# Patient Record
Sex: MALE | Race: WHITE | Employment: OTHER | URBAN - METROPOLITAN AREA
[De-identification: names, ages, dates, MRNs, and addresses within clinical notes are randomized per-mention and may not be internally consistent; named-entity substitution may affect disease eponyms.]

---

## 2022-01-28 ENCOUNTER — HOSPITAL ENCOUNTER (OUTPATIENT)
Age: 57
Setting detail: SPECIMEN
Discharge: HOME | End: 2022-01-28

## 2022-01-28 ENCOUNTER — OFFICE VISIT (OUTPATIENT)
Dept: INTERNAL MEDICINE CLINIC | Age: 57
End: 2022-01-28
Payer: COMMERCIAL

## 2022-01-28 VITALS
HEIGHT: 67 IN | HEART RATE: 94 BPM | WEIGHT: 146.6 LBS | OXYGEN SATURATION: 99 % | SYSTOLIC BLOOD PRESSURE: 120 MMHG | DIASTOLIC BLOOD PRESSURE: 80 MMHG | BODY MASS INDEX: 23.01 KG/M2

## 2022-01-28 DIAGNOSIS — F10.20 ALCOHOLISM (HCC): ICD-10-CM

## 2022-01-28 DIAGNOSIS — Z13.220 SCREENING FOR HYPERLIPIDEMIA: ICD-10-CM

## 2022-01-28 DIAGNOSIS — F33.41 RECURRENT MAJOR DEPRESSIVE DISORDER, IN PARTIAL REMISSION (HCC): ICD-10-CM

## 2022-01-28 DIAGNOSIS — F18.10 ABUSE OF SMOKED SUBSTANCE (HCC): ICD-10-CM

## 2022-01-28 DIAGNOSIS — J96.11 CHRONIC RESPIRATORY FAILURE WITH HYPOXIA (HCC): ICD-10-CM

## 2022-01-28 DIAGNOSIS — I10 BENIGN ESSENTIAL HTN: ICD-10-CM

## 2022-01-28 DIAGNOSIS — J96.11 CHRONIC RESPIRATORY FAILURE WITH HYPOXIA (HCC): Primary | ICD-10-CM

## 2022-01-28 DIAGNOSIS — Z12.11 SCREENING FOR COLON CANCER: ICD-10-CM

## 2022-01-28 DIAGNOSIS — J44.9 CHRONIC OBSTRUCTIVE PULMONARY DISEASE, UNSPECIFIED COPD TYPE (HCC): ICD-10-CM

## 2022-01-28 LAB
-: NORMAL
ABSOLUTE EOS #: 0.07 K/UL (ref 0–0.44)
ABSOLUTE IMMATURE GRANULOCYTE: 0.07 K/UL (ref 0–0.3)
ABSOLUTE LYMPH #: 1.64 K/UL (ref 1.1–3.7)
ABSOLUTE MONO #: 0.58 K/UL (ref 0.1–1.2)
ALBUMIN SERPL-MCNC: 4.3 G/DL (ref 3.5–5.2)
ALBUMIN/GLOBULIN RATIO: 1.6 (ref 1–2.5)
ALP BLD-CCNC: 87 U/L (ref 40–129)
ALT SERPL-CCNC: 11 U/L (ref 5–41)
AMORPHOUS: NORMAL
ANION GAP SERPL CALCULATED.3IONS-SCNC: 12 MMOL/L (ref 9–17)
AST SERPL-CCNC: 21 U/L
BACTERIA: NORMAL
BASOPHILS # BLD: 1 % (ref 0–2)
BASOPHILS ABSOLUTE: 0.05 K/UL (ref 0–0.2)
BILIRUB SERPL-MCNC: 0.25 MG/DL (ref 0.3–1.2)
BILIRUBIN URINE: ABNORMAL
BUN BLDV-MCNC: 11 MG/DL (ref 6–20)
BUN/CREAT BLD: ABNORMAL (ref 9–20)
CALCIUM SERPL-MCNC: 9.5 MG/DL (ref 8.6–10.4)
CASTS UA: NORMAL /LPF (ref 0–8)
CHLORIDE BLD-SCNC: 92 MMOL/L (ref 98–107)
CHOLESTEROL/HDL RATIO: 3.1
CHOLESTEROL: 183 MG/DL
CO2: 29 MMOL/L (ref 20–31)
COLOR: ABNORMAL
COMMENT UA: ABNORMAL
CREAT SERPL-MCNC: 0.75 MG/DL (ref 0.7–1.2)
CRYSTALS, UA: NORMAL /HPF
DIFFERENTIAL TYPE: ABNORMAL
EOSINOPHILS RELATIVE PERCENT: 1 % (ref 1–4)
EPITHELIAL CELLS UA: NORMAL /HPF (ref 0–5)
GFR AFRICAN AMERICAN: >60 ML/MIN
GFR NON-AFRICAN AMERICAN: >60 ML/MIN
GFR SERPL CREATININE-BSD FRML MDRD: ABNORMAL ML/MIN/{1.73_M2}
GFR SERPL CREATININE-BSD FRML MDRD: ABNORMAL ML/MIN/{1.73_M2}
GLUCOSE BLD-MCNC: 93 MG/DL (ref 70–99)
GLUCOSE URINE: NEGATIVE
HCT VFR BLD CALC: 44.2 % (ref 40.7–50.3)
HDLC SERPL-MCNC: 59 MG/DL
HEMOGLOBIN: 13.3 G/DL (ref 13–17)
IMMATURE GRANULOCYTES: 1 %
KETONES, URINE: ABNORMAL
LDL CHOLESTEROL: 107 MG/DL (ref 0–130)
LEUKOCYTE ESTERASE, URINE: ABNORMAL
LYMPHOCYTES # BLD: 23 % (ref 24–43)
MCH RBC QN AUTO: 29.8 PG (ref 25.2–33.5)
MCHC RBC AUTO-ENTMCNC: 30.1 G/DL (ref 28.4–34.8)
MCV RBC AUTO: 99.1 FL (ref 82.6–102.9)
MONOCYTES # BLD: 8 % (ref 3–12)
MUCUS: NORMAL
NITRITE, URINE: NEGATIVE
NRBC AUTOMATED: 0 PER 100 WBC
OTHER OBSERVATIONS UA: NORMAL
PDW BLD-RTO: 11.7 % (ref 11.8–14.4)
PH UA: 6 (ref 5–8)
PLATELET # BLD: 330 K/UL (ref 138–453)
PLATELET ESTIMATE: ABNORMAL
PMV BLD AUTO: 9.8 FL (ref 8.1–13.5)
POTASSIUM SERPL-SCNC: 5.7 MMOL/L (ref 3.7–5.3)
PROTEIN UA: ABNORMAL
RBC # BLD: 4.46 M/UL (ref 4.21–5.77)
RBC # BLD: ABNORMAL 10*6/UL
RBC UA: NORMAL /HPF (ref 0–4)
RENAL EPITHELIAL, UA: NORMAL /HPF
SEG NEUTROPHILS: 66 % (ref 36–65)
SEGMENTED NEUTROPHILS ABSOLUTE COUNT: 4.66 K/UL (ref 1.5–8.1)
SODIUM BLD-SCNC: 133 MMOL/L (ref 135–144)
SPECIFIC GRAVITY UA: 1.03 (ref 1–1.03)
TOTAL PROTEIN: 7 G/DL (ref 6.4–8.3)
TRICHOMONAS: NORMAL
TRIGL SERPL-MCNC: 86 MG/DL
TSH SERPL DL<=0.05 MIU/L-ACNC: 0.55 MIU/L (ref 0.3–5)
TURBIDITY: ABNORMAL
URINE HGB: NEGATIVE
UROBILINOGEN, URINE: NORMAL
VITAMIN D 25-HYDROXY: 13.4 NG/ML (ref 30–100)
VLDLC SERPL CALC-MCNC: NORMAL MG/DL (ref 1–30)
WBC # BLD: 7.1 K/UL (ref 3.5–11.3)
WBC # BLD: ABNORMAL 10*3/UL
WBC UA: NORMAL /HPF (ref 0–5)
YEAST: NORMAL

## 2022-01-28 PROCEDURE — 99204 OFFICE O/P NEW MOD 45 MIN: CPT | Performed by: INTERNAL MEDICINE

## 2022-01-28 PROCEDURE — 3023F SPIROM DOC REV: CPT | Performed by: INTERNAL MEDICINE

## 2022-01-28 PROCEDURE — G8427 DOCREV CUR MEDS BY ELIG CLIN: HCPCS | Performed by: INTERNAL MEDICINE

## 2022-01-28 PROCEDURE — 3017F COLORECTAL CA SCREEN DOC REV: CPT | Performed by: INTERNAL MEDICINE

## 2022-01-28 PROCEDURE — 4004F PT TOBACCO SCREEN RCVD TLK: CPT | Performed by: INTERNAL MEDICINE

## 2022-01-28 PROCEDURE — G8484 FLU IMMUNIZE NO ADMIN: HCPCS | Performed by: INTERNAL MEDICINE

## 2022-01-28 PROCEDURE — G8420 CALC BMI NORM PARAMETERS: HCPCS | Performed by: INTERNAL MEDICINE

## 2022-01-28 RX ORDER — LANOLIN ALCOHOL/MO/W.PET/CERES
CREAM (GRAM) TOPICAL
COMMUNITY
Start: 2021-12-29 | End: 2022-03-24 | Stop reason: SDUPTHER

## 2022-01-28 RX ORDER — ALBUTEROL SULFATE 2.5 MG/3ML
SOLUTION RESPIRATORY (INHALATION)
COMMUNITY
Start: 2021-11-19 | End: 2022-06-07 | Stop reason: SDUPTHER

## 2022-01-28 RX ORDER — ESCITALOPRAM OXALATE 10 MG/1
TABLET ORAL
COMMUNITY
Start: 2021-12-24 | End: 2022-03-24 | Stop reason: SDUPTHER

## 2022-01-28 RX ORDER — BUDESONIDE AND FORMOTEROL FUMARATE DIHYDRATE 160; 4.5 UG/1; UG/1
AEROSOL RESPIRATORY (INHALATION)
COMMUNITY
Start: 2022-01-25 | End: 2022-03-24 | Stop reason: SDUPTHER

## 2022-01-28 RX ORDER — FOLIC ACID 1 MG/1
TABLET ORAL
COMMUNITY
Start: 2021-12-02 | End: 2022-03-24 | Stop reason: SDUPTHER

## 2022-01-28 RX ORDER — TIOTROPIUM BROMIDE 18 UG/1
18 CAPSULE ORAL; RESPIRATORY (INHALATION) DAILY
COMMUNITY
End: 2022-03-24 | Stop reason: SDUPTHER

## 2022-01-28 RX ORDER — TRAZODONE HYDROCHLORIDE 50 MG/1
TABLET ORAL
COMMUNITY
Start: 2021-12-02 | End: 2022-03-24 | Stop reason: SDUPTHER

## 2022-01-28 RX ORDER — LISINOPRIL 10 MG/1
TABLET ORAL
COMMUNITY
Start: 2021-11-27 | End: 2022-02-11

## 2022-01-28 SDOH — ECONOMIC STABILITY: FOOD INSECURITY: WITHIN THE PAST 12 MONTHS, THE FOOD YOU BOUGHT JUST DIDN'T LAST AND YOU DIDN'T HAVE MONEY TO GET MORE.: NEVER TRUE

## 2022-01-28 SDOH — ECONOMIC STABILITY: FOOD INSECURITY: WITHIN THE PAST 12 MONTHS, YOU WORRIED THAT YOUR FOOD WOULD RUN OUT BEFORE YOU GOT MONEY TO BUY MORE.: NEVER TRUE

## 2022-01-28 ASSESSMENT — PATIENT HEALTH QUESTIONNAIRE - PHQ9
SUM OF ALL RESPONSES TO PHQ QUESTIONS 1-9: 2
SUM OF ALL RESPONSES TO PHQ QUESTIONS 1-9: 2
1. LITTLE INTEREST OR PLEASURE IN DOING THINGS: 1
SUM OF ALL RESPONSES TO PHQ9 QUESTIONS 1 & 2: 2
SUM OF ALL RESPONSES TO PHQ QUESTIONS 1-9: 2
SUM OF ALL RESPONSES TO PHQ QUESTIONS 1-9: 2
2. FEELING DOWN, DEPRESSED OR HOPELESS: 1

## 2022-01-28 ASSESSMENT — SOCIAL DETERMINANTS OF HEALTH (SDOH): HOW HARD IS IT FOR YOU TO PAY FOR THE VERY BASICS LIKE FOOD, HOUSING, MEDICAL CARE, AND HEATING?: NOT HARD AT ALL

## 2022-01-28 NOTE — PROGRESS NOTES
Visit Information    Have you changed or started any medications since your last visit including any over-the-counter medicines, vitamins, or herbal medicines? no   Are you having any side effects from any of your medications? -  no  Have you stopped taking any of your medications? Is so, why? -  no    Have you seen any other physician or provider since your last visit? No  Have you had any other diagnostic tests since your last visit? No  Have you been seen in the emergency room and/or had an admission to a hospital since we last saw you? No  Have you had your routine dental cleaning in the past 6 months? no    Have you activated your [employer] account? If not, what are your barriers?  No:      Patient Care Team:  Aliyah Ragsdale MD as PCP - General (Internal Medicine)    Medical History Review  Past Medical, Family, and Social History reviewed and does contribute to the patient presenting condition    Health Maintenance   Topic Date Due    Hepatitis C screen  Never done    COVID-19 Vaccine (1) Never done    Depression Screen  Never done    HIV screen  Never done    DTaP/Tdap/Td vaccine (1 - Tdap) Never done    Lipid screen  Never done    Colon cancer screen colonoscopy  Never done    Shingles Vaccine (1 of 2) Never done    Flu vaccine (1) Never done    Hepatitis A vaccine  Aged Out    Hepatitis B vaccine  Aged Out    Hib vaccine  Aged Out    Meningococcal (ACWY) vaccine  Aged Out    Pneumococcal 0-64 years Vaccine  Aged Out

## 2022-01-29 DIAGNOSIS — E87.5 HYPERKALEMIA: Primary | ICD-10-CM

## 2022-01-29 RX ORDER — ERGOCALCIFEROL 1.25 MG/1
50000 CAPSULE ORAL WEEKLY
Qty: 12 CAPSULE | Refills: 1 | Status: SHIPPED | OUTPATIENT
Start: 2022-01-29 | End: 2022-06-22 | Stop reason: SDUPTHER

## 2022-01-29 RX ORDER — SODIUM POLYSTYRENE SULFONATE 15 G/60ML
15 SUSPENSION ORAL; RECTAL 2 TIMES DAILY
Qty: 240 ML | Refills: 0 | Status: SHIPPED | OUTPATIENT
Start: 2022-01-29 | End: 2022-06-22

## 2022-01-29 NOTE — ASSESSMENT & PLAN NOTE
HTN:    Discussed in detail about the BP, lab results, importance of diet, salt intake, exercise, and med compliance. Medication side effects discussed in detail and has none today. Patient verbalized understanding.      BP Readings from Last 3 Encounters:   01/28/22 120/80        CREATININE   Date Value Ref Range Status   01/28/2022 0.75 0.70 - 1.20 mg/dL Final

## 2022-01-29 NOTE — ASSESSMENT & PLAN NOTE
Longstanding history of smoking, more than 50-pack-year,  Advanced COPD, on home oxygen, 3 L,  Still smokes pack a day, understand that he can burn his face with oxygen on, he takes oxygen off when he smokes, strongly advised to quit smoking

## 2022-01-29 NOTE — PROGRESS NOTES
[address] [facility] [address]  [place] [address]  Dept: [phone]  Dept Fax: [phone]     Name: Dulce Yousif  : 1965           Chief Complaint   Patient presents with    New Patient    COPD     needs a new pulmonologist just moved to the area, referral for patient preference is pending     Hypertension     mamanged with lisinopril       History of Present Illness:    HPI  Pt Here to est care   Smoking pack a day with hx of >50 pack yr   Quit drinking alcohol few weeks back   Have been alcoholic all his life   On ch oxygen   Copd   Past Medical History:    No past medical history on file. Reviewed all health maintenance requirements and ordered appropriate tests  Health Maintenance Due   Topic Date Due    Hepatitis C screen  Never done    COVID-19 Vaccine (1) Never done    Depression Screen  Never done    HIV screen  Never done    DTaP/Tdap/Td vaccine (1 - Tdap) Never done    Colon cancer screen colonoscopy  Never done    Shingles Vaccine (1 of 2) Never done    Low dose CT lung screening  Never done    Flu vaccine (1) Never done       Past Surgical History:    No past surgical history on file. Medications:      Current Outpatient Medications:     tiotropium (SPIRIVA HANDIHALER) 18 MCG inhalation capsule, Inhale 18 mcg into the lungs daily, Disp: , Rfl:     traZODone (DESYREL) 50 MG tablet, , Disp: , Rfl:     thiamine 100 MG tablet, , Disp: , Rfl:     lisinopril (PRINIVIL;ZESTRIL) 10 MG tablet, , Disp: , Rfl:     folic acid (FOLVITE) 1 MG tablet, , Disp: , Rfl:     escitalopram (LEXAPRO) 10 MG tablet, , Disp: , Rfl:     SYMBICORT 160-4.5 MCG/ACT AERO, , Disp: , Rfl:     albuterol (PROVENTIL) (2.5 MG/3ML) 0.083% nebulizer solution, , Disp: , Rfl:     Allergies:      Patient has no known allergies. Social History:    Tobacco:    reports that he has been smoking. He has a 40.00 pack-year smoking history.  He has never used smokeless tobacco.  Alcohol:      reports previous alcohol use. Drug Use:  reports previous drug use. Family History:    No family history on file.     Review of Systems:    Positive and Negative as described in HPI    Constitutional:  negative for  fevers, chills, sweats, fatigue, and weight loss  HEENT:  negative for vision or hearing changes,   Respiratory: positive for shortness of breath, cough, or congestion  Cardiovascular:  negative for  chest pain, palpitations  Gastrointestinal:  negative for nausea, vomiting, diarrhea, constipation, abdominal pain  Genitourinary:  negative for frequency, dysuria  Integument/Breast:  negative for rash, skin lesions  Musculoskeletal:  negative for muscle aches or joint pain  Neurological:  negative for headaches, dizziness, lightheadedness, numbness, pain and tingling extrimities  Behavior/Psych:  negative for depression and anxiety      Physical Exam:    Vitals:  /80   Pulse 94   Ht 5' 7\" (1.702 m)   Wt 146 lb 9.6 oz (66.5 kg)   SpO2 99%   BMI 22.96 kg/m²     General appearance - alert, well appearing, and in no acute distress  Mental status - oriented to person, place, and time with normal affect  Head - normocephalic and atraumatic  Eyes - pupils equal and reactive, extraocular eye movements intact, conjunctiva clear  Ears - hearing appears to be intact  Nose - no drainage noted  Mouth - mucous membranes moist  Neck - supple, no carotid bruits, thyroid not palpable  Chest - b/l wheezing, prolonged ex phase   Heart - normal rate, regular rhythm, no murmurs  Abdomen - soft, nontender, nondistended, bowel sounds present all four quadrants, no masses, hepatomegaly or splenomegaly  Neurological - normal speech, no focal findings or movement disorder noted, cranial nerves II through XII grossly intact  Extremities - peripheral pulses palpable, no pedal edema or calf pain with palpation  Skin - no gross lesions, rashes, or induration noted      Data:    Lab Results Component Value Date     01/28/2022    K 5.7 01/28/2022    CL 92 01/28/2022    CO2 29 01/28/2022    BUN 11 01/28/2022    CREATININE 0.75 01/28/2022    GLUCOSE 93 01/28/2022    PROT 7.0 01/28/2022    LABALBU 4.3 01/28/2022    BILITOT 0.25 01/28/2022    ALKPHOS 87 01/28/2022    AST 21 01/28/2022    ALT 11 01/28/2022     Lab Results   Component Value Date    WBC 7.1 01/28/2022    RBC 4.46 01/28/2022    HGB 13.3 01/28/2022    HCT 44.2 01/28/2022    MCV 99.1 01/28/2022    MCH 29.8 01/28/2022    MCHC 30.1 01/28/2022    RDW 11.7 01/28/2022     01/28/2022    MPV 9.8 01/28/2022     Lab Results   Component Value Date    TSH 0.55 01/28/2022     Lab Results   Component Value Date    CHOL 183 01/28/2022    HDL 59 01/28/2022          Assessment & Plan:     Diagnosis Orders   1. [facility]  FIT-DNA (Cologuard)    BIANCA Brewster MD, Pulmonology, [place]    CBC Auto Differential    Comprehensive Metabolic Panel    Lipid Panel    Hemoglobin A1C    TSH without Reflex    Vitamin D 25 Hydroxy    Urinalysis   2. Alcoholism ([facility]  FIT-DNA (Cologuard)    Sang Villagran MD, Pulmonology, [place]    CBC Auto Differential    Comprehensive Metabolic Panel    Lipid Panel    Hemoglobin A1C    TSH without Reflex    Vitamin D 25 Hydroxy    Urinalysis   3. Screening for colon cancer  FIT-DNA (Cologuard)   4. Screening for hyperlipidemia     5. Abuse of smoked substance ([facility]     6. Chronic obstructive pulmonary disease, unspecified COPD type ([facility]     7. Benign essential HTN     8. Recurrent major depressive disorder, in partial remission ([facility]         1.  Chronic respiratory failure with hypoxia (HCC)  Assessment & Plan:   Longstanding history of smoking, more than 50-pack-year,  Advanced COPD, on home oxygen, 3 L,  Still smokes pack a day, understand that he can burn his face with oxygen on, he takes oxygen off when he smokes, strongly advised to quit smoking  Orders:  -     FIT-DNA (Cologuard); Future  -     BIANCA Iqbal MD, Pulmonology, [place]  -     CBC Auto Differential; Future  -     Comprehensive Metabolic Panel; Future  -     Lipid Panel; Future  -     Hemoglobin A1C; Future  -     TSH without Reflex; Future  -     Vitamin D 25 Hydroxy; Future  -     Urinalysis; Future  2. Alcoholism [facility])  Assessment & Plan:  Patient has been drinking alcohol all his life, quit drinking few weeks back, not in any withdrawal at this time,  We will get the liver functions checked  Orders:  -     [facility]  -     BIANCA - Guillermina Iqbal MD, Pulmonology, [place]  -     CBC Auto Differential; Future  -     Comprehensive Metabolic Panel; Future  -     Lipid Panel; Future  -     Hemoglobin A1C; Future  -     TSH without Reflex; Future  -     Vitamin D 25 Hydroxy; Future  -     Urinalysis; Future  3. Screening for colon cancer  -     FIT-DNA (Cologuard); Future  4. Screening for hyperlipidemia  5. Abuse of smoked substance ([facility]  Assessment & Plan:   Smoking   Pt still smokes >10 pack yr   Strongly advised to quit,  pt will try /help offered  Counseling done for >10 mins     6. Chronic obstructive pulmonary disease, unspecified COPD type ([facility]  Assessment & Plan:  More than 50-pack-year history of smoking, advanced COPD and chronic respiratory failure, continue inhalers, refer to pulmonary  7. Benign essential HTN  Assessment & Plan:  HTN:    Discussed in detail about the BP, lab results, importance of diet, salt intake, exercise, and med compliance. Medication side effects discussed in detail and has none today. Patient verbalized understanding. BP Readings from Last 3 Encounters:   01/28/22 120/80        CREATININE   Date Value Ref Range Status   01/28/2022 0.75 0.70 - 1.20 mg/dL Final            8.  Recurrent major depressive disorder, in partial remission ([facility]  Assessment & Plan:  Seems like stable with meds   Counseling offered                Completed Refills   Requested Prescriptions      No prescriptions requested or ordered in this encounter     Return in about 4 weeks (around 2/25/2022). No orders of the defined types were placed in this encounter. Orders Placed This Encounter   Procedures    FIT-DNA (Cologuard)     Standing Status:   Future     Standing Expiration Date:   1/26/2023    CBC Auto Differential     Standing Status:   Future     Number of Occurrences:   1     Standing Expiration Date:   1/28/2023    Comprehensive Metabolic Panel     Standing Status:   Future     Number of Occurrences:   1     Standing Expiration Date:   1/28/2023    Lipid Panel     Standing Status:   Future     Number of Occurrences:   1     Standing Expiration Date:   1/28/2023     Order Specific Question:   Is Patient Fasting?/# of Hours     Answer:   8-10 Hours    Hemoglobin A1C     Standing Status:   Future     Number of Occurrences:   1     Standing Expiration Date:   1/28/2023    TSH without Reflex     Standing Status:   Future     Number of Occurrences:   1     Standing Expiration Date:   1/28/2023    Vitamin D 25 Hydroxy     Standing Status:   Future     Number of Occurrences:   1     Standing Expiration Date:   1/28/2023    Urinalysis     Standing Status:   Future     Number of Occurrences:   1     Standing Expiration Date:   1/28/2023     Order Specific Question:   SPECIFY(EX-CATH,MIDSTREAM,CYSTO,ETC)?      Answer:   Portia De La Garza MD, Pulmonology, [place]     Referral Priority:   Routine     Referral Type:   Eval and Treat     Referral Reason:   Specialty Services Required     Referred to Provider:   Jt Schwarz MD     Requested Specialty:   Pulmonology     Number of Visits Requested:   1       Electronically signed by Emile Lugo MD on 1/28/2022 at 10:52 PM

## 2022-01-29 NOTE — ASSESSMENT & PLAN NOTE
Patient has been drinking alcohol all his life, quit drinking few weeks back, not in any withdrawal at this time,  We will get the liver functions checked

## 2022-01-29 NOTE — ASSESSMENT & PLAN NOTE
More than 50-pack-year history of smoking, advanced COPD and chronic respiratory failure, continue inhalers, refer to pulmonary

## 2022-01-29 NOTE — ASSESSMENT & PLAN NOTE
Smoking   Pt still smokes >10 pack yr   Strongly advised to quit,  pt will try /help offered  Counseling done for >10 mins

## 2022-01-30 LAB
ESTIMATED AVERAGE GLUCOSE: 97 MG/DL
HBA1C MFR BLD: 5 % (ref 4–6)

## 2022-02-01 ENCOUNTER — TELEPHONE (OUTPATIENT)
Dept: INTERNAL MEDICINE CLINIC | Age: 57
End: 2022-02-01

## 2022-02-01 NOTE — TELEPHONE ENCOUNTER
Ashly Salcedo called to clarify lab result notes , repeated Dr Mela Parrish message back to her. Question how long do you want patient to wait to repeat recheck on potassium and how long does he hold the lisinopril?

## 2022-02-02 NOTE — TELEPHONE ENCOUNTER
I ordered it for a specific date , dont remember , Will start lisinopril right after K results are back

## 2022-02-10 ENCOUNTER — HOSPITAL ENCOUNTER (OUTPATIENT)
Age: 57
Setting detail: SPECIMEN
Discharge: HOME | End: 2022-02-10

## 2022-02-10 DIAGNOSIS — E87.5 HYPERKALEMIA: ICD-10-CM

## 2022-02-10 LAB
ANION GAP SERPL CALCULATED.3IONS-SCNC: 15 MMOL/L (ref 9–17)
BUN BLDV-MCNC: 9 MG/DL (ref 6–20)
BUN/CREAT BLD: ABNORMAL (ref 9–20)
CALCIUM SERPL-MCNC: 9.5 MG/DL (ref 8.6–10.4)
CHLORIDE BLD-SCNC: 86 MMOL/L (ref 98–107)
CO2: 28 MMOL/L (ref 20–31)
CREAT SERPL-MCNC: 0.6 MG/DL (ref 0.7–1.2)
GFR AFRICAN AMERICAN: >60 ML/MIN
GFR NON-AFRICAN AMERICAN: >60 ML/MIN
GFR SERPL CREATININE-BSD FRML MDRD: ABNORMAL ML/MIN/{1.73_M2}
GFR SERPL CREATININE-BSD FRML MDRD: ABNORMAL ML/MIN/{1.73_M2}
GLUCOSE BLD-MCNC: 85 MG/DL (ref 70–99)
POTASSIUM SERPL-SCNC: 5.3 MMOL/L (ref 3.7–5.3)
SODIUM BLD-SCNC: 129 MMOL/L (ref 135–144)

## 2022-02-11 ENCOUNTER — OFFICE VISIT (OUTPATIENT)
Dept: INTERNAL MEDICINE CLINIC | Age: 57
End: 2022-02-11
Payer: COMMERCIAL

## 2022-02-11 VITALS
WEIGHT: 147.2 LBS | BODY MASS INDEX: 23.1 KG/M2 | DIASTOLIC BLOOD PRESSURE: 100 MMHG | HEIGHT: 67 IN | SYSTOLIC BLOOD PRESSURE: 162 MMHG | HEART RATE: 87 BPM | OXYGEN SATURATION: 99 %

## 2022-02-11 DIAGNOSIS — F18.10 ABUSE OF SMOKED SUBSTANCE (HCC): ICD-10-CM

## 2022-02-11 DIAGNOSIS — I10 BENIGN ESSENTIAL HTN: ICD-10-CM

## 2022-02-11 DIAGNOSIS — E87.1 HYPONATREMIA: ICD-10-CM

## 2022-02-11 DIAGNOSIS — F10.20 ALCOHOLISM (HCC): ICD-10-CM

## 2022-02-11 DIAGNOSIS — F33.41 RECURRENT MAJOR DEPRESSIVE DISORDER, IN PARTIAL REMISSION (HCC): ICD-10-CM

## 2022-02-11 DIAGNOSIS — E87.5 HYPERKALEMIA: ICD-10-CM

## 2022-02-11 DIAGNOSIS — J96.11 CHRONIC RESPIRATORY FAILURE WITH HYPOXIA (HCC): Primary | ICD-10-CM

## 2022-02-11 DIAGNOSIS — E55.9 VITAMIN D DEFICIENCY: ICD-10-CM

## 2022-02-11 PROCEDURE — G8420 CALC BMI NORM PARAMETERS: HCPCS | Performed by: INTERNAL MEDICINE

## 2022-02-11 PROCEDURE — 3017F COLORECTAL CA SCREEN DOC REV: CPT | Performed by: INTERNAL MEDICINE

## 2022-02-11 PROCEDURE — G8484 FLU IMMUNIZE NO ADMIN: HCPCS | Performed by: INTERNAL MEDICINE

## 2022-02-11 PROCEDURE — 99214 OFFICE O/P EST MOD 30 MIN: CPT | Performed by: INTERNAL MEDICINE

## 2022-02-11 PROCEDURE — G8427 DOCREV CUR MEDS BY ELIG CLIN: HCPCS | Performed by: INTERNAL MEDICINE

## 2022-02-11 PROCEDURE — 4004F PT TOBACCO SCREEN RCVD TLK: CPT | Performed by: INTERNAL MEDICINE

## 2022-02-11 RX ORDER — AMLODIPINE BESYLATE 10 MG/1
10 TABLET ORAL DAILY
Qty: 30 TABLET | Refills: 5 | Status: SHIPPED | OUTPATIENT
Start: 2022-02-11 | End: 2022-03-25 | Stop reason: SDUPTHER

## 2022-02-11 RX ORDER — LISINOPRIL AND HYDROCHLOROTHIAZIDE 12.5; 1 MG/1; MG/1
1 TABLET ORAL DAILY
Qty: 30 TABLET | Refills: 11 | Status: SHIPPED | OUTPATIENT
Start: 2022-02-11 | End: 2022-02-11

## 2022-02-11 RX ORDER — LIDOCAINE 4 G/G
1 PATCH TOPICAL DAILY
Qty: 30 PATCH | Refills: 0 | Status: SHIPPED | OUTPATIENT
Start: 2022-02-11 | End: 2022-03-13

## 2022-02-11 NOTE — PROGRESS NOTES
[address] [facility] [address]  [place] [address]  Dept: [phone]  Dept Fax: [phone]     Name: Adrianna Reaves  : 1965           Chief Complaint   Patient presents with    COPD     2 week follow up review labs    Hypertension     2 weeks follow up review labs       History of Present Illness:    HPI  Here to follow up   Still smoke   On oxygen   BP is running high     Past Medical History:    No past medical history on file. Reviewed all health maintenance requirements and ordered appropriate tests  Health Maintenance Due   Topic Date Due    Hepatitis C screen  Never done    COVID-19 Vaccine (1) Never done    Pneumococcal 0-64 years Vaccine (1 of 2 - PPSV23) Never done    HIV screen  Never done    DTaP/Tdap/Td vaccine (1 - Tdap) Never done    Colon cancer screen colonoscopy  Never done    Shingles Vaccine (1 of 2) Never done    Low dose CT lung screening  Never done    Flu vaccine (1) Never done       Past Surgical History:    No past surgical history on file.      Medications:      Current Outpatient Medications:     lidocaine 4 % external patch, Place 1 patch onto the skin daily, Disp: 30 patch, Rfl: 0    amLODIPine (NORVASC) 10 MG tablet, Take 1 tablet by mouth daily, Disp: 30 tablet, Rfl: 5    vitamin D (ERGOCALCIFEROL) 1.25 MG (44719 UT) CAPS capsule, Take 1 capsule by mouth once a week, Disp: 12 capsule, Rfl: 1    tiotropium (SPIRIVA HANDIHALER) 18 MCG inhalation capsule, Inhale 18 mcg into the lungs daily, Disp: , Rfl:     traZODone (DESYREL) 50 MG tablet, , Disp: , Rfl:     thiamine 100 MG tablet, , Disp: , Rfl:     folic acid (FOLVITE) 1 MG tablet, , Disp: , Rfl:     escitalopram (LEXAPRO) 10 MG tablet, , Disp: , Rfl:     SYMBICORT 160-4.5 MCG/ACT AERO, , Disp: , Rfl:     albuterol (PROVENTIL) (2.5 MG/3ML) 0.083% nebulizer solution, , Disp: , Rfl:     sodium polystyrene (SPS) 15 GM/60ML suspension, Take 60 mLs by mouth 2 times daily for 4 doses, Disp: 240 mL, Rfl: 0    Allergies:      Patient has no known allergies. Social History:    Tobacco:    reports that he has been smoking. He has a 40.00 pack-year smoking history. He has never used smokeless tobacco.  Alcohol:      reports previous alcohol use. Drug Use:  reports previous drug use. Family History:    No family history on file.     Review of Systems:    Positive and Negative as described in HPI    Constitutional:  negative for  fevers, chills, sweats, fatigue, and weight loss  HEENT:  negative for vision or hearing changes,   Respiratory:  negative for shortness of breath, cough, or congestion  Cardiovascular:  negative for  chest pain, palpitations  Gastrointestinal:  negative for nausea, vomiting, diarrhea, constipation, abdominal pain  Genitourinary:  negative for frequency, dysuria  Integument/Breast:  negative for rash, skin lesions  Musculoskeletal:  negative for muscle aches or joint pain  Neurological:  negative for headaches, dizziness, lightheadedness, numbness, pain and tingling extrimities  Behavior/Psych:  negative for depression and anxiety      Physical Exam:    Vitals:  BP (!) 162/100 (Site: Right Upper Arm)   Pulse 87   Ht 5' 7\" (1.702 m)   Wt 147 lb 3.2 oz (66.8 kg)   SpO2 99%   BMI 23.05 kg/m²     General appearance - alert, well appearing, and in no acute distress  Mental status - oriented to person, place, and time with normal affect  Head - normocephalic and atraumatic  Eyes - pupils equal and reactive, extraocular eye movements intact, conjunctiva clear  Ears - hearing appears to be intact  Nose - no drainage noted  Mouth - mucous membranes moist  Neck - supple, no carotid bruits, thyroid not palpable  Chest - clear to auscultation, normal effort  Heart - normal rate, regular rhythm, no murmurs  Abdomen - soft, nontender, nondistended, bowel sounds present all four quadrants, no masses, hepatomegaly or splenomegaly  Neurological - normal speech, no focal findings or movement disorder noted, cranial nerves II through XII grossly intact  Extremities - peripheral pulses palpable, no pedal edema or calf pain with palpation  Skin - no gross lesions, rashes, or induration noted      Data:    Lab Results   Component Value Date     02/10/2022    K 5.3 02/10/2022    CL 86 02/10/2022    CO2 28 02/10/2022    BUN 9 02/10/2022    CREATININE 0.60 02/10/2022    GLUCOSE 85 02/10/2022    PROT 7.0 01/28/2022    LABALBU 4.3 01/28/2022    BILITOT 0.25 01/28/2022    ALKPHOS 87 01/28/2022    AST 21 01/28/2022    ALT 11 01/28/2022     Lab Results   Component Value Date    WBC 7.1 01/28/2022    RBC 4.46 01/28/2022    HGB 13.3 01/28/2022    HCT 44.2 01/28/2022    MCV 99.1 01/28/2022    MCH 29.8 01/28/2022    MCHC 30.1 01/28/2022    RDW 11.7 01/28/2022     01/28/2022    MPV 9.8 01/28/2022     Lab Results   Component Value Date    TSH 0.55 01/28/2022     Lab Results   Component Value Date    CHOL 183 01/28/2022    HDL 59 01/28/2022    LABA1C 5.0 01/28/2022          Assessment & Plan:     Diagnosis Orders   1. Chronic respiratory failure with hypoxia (HCC)     2. Abuse of smoked substance ([facility]     3. Recurrent major depressive disorder, in partial remission ([facility]     4. Benign essential HTN     5. Alcoholism ([facility]     6. Hyponatremia     7. Hyperkalemia     8. Vitamin D deficiency         1. Chronic respiratory failure with hypoxia (HCC)  2. Abuse of smoked substance ([facility]  3. Recurrent major depressive disorder, in partial remission ([facility]  4. Benign essential HTN  5. Alcoholism ([facility]  6. Hyponatremia  7. Hyperkalemia  8.  Vitamin D deficiency     Chronic respiratory failure on oxygen at this time, still smokes,  Continue inhalers,    Hypokalemia,  Patient was given a dose of Kayexalate,  Potassium 5.3 now,  We'll avoid lisinopril,  Sodium is 129,  Strictly advised to avoid any replacements, avoid bananas,    Hyponatremia, 129,  We'll not give him any diuretics, has underlying history of strong alcoholism not drinking anymore,    Alcoholism, quit drinking a few months back,    Hypertension, blood pressure running high at this time, will add amlodipine,  Discontinue the lisinopril and lisinopril hydrochlorothiazide,    Labs reviewed,    Vitamin D deficiency,  Replaced,  We'll see the patient in few months          Completed Refills   Requested Prescriptions     Signed Prescriptions Disp Refills    lidocaine 4 % external patch 30 patch 0     Sig: Place 1 patch onto the skin daily    amLODIPine (NORVASC) 10 MG tablet 30 tablet 5     Sig: Take 1 tablet by mouth daily     Return in about 4 weeks (around 3/11/2022). Orders Placed This Encounter   Medications    DISCONTD: lisinopril-hydroCHLOROthiazide (PRINZIDE;ZESTORETIC) 10-12.5 MG per tablet     Sig: Take 1 tablet by mouth daily     Dispense:  30 tablet     Refill:  11    lidocaine 4 % external patch     Sig: Place 1 patch onto the skin daily     Dispense:  30 patch     Refill:  0    DISCONTD: lisinopril-hydroCHLOROthiazide (PRINZIDE;ZESTORETIC) 10-12.5 MG per tablet     Sig: Take 1 tablet by mouth daily     Dispense:  30 tablet     Refill:  11    amLODIPine (NORVASC) 10 MG tablet     Sig: Take 1 tablet by mouth daily     Dispense:  30 tablet     Refill:  5     plz cancel lisinopril /hctz for this patient     No orders of the defined types were placed in this encounter.       Electronically signed by Nehemiah Stephenson MD on 2/11/2022 at 3:32 PM

## 2022-02-11 NOTE — PROGRESS NOTES
Visit Information    Have you changed or started any medications since your last visit including any over-the-counter medicines, vitamins, or herbal medicines? no   Are you having any side effects from any of your medications? -  no  Have you stopped taking any of your medications? Is so, why? -  no    Have you seen any other physician or provider since your last visit? No  Have you had any other diagnostic tests since your last visit? No  Have you been seen in the emergency room and/or had an admission to a hospital since we last saw you? No  Have you had your routine dental cleaning in the past 6 months? no    Have you activated your [employer] account? If not, what are your barriers?  No:      Patient Care Team:  Judy Olguin MD as PCP - General (Internal Medicine)  Judy Olguin MD as PCP - [facility]    Medical History Review  Past Medical, Family, and Social History reviewed and does contribute to the patient presenting condition    Health Maintenance   Topic Date Due    Hepatitis C screen  Never done    COVID-19 Vaccine (1) Never done    Pneumococcal 0-64 years Vaccine (1 of 2 - PPSV23) Never done    HIV screen  Never done    DTaP/Tdap/Td vaccine (1 - Tdap) Never done    Colon cancer screen colonoscopy  Never done    Shingles Vaccine (1 of 2) Never done    Low dose CT lung screening  Never done    Flu vaccine (1) Never done    Depression Monitoring  01/28/2023    Potassium monitoring  02/10/2023    Creatinine monitoring  02/10/2023    Lipid screen  01/28/2027    Hepatitis A vaccine  Aged Out    Hepatitis B vaccine  Aged Out    Hib vaccine  Aged Out    Meningococcal (ACWY) vaccine  Aged Out

## 2022-02-27 PROBLEM — Z13.220 SCREENING FOR HYPERLIPIDEMIA: Status: RESOLVED | Noted: 2022-01-28 | Resolved: 2022-02-27

## 2022-02-27 PROBLEM — Z12.11 SCREENING FOR COLON CANCER: Status: RESOLVED | Noted: 2022-01-28 | Resolved: 2022-02-27

## 2022-03-09 ENCOUNTER — OFFICE VISIT (OUTPATIENT)
Dept: INTERNAL MEDICINE CLINIC | Age: 57
End: 2022-03-09
Payer: COMMERCIAL

## 2022-03-09 VITALS
TEMPERATURE: 97.9 F | HEIGHT: 67 IN | SYSTOLIC BLOOD PRESSURE: 122 MMHG | OXYGEN SATURATION: 94 % | WEIGHT: 146 LBS | BODY MASS INDEX: 22.91 KG/M2 | HEART RATE: 86 BPM | DIASTOLIC BLOOD PRESSURE: 78 MMHG

## 2022-03-09 DIAGNOSIS — J96.11 CHRONIC RESPIRATORY FAILURE WITH HYPOXIA (HCC): Primary | ICD-10-CM

## 2022-03-09 DIAGNOSIS — Z12.11 SCREENING FOR COLON CANCER: ICD-10-CM

## 2022-03-09 DIAGNOSIS — F33.41 RECURRENT MAJOR DEPRESSIVE DISORDER, IN PARTIAL REMISSION (HCC): ICD-10-CM

## 2022-03-09 DIAGNOSIS — F10.20 ALCOHOLISM (HCC): ICD-10-CM

## 2022-03-09 PROCEDURE — 99213 OFFICE O/P EST LOW 20 MIN: CPT | Performed by: INTERNAL MEDICINE

## 2022-03-09 PROCEDURE — 3017F COLORECTAL CA SCREEN DOC REV: CPT | Performed by: INTERNAL MEDICINE

## 2022-03-09 PROCEDURE — G8484 FLU IMMUNIZE NO ADMIN: HCPCS | Performed by: INTERNAL MEDICINE

## 2022-03-09 PROCEDURE — G8420 CALC BMI NORM PARAMETERS: HCPCS | Performed by: INTERNAL MEDICINE

## 2022-03-09 PROCEDURE — G8427 DOCREV CUR MEDS BY ELIG CLIN: HCPCS | Performed by: INTERNAL MEDICINE

## 2022-03-09 PROCEDURE — 4004F PT TOBACCO SCREEN RCVD TLK: CPT | Performed by: INTERNAL MEDICINE

## 2022-03-09 ASSESSMENT — PATIENT HEALTH QUESTIONNAIRE - PHQ9
SUM OF ALL RESPONSES TO PHQ QUESTIONS 1-9: 0
SUM OF ALL RESPONSES TO PHQ QUESTIONS 1-9: 0
1. LITTLE INTEREST OR PLEASURE IN DOING THINGS: 0
SUM OF ALL RESPONSES TO PHQ QUESTIONS 1-9: 0
SUM OF ALL RESPONSES TO PHQ9 QUESTIONS 1 & 2: 0
SUM OF ALL RESPONSES TO PHQ QUESTIONS 1-9: 0
2. FEELING DOWN, DEPRESSED OR HOPELESS: 0

## 2022-03-09 NOTE — PROGRESS NOTES
5/4/18 V11 No Visit Information    Have you changed or started any medications since your last visit including any over-the-counter medicines, vitamins, or herbal medicines? no   Have you stopped taking any of your medications? Is so, why? -  no  Are you having any side effects from any of your medications? - no    Have you seen any other physician or provider since your last visit?  no   Have you had any other diagnostic tests since your last visit?  no   Have you been seen in the emergency room and/or had an admission in a hospital since we last saw you?  no   Have you had your routine dental cleaning in the past 6 months?  no     Do you have an active MyChart account? If no, what is the barrier?   No:     Patient Care Team:  Shmuel Rodriguez MD as PCP - General (Internal Medicine)  Shmuel Rodriguez MD as PCP - [facility]    Medical History Review  Past Medical, Family, and Social History reviewed and does contribute to the patient presenting condition    Health Maintenance   Topic Date Due    Hepatitis C screen  Never done    COVID-19 Vaccine (1) Never done    Pneumococcal 0-64 years Vaccine (1 of 2 - PPSV23) Never done    HIV screen  Never done    DTaP/Tdap/Td vaccine (1 - Tdap) Never done    Colorectal Cancer Screen  Never done    Shingles Vaccine (1 of 2) Never done    Low dose CT lung screening  Never done    Flu vaccine (1) Never done    Depression Monitoring  01/28/2023    Potassium monitoring  02/10/2023    Creatinine monitoring  02/10/2023    Lipid screen  01/28/2027    Hepatitis A vaccine  Aged Out    Hepatitis B vaccine  Aged Out    Hib vaccine  Aged Out    Meningococcal (ACWY) vaccine  Aged Out 18

## 2022-03-12 NOTE — PROGRESS NOTES
[address] [facility] [address]  [place] [address]  Dept: [phone]  Dept Fax: [phone]     Name: Gideon Moore  : 1965           Chief Complaint   Patient presents with    Hypertension     BP Check, BP controlled today    COPD     3Liters O2    Other     patient is currently using brother's oxygen machine and inquires about getting his own, sees Pulmanology in April, questions about having Colonoscopy done       History of Present Illness:    HPI  Patient here for acute visit,  Uses 3 L of oxygen via nasal cannula,  Oxygen concentrator at home stopped working,  Immediately our office worked with the [facility] [address] to get him oxygen,  History of alcoholism,  Major depression,    Past Medical History:    No past medical history on file. Reviewed all health maintenance requirements and ordered appropriate tests  Health Maintenance Due   Topic Date Due    Hepatitis C screen  Never done    COVID-19 Vaccine (1) Never done    Pneumococcal 0-64 years Vaccine (1 of 2 - PPSV23) Never done    HIV screen  Never done    DTaP/Tdap/Td vaccine (1 - Tdap) Never done    Colorectal Cancer Screen  Never done    Shingles Vaccine (1 of 2) Never done    Low dose CT lung screening  Never done    Flu vaccine (1) Never done       Past Surgical History:    No past surgical history on file.      Medications:      Current Outpatient Medications:     amLODIPine (NORVASC) 10 MG tablet, Take 1 tablet by mouth daily, Disp: 30 tablet, Rfl: 5    vitamin D (ERGOCALCIFEROL) 1.25 MG (59590 UT) CAPS capsule, Take 1 capsule by mouth once a week, Disp: 12 capsule, Rfl: 1    tiotropium (SPIRIVA HANDIHALER) 18 MCG inhalation capsule, Inhale 18 mcg into the lungs daily, Disp: , Rfl:     traZODone (DESYREL) 50 MG tablet, , Disp: , Rfl:     thiamine 100 MG tablet, , Disp: , Rfl:     folic acid (FOLVITE) 1 MG tablet, , Disp: , Rfl:     escitalopram (LEXAPRO) 10 MG tablet, , Disp: , Rfl:    SYMBICORT 160-4.5 MCG/ACT AERO, , Disp: , Rfl:     albuterol (PROVENTIL) (2.5 MG/3ML) 0.083% nebulizer solution, , Disp: , Rfl:     lidocaine 4 % external patch, Place 1 patch onto the skin daily (Patient not taking: Reported on 3/9/2022), Disp: 30 patch, Rfl: 0    sodium polystyrene (SPS) 15 GM/60ML suspension, Take 60 mLs by mouth 2 times daily for 4 doses, Disp: 240 mL, Rfl: 0    Allergies:      Patient has no known allergies. Social History:    Tobacco:    reports that he has been smoking. He has a 40.00 pack-year smoking history. He has never used smokeless tobacco.  Alcohol:      reports previous alcohol use. Drug Use:  reports previous drug use. Family History:    No family history on file.     Review of Systems:    Positive and Negative as described in HPI    Constitutional:  negative for  fevers, chills, sweats, fatigue, and weight loss  HEENT:  negative for vision or hearing changes,   Respiratory:  negative for shortness of breath, cough, or congestion  Cardiovascular:  negative for  chest pain, palpitations  Gastrointestinal:  negative for nausea, vomiting, diarrhea, constipation, abdominal pain  Genitourinary:  negative for frequency, dysuria  Integument/Breast:  negative for rash, skin lesions  Musculoskeletal:  negative for muscle aches or joint pain  Neurological:  negative for headaches, dizziness, lightheadedness, numbness, pain and tingling extrimities  Behavior/Psych:  negative for depression and anxiety      Physical Exam:    Vitals:  /78 (Site: Left Upper Arm, Position: Sitting, Cuff Size: Small Adult)   Pulse 86   Temp 97.9 °F (36.6 °C) (Temporal)   Ht 5' 7\" (1.702 m)   Wt 146 lb (66.2 kg)   SpO2 94% Comment: at room air  BMI 22.87 kg/m²     General appearance - alert, well appearing, and in no acute distress  Mental status - oriented to person, place, and time with normal affect  Head - normocephalic and atraumatic  Eyes - pupils equal and reactive, extraocular eye movements intact, conjunctiva clear  Ears - hearing appears to be intact  Nose - no drainage noted  Mouth - mucous membranes moist  Neck - supple, no carotid bruits, thyroid not palpable  Chest - clear to auscultation, normal effort  Heart - normal rate, regular rhythm, no murmurs  Abdomen - soft, nontender, nondistended, bowel sounds present all four quadrants, no masses, hepatomegaly or splenomegaly  Neurological - normal speech, no focal findings or movement disorder noted, cranial nerves II through XII grossly intact  Extremities - peripheral pulses palpable, no pedal edema or calf pain with palpation  Skin - no gross lesions, rashes, or induration noted      Data:    Lab Results   Component Value Date     02/10/2022    K 5.3 02/10/2022    CL 86 02/10/2022    CO2 28 02/10/2022    BUN 9 02/10/2022    CREATININE 0.60 02/10/2022    GLUCOSE 85 02/10/2022    PROT 7.0 01/28/2022    LABALBU 4.3 01/28/2022    BILITOT 0.25 01/28/2022    ALKPHOS 87 01/28/2022    AST 21 01/28/2022    ALT 11 01/28/2022     Lab Results   Component Value Date    WBC 7.1 01/28/2022    RBC 4.46 01/28/2022    HGB 13.3 01/28/2022    HCT 44.2 01/28/2022    MCV 99.1 01/28/2022    MCH 29.8 01/28/2022    MCHC 30.1 01/28/2022    RDW 11.7 01/28/2022     01/28/2022    MPV 9.8 01/28/2022     Lab Results   Component Value Date    TSH 0.55 01/28/2022     Lab Results   Component Value Date    CHOL 183 01/28/2022    HDL 59 01/28/2022    LABA1C 5.0 01/28/2022          Assessment & Plan:     Diagnosis Orders   1. Chronic respiratory failure with hypoxia ([facility]  DME Order for Home Oxygen as OP   2. Alcoholism ([facility]     3. Screening for colon cancer     4. Recurrent major depressive disorder, in partial remission ([facility]         1. Chronic respiratory failure with hypoxia (HCC)  -     DME Order for Home Oxygen as OP  2. Alcoholism ([facility]  3. Screening for colon cancer  4.  Recurrent major depressive disorder, in partial remission (HCC)     Chronic respiratory failure,  On 3 L of oxygen via nasal cannula,  Machine stopped working,  Immediately contacted oxygen company,  To get his oxygen,  Continue home medication,  Avoid alcohol,  Vitals reviewed          Completed Refills   Requested Prescriptions      No prescriptions requested or ordered in this encounter     Return if symptoms worsen or fail to improve. No orders of the defined types were placed in this encounter. Orders Placed This Encounter   Procedures    DME Order for Home Oxygen as OP     REPAIR OR REPLACE EXISTING UNIT- CONCENTRATOR WITH REFILLING ATTACHMENT.     Diagnosis: Chronic respiratory failure with hypoxia J96.11  Length of need: Lifetime     Scheduling Instructions:      Tay [address] @ [phone] TO SCHEDULE       Electronically signed by Emile Lugo MD on 3/12/2022 at 12:27 AM

## 2022-03-24 ENCOUNTER — TELEPHONE (OUTPATIENT)
Dept: INTERNAL MEDICINE CLINIC | Age: 57
End: 2022-03-24

## 2022-03-24 DIAGNOSIS — J96.11 CHRONIC RESPIRATORY FAILURE WITH HYPOXIA (HCC): Primary | ICD-10-CM

## 2022-03-24 NOTE — TELEPHONE ENCOUNTER
Pt needs new order for oxygen both standing and portable. Also stating Gracie Rivas told him he has no refills on any of his meds asking that you send over.

## 2022-03-25 RX ORDER — FOLIC ACID 1 MG/1
1 TABLET ORAL DAILY
Qty: 30 TABLET | Refills: 2 | Status: SHIPPED | OUTPATIENT
Start: 2022-03-25 | End: 2022-06-22 | Stop reason: SDUPTHER

## 2022-03-25 RX ORDER — LANOLIN ALCOHOL/MO/W.PET/CERES
100 CREAM (GRAM) TOPICAL DAILY
Qty: 30 TABLET | Refills: 2 | Status: SHIPPED | OUTPATIENT
Start: 2022-03-25 | End: 2022-06-22 | Stop reason: SDUPTHER

## 2022-03-25 RX ORDER — TRAZODONE HYDROCHLORIDE 50 MG/1
50 TABLET ORAL NIGHTLY
Qty: 30 TABLET | Refills: 2 | Status: SHIPPED | OUTPATIENT
Start: 2022-03-25 | End: 2022-06-22 | Stop reason: SDUPTHER

## 2022-03-25 RX ORDER — AMLODIPINE BESYLATE 10 MG/1
10 TABLET ORAL DAILY
Qty: 30 TABLET | Refills: 5 | Status: SHIPPED | OUTPATIENT
Start: 2022-03-25

## 2022-03-25 RX ORDER — ESCITALOPRAM OXALATE 10 MG/1
10 TABLET ORAL DAILY
Qty: 30 TABLET | Refills: 2 | Status: SHIPPED | OUTPATIENT
Start: 2022-03-25 | End: 2022-06-22 | Stop reason: SDUPTHER

## 2022-03-25 RX ORDER — BUDESONIDE AND FORMOTEROL FUMARATE DIHYDRATE 160; 4.5 UG/1; UG/1
2 AEROSOL RESPIRATORY (INHALATION) DAILY
Qty: 10.2 G | Refills: 2 | Status: SHIPPED | OUTPATIENT
Start: 2022-03-25 | End: 2022-06-22 | Stop reason: SDUPTHER

## 2022-03-25 RX ORDER — TIOTROPIUM BROMIDE 18 UG/1
18 CAPSULE ORAL; RESPIRATORY (INHALATION) DAILY
Qty: 30 CAPSULE | Refills: 2 | Status: SHIPPED | OUTPATIENT
Start: 2022-03-25 | End: 2022-06-22 | Stop reason: SDUPTHER

## 2022-03-28 ENCOUNTER — TELEPHONE (OUTPATIENT)
Dept: INTERNAL MEDICINE CLINIC | Age: 57
End: 2022-03-28

## 2022-03-28 NOTE — TELEPHONE ENCOUNTER
Insurance will not cover Symbicort please select one of the following:  Budesonide  Pulmicort flexhaler  Asmanex  Spiriva Respimat    Please advise or send one

## 2022-04-15 ENCOUNTER — TELEPHONE (OUTPATIENT)
Dept: ONCOLOGY | Age: 57
End: 2022-04-15

## 2022-04-15 NOTE — LETTER
4/15/2022        [facility] [address] [place] [id number]    Dear Pinky El: Your healthcare provider has ordered a low dose CT scan of the chest for lung cancer screening. You will find enclosed, information about CT lung screening. Please review the statement of understanding, you will be asked to sign a copy of this at the time of your CT scan    If you have not already been contacted to make the appointment for your scan, please call our scheduling department at [phone]    Keep in mind that CT lung screening does not take the place of smoking cessation. If you are a current smoker, you will find enclosed smoking cessation resources. Please do not hesitate to contact me if you have any questions or concerns.     [address] [facility],      [facility] Lung Screening Program  [phone]

## 2022-04-21 ENCOUNTER — HOSPITAL ENCOUNTER (OUTPATIENT)
Dept: CT IMAGING | Age: 57
Discharge: HOME | End: 2022-04-23
Payer: COMMERCIAL

## 2022-04-21 DIAGNOSIS — Z87.891 PERSONAL HISTORY OF SMOKING: ICD-10-CM

## 2022-04-21 PROCEDURE — 71271 CT THORAX LUNG CANCER SCR C-: CPT

## 2022-06-07 ENCOUNTER — HOSPITAL ENCOUNTER (EMERGENCY)
Age: 57
Discharge: HOME | End: 2022-06-07
Attending: STUDENT IN AN ORGANIZED HEALTH CARE EDUCATION/TRAINING PROGRAM
Payer: COMMERCIAL

## 2022-06-07 ENCOUNTER — APPOINTMENT (OUTPATIENT)
Dept: GENERAL RADIOLOGY | Age: 57
End: 2022-06-07
Payer: COMMERCIAL

## 2022-06-07 VITALS
TEMPERATURE: 97.7 F | HEART RATE: 93 BPM | DIASTOLIC BLOOD PRESSURE: 75 MMHG | SYSTOLIC BLOOD PRESSURE: 117 MMHG | OXYGEN SATURATION: 98 % | RESPIRATION RATE: 18 BRPM

## 2022-06-07 DIAGNOSIS — J44.1 COPD EXACERBATION (HCC): Primary | ICD-10-CM

## 2022-06-07 LAB
ABSOLUTE EOS #: 0.1 K/UL (ref 0–0.4)
ABSOLUTE LYMPH #: 1.4 K/UL (ref 1–4.8)
ABSOLUTE MONO #: 0.4 K/UL (ref 0.1–1.3)
ALBUMIN SERPL-MCNC: 4.3 G/DL (ref 3.5–5.2)
ALP BLD-CCNC: 92 U/L (ref 40–129)
ALT SERPL-CCNC: 10 U/L (ref 5–41)
ANION GAP SERPL CALCULATED.3IONS-SCNC: 9 MMOL/L (ref 9–17)
AST SERPL-CCNC: 15 U/L
BASOPHILS # BLD: 1 % (ref 0–2)
BASOPHILS ABSOLUTE: 0 K/UL (ref 0–0.2)
BILIRUB SERPL-MCNC: <0.15 MG/DL (ref 0.3–1.2)
BUN BLDV-MCNC: 7 MG/DL (ref 6–20)
CALCIUM SERPL-MCNC: 8.9 MG/DL (ref 8.6–10.4)
CHLORIDE BLD-SCNC: 96 MMOL/L (ref 98–107)
CO2: 32 MMOL/L (ref 20–31)
CREAT SERPL-MCNC: 0.68 MG/DL (ref 0.7–1.2)
D-DIMER QUANTITATIVE: 0.37 MG/L FEU (ref 0–0.59)
EOSINOPHILS RELATIVE PERCENT: 2 % (ref 0–4)
GFR AFRICAN AMERICAN: >60 ML/MIN
GFR NON-AFRICAN AMERICAN: >60 ML/MIN
GFR SERPL CREATININE-BSD FRML MDRD: ABNORMAL ML/MIN/{1.73_M2}
GLUCOSE BLD-MCNC: 78 MG/DL (ref 70–99)
HCT VFR BLD CALC: 39.5 % (ref 41–53)
HEMOGLOBIN: 13 G/DL (ref 13.5–17.5)
INFLUENZA A: NOT DETECTED
INFLUENZA B: NOT DETECTED
LIPASE: 22 U/L (ref 13–60)
LYMPHOCYTES # BLD: 27 % (ref 24–44)
MAGNESIUM: 2 MG/DL (ref 1.6–2.6)
MCH RBC QN AUTO: 30.4 PG (ref 26–34)
MCHC RBC AUTO-ENTMCNC: 32.9 G/DL (ref 31–37)
MCV RBC AUTO: 92.2 FL (ref 80–100)
MONOCYTES # BLD: 7 % (ref 1–7)
PDW BLD-RTO: 13.8 % (ref 11.5–14.9)
PLATELET # BLD: 251 K/UL (ref 150–450)
PMV BLD AUTO: 6.3 FL (ref 6–12)
POTASSIUM SERPL-SCNC: 4.1 MMOL/L (ref 3.7–5.3)
PRO-BNP: 49 PG/ML
RBC # BLD: 4.28 M/UL (ref 4.5–5.9)
SARS-COV-2 RNA, RT PCR: NOT DETECTED
SEG NEUTROPHILS: 63 % (ref 36–66)
SEGMENTED NEUTROPHILS ABSOLUTE COUNT: 3.2 K/UL (ref 1.3–9.1)
SODIUM BLD-SCNC: 137 MMOL/L (ref 135–144)
SOURCE: NORMAL
SPECIMEN DESCRIPTION: NORMAL
TOTAL PROTEIN: 7.2 G/DL (ref 6.4–8.3)
TROPONIN, HIGH SENSITIVITY: 6 NG/L (ref 0–22)
TROPONIN, HIGH SENSITIVITY: 7 NG/L (ref 0–22)
WBC # BLD: 5.2 K/UL (ref 3.5–11)

## 2022-06-07 PROCEDURE — 84484 ASSAY OF TROPONIN QUANT: CPT

## 2022-06-07 PROCEDURE — 71045 X-RAY EXAM CHEST 1 VIEW: CPT

## 2022-06-07 PROCEDURE — 94640 AIRWAY INHALATION TREATMENT: CPT

## 2022-06-07 PROCEDURE — 2500000003 HC RX 250 WO HCPCS: Performed by: STUDENT IN AN ORGANIZED HEALTH CARE EDUCATION/TRAINING PROGRAM

## 2022-06-07 PROCEDURE — 87636 SARSCOV2 & INF A&B AMP PRB: CPT

## 2022-06-07 PROCEDURE — 36415 COLL VENOUS BLD VENIPUNCTURE: CPT

## 2022-06-07 PROCEDURE — 83735 ASSAY OF MAGNESIUM: CPT

## 2022-06-07 PROCEDURE — 99285 EMERGENCY DEPT VISIT HI MDM: CPT

## 2022-06-07 PROCEDURE — 6370000000 HC RX 637 (ALT 250 FOR IP): Performed by: STUDENT IN AN ORGANIZED HEALTH CARE EDUCATION/TRAINING PROGRAM

## 2022-06-07 PROCEDURE — 85379 FIBRIN DEGRADATION QUANT: CPT

## 2022-06-07 PROCEDURE — 96374 THER/PROPH/DIAG INJ IV PUSH: CPT

## 2022-06-07 PROCEDURE — 83690 ASSAY OF LIPASE: CPT

## 2022-06-07 PROCEDURE — 94761 N-INVAS EAR/PLS OXIMETRY MLT: CPT

## 2022-06-07 PROCEDURE — 93005 ELECTROCARDIOGRAM TRACING: CPT | Performed by: STUDENT IN AN ORGANIZED HEALTH CARE EDUCATION/TRAINING PROGRAM

## 2022-06-07 PROCEDURE — 83880 ASSAY OF NATRIURETIC PEPTIDE: CPT

## 2022-06-07 PROCEDURE — 2580000003 HC RX 258: Performed by: STUDENT IN AN ORGANIZED HEALTH CARE EDUCATION/TRAINING PROGRAM

## 2022-06-07 PROCEDURE — 85025 COMPLETE CBC W/AUTO DIFF WBC: CPT

## 2022-06-07 PROCEDURE — 80053 COMPREHEN METABOLIC PANEL: CPT

## 2022-06-07 PROCEDURE — 96375 TX/PRO/DX INJ NEW DRUG ADDON: CPT

## 2022-06-07 PROCEDURE — 6360000002 HC RX W HCPCS: Performed by: STUDENT IN AN ORGANIZED HEALTH CARE EDUCATION/TRAINING PROGRAM

## 2022-06-07 PROCEDURE — A4216 STERILE WATER/SALINE, 10 ML: HCPCS | Performed by: STUDENT IN AN ORGANIZED HEALTH CARE EDUCATION/TRAINING PROGRAM

## 2022-06-07 RX ORDER — LIDOCAINE HYDROCHLORIDE 20 MG/ML
15 SOLUTION OROPHARYNGEAL
Status: DISCONTINUED | OUTPATIENT
Start: 2022-06-07 | End: 2022-06-08 | Stop reason: HOSPADM

## 2022-06-07 RX ORDER — MAGNESIUM HYDROXIDE/ALUMINUM HYDROXICE/SIMETHICONE 120; 1200; 1200 MG/30ML; MG/30ML; MG/30ML
30 SUSPENSION ORAL ONCE
Status: COMPLETED | OUTPATIENT
Start: 2022-06-07 | End: 2022-06-07

## 2022-06-07 RX ORDER — METHYLPREDNISOLONE SODIUM SUCCINATE 125 MG/2ML
125 INJECTION, POWDER, LYOPHILIZED, FOR SOLUTION INTRAMUSCULAR; INTRAVENOUS ONCE
Status: COMPLETED | OUTPATIENT
Start: 2022-06-07 | End: 2022-06-07

## 2022-06-07 RX ORDER — ALBUTEROL SULFATE 2.5 MG/3ML
2.5 SOLUTION RESPIRATORY (INHALATION) 4 TIMES DAILY
Qty: 120 EACH | Refills: 0 | Status: SHIPPED | OUTPATIENT
Start: 2022-06-07

## 2022-06-07 RX ORDER — DOXYCYCLINE HYCLATE 100 MG
100 TABLET ORAL 2 TIMES DAILY
Qty: 10 TABLET | Refills: 0 | Status: SHIPPED | OUTPATIENT
Start: 2022-06-07 | End: 2022-06-12

## 2022-06-07 RX ORDER — IPRATROPIUM BROMIDE AND ALBUTEROL SULFATE 2.5; .5 MG/3ML; MG/3ML
1 SOLUTION RESPIRATORY (INHALATION)
Status: DISCONTINUED | OUTPATIENT
Start: 2022-06-07 | End: 2022-06-08 | Stop reason: HOSPADM

## 2022-06-07 RX ORDER — ASPIRIN 81 MG/1
324 TABLET, CHEWABLE ORAL ONCE
Status: COMPLETED | OUTPATIENT
Start: 2022-06-07 | End: 2022-06-07

## 2022-06-07 RX ORDER — PREDNISONE 50 MG/1
50 TABLET ORAL DAILY
Qty: 5 TABLET | Refills: 0 | Status: SHIPPED | OUTPATIENT
Start: 2022-06-07 | End: 2022-06-12

## 2022-06-07 RX ADMIN — ALUMINUM HYDROXIDE, MAGNESIUM HYDROXIDE, AND SIMETHICONE 30 ML: 200; 200; 20 SUSPENSION ORAL at 19:35

## 2022-06-07 RX ADMIN — METHYLPREDNISOLONE SODIUM SUCCINATE 125 MG: 125 INJECTION, POWDER, FOR SOLUTION INTRAMUSCULAR; INTRAVENOUS at 21:25

## 2022-06-07 RX ADMIN — IPRATROPIUM BROMIDE AND ALBUTEROL SULFATE 1 AMPULE: 2.5; .5 SOLUTION RESPIRATORY (INHALATION) at 20:57

## 2022-06-07 RX ADMIN — LIDOCAINE HYDROCHLORIDE 15 ML: 20 SOLUTION ORAL; TOPICAL at 19:35

## 2022-06-07 RX ADMIN — FAMOTIDINE 20 MG: 10 INJECTION, SOLUTION INTRAVENOUS at 19:37

## 2022-06-07 RX ADMIN — ASPIRIN 324 MG: 81 TABLET, CHEWABLE ORAL at 19:34

## 2022-06-07 ASSESSMENT — ENCOUNTER SYMPTOMS
NAUSEA: 0
COUGH: 1
CONSTIPATION: 0
SHORTNESS OF BREATH: 1
DIARRHEA: 0
BLOOD IN STOOL: 0
VOMITING: 0
ABDOMINAL PAIN: 1

## 2022-06-07 NOTE — ED PROVIDER NOTES
[address] ED  Emergency Department Encounter  Emergency Medicine Resident     Pt Name: Colleen Tafoya  RQ  Birthdate 1965  Date of evaluation: 22  PCP:  Lanette Soares MD    [address] [place] [address]       Chief Complaint   Patient presents with    Shortness of Breath    Chest Pain     HISTORY OF PRESENT ILLNESS  (Location/Symptom, Timing/Onset, Context/Setting, Quality, Duration, ModifyingFactors, Severity.)      Colleen Tafoya is a 61 y.o. male with PMH of COPD on 4-5 L O2, alcohol use disorder presents for evaluation of chest pain and shortness of breath x4 days. Complaining of burning retrosternal pain. Mild epigastric pain/nausea as well. Patient has chronic cough that is worse than baseline with increased sputum production. No fever, palpitations, vomiting, diarrhea, constipation, urinary symptoms, testicular pain, flank pain. Patient has CT results from lung cancer screening on 2022 which showed exophytic nodule on left anterior kidney and 4 mm pulmonary nodule posterior left lower lobe. Has not had this worked up yet. No history of blood clots. No recent travel or surgery. No hormone use. PAST MEDICAL / SURGICAL / SOCIAL /FAMILY HISTORY      has no past medical history on file. No other pertinent PMH on review with patient/guardian. has no past surgical history on file. No other pertinent PSH on review with patient/guardian.   Social History     Socioeconomic History    Marital status: Single     Spouse name: Not on file    Number of children: Not on file    Years of education: Not on file    Highest education level: Not on file   Occupational History    Not on file   Tobacco Use    Smoking status: Current Every Day Smoker     Packs/day: 1.00     Years: 43.00     Pack years: 43.00    Smokeless tobacco: Never Used   Substance and Sexual Activity    Alcohol use: Not Currently    Drug use: Not Currently    Sexual activity: Not on file   Other Topics Concern    Not on file   Social History Narrative    Not on file     Social Determinants of Health     Financial Resource Strain: Low Risk     Difficulty of Paying Living Expenses: Not hard at all   Food Insecurity: No Food Insecurity    Worried About Running Out of Food in the Last Year: Never true    [address] in the Last Year: Never true   Transportation Needs:     Lack of Transportation (Medical): Not on file    Lack of Transportation (Non-Medical): Not on file   Physical Activity:     Days of Exercise per Week: Not on file    Minutes of Exercise per Session: Not on file   Stress:     Feeling of Stress : Not on file   Social Connections:     Frequency of Communication with Friends and Family: Not on file    Frequency of Social Gatherings with Friends and Family: Not on file    Attends Congregation Services: Not on file    Active Member of [address] [place] [address] [employer] or Organizations: Not on file    Attends Club or Organization Meetings: Not on file    Marital Status: Not on file   Intimate Partner Violence:     Fear of Current or Ex-Partner: Not on file    Emotionally Abused: Not on file    Physically Abused: Not on file    Sexually Abused: Not on file   Housing Stability:     Unable to Pay for Housing in the Last Year: Not on file    Number of [place] in the Last Year: Not on file    Unstable Housing in the Last Year: Not on file       I counseled the patient against using tobacco products. History reviewed. No pertinent family history. No other pertinent FamHx on review with patient/guardian. Allergies:  Patient has no known allergies. Home Medications:  Prior to Admission medications    Medication Sig Start Date End Date Taking?  Authorizing Provider   albuterol (PROVENTIL) (2.5 MG/3ML) 0.083% nebulizer solution Take 3 mLs by nebulization 4 times daily 6/7/22  Yes Santo Purcell DO   predniSONE (DELTASONE) 50 MG tablet Take 1 tablet by mouth daily for 5 days 6/7/22 6/12/22 Yes Leonela Carrier Marlo Abernathy DO   doxycycline hyclate (VIBRA-TABS) 100 MG tablet Take 1 tablet by mouth 2 times daily for 5 days 6/7/22 6/12/22 Yes Ramon Mon DO   mometasone (ASMANEX, 30 METERED DOSES,) 220 MCG/INH inhaler Inhale 1 puff into the lungs daily 3/29/22   Miladys Mercado MD   escitalopram (LEXAPRO) 10 MG tablet Take 1 tablet by mouth daily 3/25/22   Miladys Mercado MD   folic acid (FOLVITE) 1 MG tablet Take 1 tablet by mouth daily 3/25/22   Miladys Mercado MD   SYMBICORT 160-4.5 MCG/ACT AERO Inhale 2 puffs into the lungs daily 3/25/22   Miladys Mercado MD   traZODone (DESYREL) 50 MG tablet Take 1 tablet by mouth nightly 3/25/22   Miladys Mercado MD   tiotropium (SPIRIVA HANDIHALER) 18 MCG inhalation capsule Inhale 1 capsule into the lungs daily 3/25/22   Miladys Mercado MD   thiamine 100 MG tablet Take 1 tablet by mouth daily 3/25/22   Miladys Mercado MD   amLODIPine (NORVASC) 10 MG tablet Take 1 tablet by mouth daily 3/25/22   Miladys Mercado MD   sodium polystyrene (SPS) 15 GM/60ML suspension Take 60 mLs by mouth 2 times daily for 4 doses 1/29/22 1/31/22  Miladys Mercado MD   vitamin D (ERGOCALCIFEROL) 1.25 MG (62163 UT) CAPS capsule Take 1 capsule by mouth once a week 1/29/22   Miladys Mercado MD       REVIEW OF SYSTEMS    (2-9 systems for level 4, 10 ormore for level 5)      Review of Systems   Constitutional: Negative for chills and fever. Eyes: Negative for visual disturbance. Respiratory: Positive for cough and shortness of breath. Cardiovascular: Positive for chest pain. Negative for palpitations. Gastrointestinal: Positive for abdominal pain. Negative for blood in stool, constipation, diarrhea, nausea and vomiting. Genitourinary: Negative for dysuria, flank pain, hematuria, testicular pain and urgency. Skin: Negative for rash. Allergic/Immunologic: Negative for immunocompromised state. Neurological: Negative for weakness and headaches. Hematological: Does not bruise/bleed easily. PHYSICAL EXAM   (up to 7 for level 4, 8 or more for level 5)      INITIAL VITALS:   /75   Pulse 93   Temp 97.7 °F (36.5 °C)   Resp 18   SpO2 98%     Physical Exam  Constitutional:       General: He is not in acute distress. Appearance: Normal appearance. He is not ill-appearing, toxic-appearing or diaphoretic. HENT:      Head: Normocephalic and atraumatic. Right Ear: External ear normal.      Left Ear: External ear normal.   Eyes:      General:         Right eye: No discharge. Left eye: No discharge. Cardiovascular:      Rate and Rhythm: Normal rate and regular rhythm. Pulses: Normal pulses. Heart sounds: No murmur heard. Pulmonary:      Effort: Pulmonary effort is normal. No respiratory distress. Breath sounds: Wheezing present. No rhonchi or rales. Abdominal:      Palpations: Abdomen is soft. Tenderness: There is abdominal tenderness (Mild epigastric). There is no right CVA tenderness, left CVA tenderness, guarding or rebound. Musculoskeletal:      Right lower leg: No edema. Left lower leg: No edema. Skin:     Capillary Refill: Capillary refill takes less than 2 seconds. Neurological:      General: No focal deficit present. Mental Status: He is alert. DIFFERENTIAL  DIAGNOSIS     PLAN (LABS / IMAGING / EKG):  Orders Placed This Encounter   Procedures    COVID-19 & Influenza Combo    XR CHEST PORTABLE    CBC with Auto Differential    Magnesium    Comprehensive Metabolic Panel    D-Dimer, Quantitative    Troponin    Lipase    Brain Natriuretic Peptide    EKG 12 Lead       MEDICATIONS ORDERED:  Orders Placed This Encounter   Medications    ipratropium-albuterol (DUONEB) nebulizer solution 1 ampule     Order Specific Question:   Initiate RT Bronchodilator Protocol     Answer:    Yes    aspirin chewable tablet 324 mg    famotidine (PEPCID) 20 mg in sodium chloride (PF) 10 mL injection    aluminum & magnesium hydroxide-simethicone (MAALOX) 454-447-73 MG/5ML suspension 30 mL    lidocaine viscous hcl (XYLOCAINE) 2 % solution 15 mL    methylPREDNISolone sodium (SOLU-MEDROL) injection 125 mg    albuterol (PROVENTIL) (2.5 MG/3ML) 0.083% nebulizer solution     Sig: Take 3 mLs by nebulization 4 times daily     Dispense:  120 each     Refill:  0    predniSONE (DELTASONE) 50 MG tablet     Sig: Take 1 tablet by mouth daily for 5 days     Dispense:  5 tablet     Refill:  0    doxycycline hyclate (VIBRA-TABS) 100 MG tablet     Sig: Take 1 tablet by mouth 2 times daily for 5 days     Dispense:  10 tablet     Refill:  0       DIAGNOSTIC RESULTS / EMERGENCY DEPARTMENT COURSE / MDM     LABS:  Results for orders placed or performed during the hospital encounter of 06/07/22   COVID-19 & Influenza Combo    Specimen: Nasopharyngeal Swab   Result Value Ref Range    Specimen Description . NASOPHARYNGEAL SWAB     Source . NASOPHARYNGEAL SWAB     SARS-CoV-2 RNA, RT PCR Not Detected Not Detected    INFLUENZA A Not Detected Not Detected    INFLUENZA B Not Detected Not Detected   CBC with Auto Differential   Result Value Ref Range    WBC 5.2 3.5 - 11.0 k/uL    RBC 4.28 (L) 4.5 - 5.9 m/uL    Hemoglobin 13.0 (L) 13.5 - 17.5 g/dL    Hematocrit 39.5 (L) 41 - 53 %    MCV 92.2 80 - 100 fL    MCH 30.4 26 - 34 pg    MCHC 32.9 31 - 37 g/dL    RDW 13.8 11.5 - 14.9 %    Platelets 733 592 - 963 k/uL    MPV 6.3 6.0 - 12.0 fL    Seg Neutrophils 63 36 - 66 %    Lymphocytes 27 24 - 44 %    Monocytes 7 1 - 7 %    Eosinophils % 2 0 - 4 %    Basophils 1 0 - 2 %    Segs Absolute 3.20 1.3 - 9.1 k/uL    Absolute Lymph # 1.40 1.0 - 4.8 k/uL    Absolute Mono # 0.40 0.1 - 1.3 k/uL    Absolute Eos # 0.10 0.0 - 0.4 k/uL    Basophils Absolute 0.00 0.0 - 0.2 k/uL   Magnesium   Result Value Ref Range    Magnesium 2.0 1.6 - 2.6 mg/dL   Comprehensive Metabolic Panel   Result Value Ref Range    Glucose 78 70 - 99 mg/dL    BUN 7 6 - 20 mg/dL    CREATININE 0.68 (L) 0.70 - 1.20 deferred. We will treat symptomatically with DuoNeb and Solu-Medrol. ED Course as of 06/07/22 2258 Tue Jun 07, 2022 2114 Comprehensive Metabolic Panel(!):    GLUCOSE, FASTING,GF 78   BUN,BUNPL 7   Creatinine 0.68(!)   CALCIUM, SERUM, 324697 8.9   Sodium 137   Potassium 4.1   Chloride 96(!)   CO2 32(!)   Anion Gap 9   Alk Phos 92   ALT 10   AST 15   Bilirubin <0.15(!)   Total Protein 7.2   Albumin 4.3   GFR Non- >60   GFR  >60   GFR Comment      [AF]   2114 CBC with Auto Differential(!):    WBC 5.2   RBC 4.28(!)   Hemoglobin Quant 13. 0(!)   Hematocrit 39.5(!)   MCV 92.2   MCH 30.4   MCHC 32.9   RDW 13.8   Platelet Count 479   MPV 6.3   Seg Neutrophils 63   Lymphocytes 27   Monocytes 7   Eosinophils % 2   Basophils 1   Segs Absolute 3.20   Absolute Lymph # 1.40   Absolute Mono # 0.40   Absolute Eos # 0.10   Basophils Absolute 0.00 [AF]   2114 Magnesium:    Magnesium 2.0 [AF]   2114 COVID-19 & Influenza Combo:    Specimen Description . NASOPHARYNGEAL SWAB   SOURCE, [id number] . NASOPHARYNGEAL SWAB   SARS-CoV-2 RNA, RT PCR Not Detected   INFLUENZA A Not Detected   INFLUENZA B Not Detected [AF]   2114 Brain Natriuretic Peptide:    Pro-BNP 49 [AF]   2114 Lipase:    Lipase 22 [AF]   2114 Troponin:    Troponin, High Sensitivity 6 [AF]   2114 D-Dimer, Quantitative:    D-Dimer, Quant 0.37 [AF]   8758 Patient reevaluated with improvement in symptoms. Will discharge with prednisone and doxycycline. Patient has renal mass from previous study, recommend calling PCP tomorrow for follow-up appointment as soon as possible. Discussed possibility that this may be malignant. I discussed signs and symptoms that would require reevaluation in the ED. The patient expressed understanding and agreement with plan. All questions answered.  [AF]      ED Course User Index  [AF] Marvin Irving DO     RADIOLOGY:  XR CHEST PORTABLE   Final Result   Emphysematous changes in the lungs without acute abnormality.              EKG  EKG Interpretation    Interpreted by me    Rhythm: normal sinus   Rate: normal  Axis: normal  Ectopy: none  Conduction: normal  ST Segments: no acute change  T Waves: no acute change  Q Waves: none    Clinical Impression: no acute changes and normal EKG    All EKG's are interpreted by the Emergency Department Physician who either signs or Co-signs this chart in the absence of a cardiologist.    PROCEDURES:  None    CONSULTS:  None    FINAL IMPRESSION      1. COPD exacerbation ([address]          DISPOSITION / PLAN     DISPOSITION Decision To Discharge 06/07/2022 10:49:44 PM      PATIENT REFERREDTO:  MD Gisela Toscano YOB[facility] [address]  [phone]    Schedule an appointment as soon as possible for a visit         DISCHARGE MEDICATIONS:  New Prescriptions    DOXYCYCLINE HYCLATE (VIBRA-TABS) 100 MG TABLET    Take 1 tablet by mouth 2 times daily for 5 days    PREDNISONE (DELTASONE) 50 MG TABLET    Take 1 tablet by mouth daily for 5 days       Yevgeiny Sams DO  PGY 2  Resident Physician Emergency Medicine  06/07/22 10:58 PM    (Please note that portions of this note were completed with a voice recognition program.Efforts were made to edit the dictations but occasionally words are mis-transcribed.)       Maureen Blackwell DO  Resident  06/07/22 7744

## 2022-06-08 ENCOUNTER — TELEPHONE (OUTPATIENT)
Dept: INTERNAL MEDICINE CLINIC | Age: 57
End: 2022-06-08

## 2022-06-08 LAB
EKG ATRIAL RATE: 87 BPM
EKG P AXIS: 75 DEGREES
EKG P-R INTERVAL: 150 MS
EKG Q-T INTERVAL: 356 MS
EKG QRS DURATION: 84 MS
EKG QTC CALCULATION (BAZETT): 428 MS
EKG R AXIS: 77 DEGREES
EKG T AXIS: 76 DEGREES
EKG VENTRICULAR RATE: 87 BPM

## 2022-06-08 PROCEDURE — 93010 ELECTROCARDIOGRAM REPORT: CPT | Performed by: INTERNAL MEDICINE

## 2022-06-08 NOTE — ED NOTES
Pt discharged in stable condition with prescriptions and dc instructions. Pt ambulates to door with steady gait and without assistance.         Radha Shultz RN  06/07/22 8492

## 2022-06-08 NOTE — ED PROVIDER NOTES
EMERGENCY DEPARTMENT ENCOUNTER   ATTENDING ATTESTATION     Pt Name: Kyle Herring  MRN: [id number]  [place] 1965  Date of evaluation: 6/7/22       Kyle Herring is a 61 y.o. male who presents with Shortness of Breath and Chest Pain      MDM:     69-year-old presenting with difficulty breathing    Work-up unremarkable. COPD exacerbation. Discharge home. Vitals:   Vitals:    06/07/22 1857 06/07/22 2057 06/07/22 2100   BP: (!) 140/83  122/84   Pulse: 93 93    Resp: 20 18    Temp: 97.7 °F (36.5 °C)     SpO2: 95% 98% 98%         I personally saw and examined the patient. I have reviewed and agree with the resident's findings, including all diagnostic interpretations and treatment plan as written. I was present for the key portions of any procedures performed and the inclusive time noted for any critical care statement. The care is provided during an unprecedented national emergency due to the novel coronavirus, COVID 19.   Leny Marie MD  Attending Emergency Physician            Leny Marie MD  06/07/22 9726

## 2022-06-08 NOTE — TELEPHONE ENCOUNTER
Stephanie states that she drop off a paper from pulmonologist of a test that needs to be ordered she would like for it to be ordered as soon as possible as she had patient in the ER on 6/7/22.     Please advise

## 2022-06-22 ENCOUNTER — APPOINTMENT (OUTPATIENT)
Dept: CT IMAGING | Age: 57
DRG: 140 | End: 2022-06-22
Payer: COMMERCIAL

## 2022-06-22 ENCOUNTER — OFFICE VISIT (OUTPATIENT)
Dept: INTERNAL MEDICINE CLINIC | Age: 57
End: 2022-06-22
Payer: COMMERCIAL

## 2022-06-22 ENCOUNTER — APPOINTMENT (OUTPATIENT)
Dept: GENERAL RADIOLOGY | Age: 57
DRG: 140 | End: 2022-06-22
Payer: COMMERCIAL

## 2022-06-22 ENCOUNTER — HOSPITAL ENCOUNTER (INPATIENT)
Age: 57
LOS: 2 days | Discharge: HOME | DRG: 140 | End: 2022-06-24
Attending: EMERGENCY MEDICINE | Admitting: INTERNAL MEDICINE
Payer: COMMERCIAL

## 2022-06-22 VITALS
DIASTOLIC BLOOD PRESSURE: 82 MMHG | WEIGHT: 140.4 LBS | BODY MASS INDEX: 22.03 KG/M2 | SYSTOLIC BLOOD PRESSURE: 124 MMHG | OXYGEN SATURATION: 86 % | HEART RATE: 99 BPM | HEIGHT: 67 IN

## 2022-06-22 DIAGNOSIS — F18.10 ABUSE OF SMOKED SUBSTANCE (HCC): ICD-10-CM

## 2022-06-22 DIAGNOSIS — F10.20 ALCOHOLISM (HCC): ICD-10-CM

## 2022-06-22 DIAGNOSIS — J96.11 CHRONIC RESPIRATORY FAILURE WITH HYPOXIA (HCC): ICD-10-CM

## 2022-06-22 DIAGNOSIS — J44.1 COPD EXACERBATION (HCC): ICD-10-CM

## 2022-06-22 DIAGNOSIS — N28.89 RENAL MASS, LEFT: ICD-10-CM

## 2022-06-22 DIAGNOSIS — J96.21 ACUTE ON CHRONIC RESPIRATORY FAILURE WITH HYPOXIA (HCC): Primary | ICD-10-CM

## 2022-06-22 DIAGNOSIS — E55.9 VITAMIN D DEFICIENCY: ICD-10-CM

## 2022-06-22 DIAGNOSIS — F33.41 RECURRENT MAJOR DEPRESSIVE DISORDER, IN PARTIAL REMISSION (HCC): ICD-10-CM

## 2022-06-22 DIAGNOSIS — J44.9 CHRONIC OBSTRUCTIVE PULMONARY DISEASE, UNSPECIFIED COPD TYPE (HCC): ICD-10-CM

## 2022-06-22 DIAGNOSIS — I10 BENIGN ESSENTIAL HTN: ICD-10-CM

## 2022-06-22 DIAGNOSIS — J44.1 CHRONIC OBSTRUCTIVE PULMONARY DISEASE WITH ACUTE EXACERBATION (HCC): ICD-10-CM

## 2022-06-22 PROBLEM — J96.00 ACUTE RESPIRATORY FAILURE (HCC): Status: ACTIVE | Noted: 2022-01-01

## 2022-06-22 LAB
ABSOLUTE EOS #: 0.1 K/UL (ref 0–0.4)
ABSOLUTE LYMPH #: 1.1 K/UL (ref 1–4.8)
ABSOLUTE MONO #: 0.5 K/UL (ref 0.1–1.3)
ALBUMIN SERPL-MCNC: 4 G/DL (ref 3.5–5.2)
ALP BLD-CCNC: 79 U/L (ref 40–129)
ALT SERPL-CCNC: 8 U/L (ref 5–41)
ANION GAP SERPL CALCULATED.3IONS-SCNC: 8 MMOL/L (ref 9–17)
AST SERPL-CCNC: 10 U/L
BASOPHILS # BLD: 1 % (ref 0–2)
BASOPHILS ABSOLUTE: 0 K/UL (ref 0–0.2)
BILIRUB SERPL-MCNC: 0.19 MG/DL (ref 0.3–1.2)
BUN BLDV-MCNC: 13 MG/DL (ref 6–20)
CALCIUM SERPL-MCNC: 9.3 MG/DL (ref 8.6–10.4)
CARBOXYHEMOGLOBIN: 4.3 % (ref 0–5)
CHLORIDE BLD-SCNC: 97 MMOL/L (ref 98–107)
CO2: 33 MMOL/L (ref 20–31)
CREAT SERPL-MCNC: 0.58 MG/DL (ref 0.7–1.2)
EOSINOPHILS RELATIVE PERCENT: 1 % (ref 0–4)
GFR AFRICAN AMERICAN: >60 ML/MIN
GFR NON-AFRICAN AMERICAN: >60 ML/MIN
GFR SERPL CREATININE-BSD FRML MDRD: ABNORMAL ML/MIN/{1.73_M2}
GLUCOSE BLD-MCNC: 132 MG/DL (ref 70–99)
HCO3 VENOUS: 36.5 MMOL/L (ref 24–30)
HCT VFR BLD CALC: 39.2 % (ref 41–53)
HEMOGLOBIN: 12.5 G/DL (ref 13.5–17.5)
INFLUENZA A: NOT DETECTED
INFLUENZA B: NOT DETECTED
LYMPHOCYTES # BLD: 16 % (ref 24–44)
MCH RBC QN AUTO: 29.6 PG (ref 26–34)
MCHC RBC AUTO-ENTMCNC: 32 G/DL (ref 31–37)
MCV RBC AUTO: 92.5 FL (ref 80–100)
METHEMOGLOBIN: 0.9 % (ref 0–1.9)
MONOCYTES # BLD: 7 % (ref 1–7)
O2 SAT, VEN: 77.8 % (ref 60–85)
PCO2, VEN: 63 (ref 39–55)
PDW BLD-RTO: 13.7 % (ref 11.5–14.9)
PH VENOUS: 7.37 (ref 7.32–7.42)
PLATELET # BLD: 266 K/UL (ref 150–450)
PMV BLD AUTO: 6.8 FL (ref 6–12)
PO2, VEN: 45.8 (ref 30–50)
POSITIVE BASE EXCESS, VEN: 11.3 MMOL/L (ref 0–2)
POTASSIUM SERPL-SCNC: 4.1 MMOL/L (ref 3.7–5.3)
PROCALCITONIN: 0.05 NG/ML
RBC # BLD: 4.24 M/UL (ref 4.5–5.9)
SARS-COV-2 RNA, RT PCR: NOT DETECTED
SEG NEUTROPHILS: 75 % (ref 36–66)
SEGMENTED NEUTROPHILS ABSOLUTE COUNT: 5.2 K/UL (ref 1.3–9.1)
SODIUM BLD-SCNC: 138 MMOL/L (ref 135–144)
SOURCE: NORMAL
SPECIMEN DESCRIPTION: NORMAL
TEXT FOR RESPIRATORY: ABNORMAL
TOTAL PROTEIN: 6.6 G/DL (ref 6.4–8.3)
TROPONIN, HIGH SENSITIVITY: 8 NG/L (ref 0–22)
TROPONIN, HIGH SENSITIVITY: 9 NG/L (ref 0–22)
WBC # BLD: 7 K/UL (ref 3.5–11)

## 2022-06-22 PROCEDURE — 3023F SPIROM DOC REV: CPT | Performed by: INTERNAL MEDICINE

## 2022-06-22 PROCEDURE — 85025 COMPLETE CBC W/AUTO DIFF WBC: CPT

## 2022-06-22 PROCEDURE — 99215 OFFICE O/P EST HI 40 MIN: CPT | Performed by: INTERNAL MEDICINE

## 2022-06-22 PROCEDURE — 80053 COMPREHEN METABOLIC PANEL: CPT

## 2022-06-22 PROCEDURE — 3017F COLORECTAL CA SCREEN DOC REV: CPT | Performed by: INTERNAL MEDICINE

## 2022-06-22 PROCEDURE — 99407 BEHAV CHNG SMOKING > 10 MIN: CPT | Performed by: INTERNAL MEDICINE

## 2022-06-22 PROCEDURE — 2580000003 HC RX 258: Performed by: EMERGENCY MEDICINE

## 2022-06-22 PROCEDURE — 2580000003 HC RX 258: Performed by: INTERNAL MEDICINE

## 2022-06-22 PROCEDURE — 71260 CT THORAX DX C+: CPT

## 2022-06-22 PROCEDURE — 82805 BLOOD GASES W/O2 SATURATION: CPT

## 2022-06-22 PROCEDURE — G8420 CALC BMI NORM PARAMETERS: HCPCS | Performed by: INTERNAL MEDICINE

## 2022-06-22 PROCEDURE — 6360000002 HC RX W HCPCS

## 2022-06-22 PROCEDURE — 2060000000 HC ICU INTERMEDIATE R&B

## 2022-06-22 PROCEDURE — 84145 PROCALCITONIN (PCT): CPT

## 2022-06-22 PROCEDURE — 6370000000 HC RX 637 (ALT 250 FOR IP): Performed by: EMERGENCY MEDICINE

## 2022-06-22 PROCEDURE — 6360000002 HC RX W HCPCS: Performed by: EMERGENCY MEDICINE

## 2022-06-22 PROCEDURE — 94761 N-INVAS EAR/PLS OXIMETRY MLT: CPT

## 2022-06-22 PROCEDURE — 6360000002 HC RX W HCPCS: Performed by: INTERNAL MEDICINE

## 2022-06-22 PROCEDURE — 87636 SARSCOV2 & INF A&B AMP PRB: CPT

## 2022-06-22 PROCEDURE — 6370000000 HC RX 637 (ALT 250 FOR IP)

## 2022-06-22 PROCEDURE — 4004F PT TOBACCO SCREEN RCVD TLK: CPT | Performed by: INTERNAL MEDICINE

## 2022-06-22 PROCEDURE — 82800 BLOOD PH: CPT

## 2022-06-22 PROCEDURE — 99285 EMERGENCY DEPT VISIT HI MDM: CPT

## 2022-06-22 PROCEDURE — 2700000000 HC OXYGEN THERAPY PER DAY

## 2022-06-22 PROCEDURE — 74177 CT ABD & PELVIS W/CONTRAST: CPT

## 2022-06-22 PROCEDURE — 93005 ELECTROCARDIOGRAM TRACING: CPT | Performed by: EMERGENCY MEDICINE

## 2022-06-22 PROCEDURE — G8427 DOCREV CUR MEDS BY ELIG CLIN: HCPCS | Performed by: INTERNAL MEDICINE

## 2022-06-22 PROCEDURE — 94640 AIRWAY INHALATION TREATMENT: CPT

## 2022-06-22 PROCEDURE — 6360000004 HC RX CONTRAST MEDICATION: Performed by: EMERGENCY MEDICINE

## 2022-06-22 PROCEDURE — 94664 DEMO&/EVAL PT USE INHALER: CPT

## 2022-06-22 PROCEDURE — 36415 COLL VENOUS BLD VENIPUNCTURE: CPT

## 2022-06-22 PROCEDURE — 84484 ASSAY OF TROPONIN QUANT: CPT

## 2022-06-22 RX ORDER — AZITHROMYCIN 250 MG/1
250 TABLET, FILM COATED ORAL DAILY
Status: DISCONTINUED | OUTPATIENT
Start: 2022-06-23 | End: 2022-06-24 | Stop reason: HOSPADM

## 2022-06-22 RX ORDER — METHYLPREDNISOLONE SODIUM SUCCINATE 40 MG/ML
40 INJECTION, POWDER, LYOPHILIZED, FOR SOLUTION INTRAMUSCULAR; INTRAVENOUS EVERY 6 HOURS
Status: DISCONTINUED | OUTPATIENT
Start: 2022-06-22 | End: 2022-06-23

## 2022-06-22 RX ORDER — ALBUTEROL SULFATE 2.5 MG/3ML
2.5 SOLUTION RESPIRATORY (INHALATION) 4 TIMES DAILY
Status: DISCONTINUED | OUTPATIENT
Start: 2022-06-22 | End: 2022-06-22

## 2022-06-22 RX ORDER — TRAZODONE HYDROCHLORIDE 50 MG/1
50 TABLET ORAL NIGHTLY
Qty: 30 TABLET | Refills: 2 | Status: SHIPPED | OUTPATIENT
Start: 2022-06-22

## 2022-06-22 RX ORDER — BUDESONIDE AND FORMOTEROL FUMARATE DIHYDRATE 160; 4.5 UG/1; UG/1
2 AEROSOL RESPIRATORY (INHALATION) DAILY
Status: DISCONTINUED | OUTPATIENT
Start: 2022-06-22 | End: 2022-06-22

## 2022-06-22 RX ORDER — IPRATROPIUM BROMIDE AND ALBUTEROL SULFATE 2.5; .5 MG/3ML; MG/3ML
1 SOLUTION RESPIRATORY (INHALATION)
Status: DISCONTINUED | OUTPATIENT
Start: 2022-06-22 | End: 2022-06-24 | Stop reason: HOSPADM

## 2022-06-22 RX ORDER — SODIUM CHLORIDE 0.9 % (FLUSH) 0.9 %
10 SYRINGE (ML) INJECTION AS NEEDED
Status: DISCONTINUED | OUTPATIENT
Start: 2022-06-22 | End: 2022-06-24 | Stop reason: HOSPADM

## 2022-06-22 RX ORDER — ACETAMINOPHEN 325 MG/1
650 TABLET ORAL EVERY 6 HOURS PRN
Status: DISCONTINUED | OUTPATIENT
Start: 2022-06-22 | End: 2022-06-24 | Stop reason: HOSPADM

## 2022-06-22 RX ORDER — DOXYCYCLINE 100 MG/1
100 CAPSULE ORAL ONCE
Status: COMPLETED | OUTPATIENT
Start: 2022-06-22 | End: 2022-06-22

## 2022-06-22 RX ORDER — ONDANSETRON 4 MG/1
4 TABLET, ORALLY DISINTEGRATING ORAL EVERY 8 HOURS PRN
Status: DISCONTINUED | OUTPATIENT
Start: 2022-06-22 | End: 2022-06-24 | Stop reason: HOSPADM

## 2022-06-22 RX ORDER — GAUZE BANDAGE 2" X 2"
100 BANDAGE TOPICAL DAILY
Status: DISCONTINUED | OUTPATIENT
Start: 2022-06-23 | End: 2022-06-24 | Stop reason: HOSPADM

## 2022-06-22 RX ORDER — SODIUM CHLORIDE 0.9 % (FLUSH) 0.9 %
5-40 SYRINGE (ML) INJECTION EVERY 12 HOURS SCHEDULED
Status: DISCONTINUED | OUTPATIENT
Start: 2022-06-22 | End: 2022-06-24 | Stop reason: HOSPADM

## 2022-06-22 RX ORDER — SODIUM CHLORIDE 9 MG/ML
25 INJECTION, SOLUTION INTRAVENOUS PRN
Status: DISCONTINUED | OUTPATIENT
Start: 2022-06-22 | End: 2022-06-24 | Stop reason: HOSPADM

## 2022-06-22 RX ORDER — SODIUM CHLORIDE 0.9 % (FLUSH) 0.9 %
5-40 SYRINGE (ML) INJECTION PRN
Status: DISCONTINUED | OUTPATIENT
Start: 2022-06-22 | End: 2022-06-24 | Stop reason: HOSPADM

## 2022-06-22 RX ORDER — ESCITALOPRAM OXALATE 10 MG/1
10 TABLET ORAL DAILY
Status: DISCONTINUED | OUTPATIENT
Start: 2022-06-23 | End: 2022-06-24 | Stop reason: HOSPADM

## 2022-06-22 RX ORDER — AZITHROMYCIN 250 MG/1
250 TABLET, FILM COATED ORAL DAILY
Status: DISCONTINUED | OUTPATIENT
Start: 2022-06-22 | End: 2022-06-22

## 2022-06-22 RX ORDER — METHYLPREDNISOLONE SODIUM SUCCINATE 125 MG/2ML
125 INJECTION, POWDER, LYOPHILIZED, FOR SOLUTION INTRAMUSCULAR; INTRAVENOUS ONCE
Status: COMPLETED | OUTPATIENT
Start: 2022-06-22 | End: 2022-06-22

## 2022-06-22 RX ORDER — BUDESONIDE AND FORMOTEROL FUMARATE DIHYDRATE 160; 4.5 UG/1; UG/1
2 AEROSOL RESPIRATORY (INHALATION) DAILY
Qty: 10.2 G | Refills: 2 | Status: SHIPPED | OUTPATIENT
Start: 2022-06-22

## 2022-06-22 RX ORDER — ONDANSETRON 2 MG/ML
4 INJECTION INTRAMUSCULAR; INTRAVENOUS EVERY 6 HOURS PRN
Status: DISCONTINUED | OUTPATIENT
Start: 2022-06-22 | End: 2022-06-24 | Stop reason: HOSPADM

## 2022-06-22 RX ORDER — AMLODIPINE BESYLATE 10 MG/1
10 TABLET ORAL DAILY
Status: DISCONTINUED | OUTPATIENT
Start: 2022-06-23 | End: 2022-06-24 | Stop reason: HOSPADM

## 2022-06-22 RX ORDER — BUDESONIDE AND FORMOTEROL FUMARATE DIHYDRATE 160; 4.5 UG/1; UG/1
2 AEROSOL RESPIRATORY (INHALATION) DAILY
Status: DISCONTINUED | OUTPATIENT
Start: 2022-06-22 | End: 2022-06-24 | Stop reason: HOSPADM

## 2022-06-22 RX ORDER — TIOTROPIUM BROMIDE 18 UG/1
18 CAPSULE ORAL; RESPIRATORY (INHALATION) DAILY
Qty: 30 CAPSULE | Refills: 2 | Status: SHIPPED | OUTPATIENT
Start: 2022-06-22

## 2022-06-22 RX ORDER — FOLIC ACID 1 MG/1
1 TABLET ORAL DAILY
Qty: 30 TABLET | Refills: 2 | Status: SHIPPED | OUTPATIENT
Start: 2022-06-22

## 2022-06-22 RX ORDER — LANOLIN ALCOHOL/MO/W.PET/CERES
100 CREAM (GRAM) TOPICAL DAILY
Qty: 30 TABLET | Refills: 2 | Status: SHIPPED | OUTPATIENT
Start: 2022-06-22

## 2022-06-22 RX ORDER — ENOXAPARIN SODIUM 100 MG/ML
40 INJECTION SUBCUTANEOUS DAILY
Status: DISCONTINUED | OUTPATIENT
Start: 2022-06-22 | End: 2022-06-24 | Stop reason: HOSPADM

## 2022-06-22 RX ORDER — TRAZODONE HYDROCHLORIDE 50 MG/1
50 TABLET ORAL NIGHTLY
Status: DISCONTINUED | OUTPATIENT
Start: 2022-06-22 | End: 2022-06-24 | Stop reason: HOSPADM

## 2022-06-22 RX ORDER — AZITHROMYCIN 250 MG/1
500 TABLET, FILM COATED ORAL ONCE
Status: COMPLETED | OUTPATIENT
Start: 2022-06-22 | End: 2022-06-22

## 2022-06-22 RX ORDER — ERGOCALCIFEROL 1.25 MG/1
50000 CAPSULE ORAL WEEKLY
Qty: 12 CAPSULE | Refills: 1 | Status: SHIPPED | OUTPATIENT
Start: 2022-06-22

## 2022-06-22 RX ORDER — POLYETHYLENE GLYCOL 3350 17 G/17G
17 POWDER, FOR SOLUTION ORAL DAILY PRN
Status: DISCONTINUED | OUTPATIENT
Start: 2022-06-22 | End: 2022-06-24 | Stop reason: HOSPADM

## 2022-06-22 RX ORDER — ACETAMINOPHEN 650 MG/1
650 SUPPOSITORY RECTAL EVERY 6 HOURS PRN
Status: DISCONTINUED | OUTPATIENT
Start: 2022-06-22 | End: 2022-06-24 | Stop reason: HOSPADM

## 2022-06-22 RX ORDER — 0.9 % SODIUM CHLORIDE 0.9 %
80 INTRAVENOUS SOLUTION INTRAVENOUS ONCE
Status: COMPLETED | OUTPATIENT
Start: 2022-06-22 | End: 2022-06-22

## 2022-06-22 RX ORDER — FOLIC ACID 1 MG/1
1 TABLET ORAL DAILY
Status: DISCONTINUED | OUTPATIENT
Start: 2022-06-22 | End: 2022-06-24 | Stop reason: HOSPADM

## 2022-06-22 RX ORDER — IPRATROPIUM BROMIDE AND ALBUTEROL SULFATE 2.5; .5 MG/3ML; MG/3ML
1 SOLUTION RESPIRATORY (INHALATION) ONCE
Status: COMPLETED | OUTPATIENT
Start: 2022-06-22 | End: 2022-06-22

## 2022-06-22 RX ORDER — ESCITALOPRAM OXALATE 10 MG/1
10 TABLET ORAL DAILY
Qty: 30 TABLET | Refills: 2 | Status: SHIPPED | OUTPATIENT
Start: 2022-06-22

## 2022-06-22 RX ADMIN — DOXYCYCLINE 100 MG: 100 CAPSULE ORAL at 15:51

## 2022-06-22 RX ADMIN — IPRATROPIUM BROMIDE AND ALBUTEROL SULFATE 1 AMPULE: .5; 2.5 SOLUTION RESPIRATORY (INHALATION) at 18:59

## 2022-06-22 RX ADMIN — AZITHROMYCIN 500 MG: 250 TABLET, FILM COATED ORAL at 18:16

## 2022-06-22 RX ADMIN — ENOXAPARIN SODIUM 40 MG: 100 INJECTION SUBCUTANEOUS at 18:16

## 2022-06-22 RX ADMIN — METHYLPREDNISOLONE SODIUM SUCCINATE 40 MG: 40 INJECTION, POWDER, FOR SOLUTION INTRAMUSCULAR; INTRAVENOUS at 18:16

## 2022-06-22 RX ADMIN — IPRATROPIUM BROMIDE AND ALBUTEROL SULFATE 1 AMPULE: .5; 2.5 SOLUTION RESPIRATORY (INHALATION) at 15:59

## 2022-06-22 RX ADMIN — SODIUM CHLORIDE 80 ML: 9 INJECTION, SOLUTION INTRAVENOUS at 16:59

## 2022-06-22 RX ADMIN — IOPAMIDOL 100 ML: 755 INJECTION, SOLUTION INTRAVENOUS at 16:59

## 2022-06-22 RX ADMIN — SODIUM CHLORIDE, PRESERVATIVE FREE 10 ML: 5 INJECTION INTRAVENOUS at 20:05

## 2022-06-22 RX ADMIN — SODIUM CHLORIDE, PRESERVATIVE FREE 10 ML: 5 INJECTION INTRAVENOUS at 16:59

## 2022-06-22 RX ADMIN — METHYLPREDNISOLONE SODIUM SUCCINATE 125 MG: 125 INJECTION, POWDER, FOR SOLUTION INTRAMUSCULAR; INTRAVENOUS at 15:51

## 2022-06-22 RX ADMIN — TRAZODONE HYDROCHLORIDE 50 MG: 50 TABLET ORAL at 22:03

## 2022-06-22 RX ADMIN — METHYLPREDNISOLONE SODIUM SUCCINATE 40 MG: 40 INJECTION, POWDER, FOR SOLUTION INTRAMUSCULAR; INTRAVENOUS at 22:03

## 2022-06-22 ASSESSMENT — ENCOUNTER SYMPTOMS
ABDOMINAL PAIN: 0
NAUSEA: 0
COUGH: 1
BACK PAIN: 0
VOMITING: 0
DIARRHEA: 0
CONSTIPATION: 0
SHORTNESS OF BREATH: 1
WHEEZING: 1

## 2022-06-22 ASSESSMENT — PAIN SCALES - GENERAL: PAINLEVEL_OUTOF10: 0

## 2022-06-22 NOTE — PROGRESS NOTES
[address] [facility] [address]  [place] [address]  Dept: [phone]  Dept Fax: [phone]     Name: Kenton Shone  : 1965           Chief Complaint   Patient presents with    COPD    Depression    Mass     renal mass recommended MRI       History of Present Illness:    HPI  Patient here for follow-up,  Feeling very short of breath,  Usually on 3 L of oxygen at home,  Current smoker,  Needing 5 L and still saturating 86%,  Immediately called the ER and patient transferred to the ER for admission,  Has a left renal exophytic mass, needs dedicated CT for the left kidney or MRI, call the ED doc and requested him to get a CT of the abdomen pelvis to check out kidney mass  Past Medical History:    No past medical history on file. Reviewed all health maintenance requirements and ordered appropriate tests  Health Maintenance Due   Topic Date Due    COVID-19 Vaccine (1) Never done    Pneumococcal 0-64 years Vaccine (1 - PCV) Never done    HIV screen  Never done    Hepatitis C screen  Never done    DTaP/Tdap/Td vaccine (1 - Tdap) Never done    Prostate Specific Antigen (PSA) Screening or Monitoring  Never done    Colorectal Cancer Screen  Never done    Shingles vaccine (1 of 2) Never done       Past Surgical History:    No past surgical history on file.      Medications:      Current Outpatient Medications:     escitalopram (LEXAPRO) 10 MG tablet, Take 1 tablet by mouth daily, Disp: 30 tablet, Rfl: 2    folic acid (FOLVITE) 1 MG tablet, Take 1 tablet by mouth daily, Disp: 30 tablet, Rfl: 2    mometasone (ASMANEX, 30 METERED DOSES,) 220 MCG/INH inhaler, Inhale 1 puff into the lungs daily, Disp: 1 each, Rfl: 3    SYMBICORT 160-4.5 MCG/ACT AERO, Inhale 2 puffs into the lungs daily, Disp: 10.2 g, Rfl: 2    thiamine 100 MG tablet, Take 1 tablet by mouth daily, Disp: 30 tablet, Rfl: 2    tiotropium (SPIRIVA HANDIHALER) 18 MCG inhalation capsule, Inhale 1 capsule into the lungs daily, Disp: 30 capsule, Rfl: 2    traZODone (DESYREL) 50 MG tablet, Take 1 tablet by mouth nightly, Disp: 30 tablet, Rfl: 2    vitamin D (ERGOCALCIFEROL) 1.25 MG (50725 UT) CAPS capsule, Take 1 capsule by mouth once a week, Disp: 12 capsule, Rfl: 1    albuterol (PROVENTIL) (2.5 MG/3ML) 0.083% nebulizer solution, Take 3 mLs by nebulization 4 times daily, Disp: 120 each, Rfl: 0    amLODIPine (NORVASC) 10 MG tablet, Take 1 tablet by mouth daily, Disp: 30 tablet, Rfl: 5    sodium polystyrene (SPS) 15 GM/60ML suspension, Take 60 mLs by mouth 2 times daily for 4 doses, Disp: 240 mL, Rfl: 0    Allergies:      Patient has no known allergies. Social History:    Tobacco:    reports that he has been smoking. He has a 43.00 pack-year smoking history. He has never used smokeless tobacco.  Alcohol:      reports previous alcohol use. Drug Use:  reports previous drug use. Family History:    No family history on file.     Review of Systems:    Positive and Negative as described in HPI    Constitutional:  negative for  fevers, chills, sweats, fatigue, and weight loss  HEENT:  negative for vision or hearing changes,   Respiratory:  negative for shortness of breath, cough, or congestion  Cardiovascular:  negative for  chest pain, palpitations  Gastrointestinal:  negative for nausea, vomiting, diarrhea, constipation, abdominal pain  Genitourinary:  negative for frequency, dysuria  Integument/Breast:  negative for rash, skin lesions  Musculoskeletal:  negative for muscle aches or joint pain  Neurological:  negative for headaches, dizziness, lightheadedness, numbness, pain and tingling extrimities  Behavior/Psych:  negative for depression and anxiety      Physical Exam:    Vitals:  /82   Pulse 99   Ht 5' 7\" (1.702 m)   Wt 140 lb 6.4 oz (63.7 kg)   SpO2 (!) 86%   BMI 21.99 kg/m²     General appearance -looking uncomfortable and in mild to moderate distress  Mental status - oriented to person, place, and time with normal affect  Head - normocephalic and atraumatic  Eyes - pupils equal and reactive, extraocular eye movements intact, conjunctiva clear  Ears - hearing appears to be intact  Nose - no drainage noted  Mouth - mucous membranes moist  Neck - supple, no carotid bruits, thyroid not palpable  Chest -bilateral wheezing, prolonged expiratory phase  Heart - normal rate, regular rhythm, no murmurs  Abdomen - soft, nontender, nondistended, bowel sounds present all four quadrants, no masses, hepatomegaly or splenomegaly  Neurological - normal speech, no focal findings or movement disorder noted, cranial nerves II through XII grossly intact  Extremities - peripheral pulses palpable, no pedal edema or calf pain with palpation  Skin - no gross lesions, rashes, or induration noted      Data:    Lab Results   Component Value Date     06/07/2022    K 4.1 06/07/2022    CL 96 06/07/2022    CO2 32 06/07/2022    BUN 7 06/07/2022    CREATININE 0.68 06/07/2022    GLUCOSE 78 06/07/2022    PROT 7.2 06/07/2022    LABALBU 4.3 06/07/2022    BILITOT <0.15 06/07/2022    ALKPHOS 92 06/07/2022    AST 15 06/07/2022    ALT 10 06/07/2022     Lab Results   Component Value Date    WBC 5.2 06/07/2022    RBC 4.28 06/07/2022    HGB 13.0 06/07/2022    HCT 39.5 06/07/2022    MCV 92.2 06/07/2022    MCH 30.4 06/07/2022    MCHC 32.9 06/07/2022    RDW 13.8 06/07/2022     06/07/2022    MPV 6.3 06/07/2022     Lab Results   Component Value Date    TSH 0.55 01/28/2022     Lab Results   Component Value Date    CHOL 183 01/28/2022    HDL 59 01/28/2022    LABA1C 5.0 01/28/2022          Assessment & Plan:     Diagnosis Orders   1. Acute on chronic respiratory failure with hypoxia (HCC)     2. Abuse of smoked substance ([facility]     3. Chronic respiratory failure with hypoxia (HCC)     4. Chronic obstructive pulmonary disease, unspecified COPD type ([facility]     5. Recurrent major depressive disorder, in partial remission ([facility]     6.  Alcoholism ([facility] 7. Benign essential HTN     8. Vitamin D deficiency     9. Renal mass, left  CT KIDNEY W WO CONTRAST       1. Acute on chronic respiratory failure with hypoxia (HCC)  2. Abuse of smoked substance ([facility]  3. Chronic respiratory failure with hypoxia (HCC)  4. Chronic obstructive pulmonary disease, unspecified COPD type ([facility]  5. Recurrent major depressive disorder, in partial remission ([facility]  6. Alcoholism ([facility]  7. Benign essential HTN  8. Vitamin D deficiency  9. Renal mass, left  -     CT KIDNEY W WO CONTRAST; Future     Acute on chronic liver failure,  Patient needing 5 L of oxygen since saturating 86%,  Immediately called ER for possible admission,  Breathing treatments,    Left kidney mass  Will get a CT       Smoking   Pt still smokes >10 pack yr   Strongly advised to quit,  pt will try /help offered  Counseling done for >10 mins             Completed Refills   Requested Prescriptions     Signed Prescriptions Disp Refills    escitalopram (LEXAPRO) 10 MG tablet 30 tablet 2     Sig: Take 1 tablet by mouth daily    folic acid (FOLVITE) 1 MG tablet 30 tablet 2     Sig: Take 1 tablet by mouth daily    mometasone (ASMANEX, 30 METERED DOSES,) 220 MCG/INH inhaler 1 each 3     Sig: Inhale 1 puff into the lungs daily    SYMBICORT 160-4.5 MCG/ACT AERO 10.2 g 2     Sig: Inhale 2 puffs into the lungs daily    thiamine 100 MG tablet 30 tablet 2     Sig: Take 1 tablet by mouth daily    tiotropium (SPIRIVA HANDIHALER) 18 MCG inhalation capsule 30 capsule 2     Sig: Inhale 1 capsule into the lungs daily    traZODone (DESYREL) 50 MG tablet 30 tablet 2     Sig: Take 1 tablet by mouth nightly    vitamin D (ERGOCALCIFEROL) 1.25 MG (11195 UT) CAPS capsule 12 capsule 1     Sig: Take 1 capsule by mouth once a week     No follow-ups on file.   Orders Placed This Encounter   Medications    escitalopram (LEXAPRO) 10 MG tablet     Sig: Take 1 tablet by mouth daily     Dispense:  30 tablet     Refill:  2    folic acid (FOLVITE) 1 MG tablet     Sig: Take 1 tablet by mouth daily     Dispense:  30 tablet     Refill:  2    mometasone (ASMANEX, 30 METERED DOSES,) 220 MCG/INH inhaler     Sig: Inhale 1 puff into the lungs daily     Dispense:  1 each     Refill:  3    SYMBICORT 160-4.5 MCG/ACT AERO     Sig: Inhale 2 puffs into the lungs daily     Dispense:  10.2 g     Refill:  2    thiamine 100 MG tablet     Sig: Take 1 tablet by mouth daily     Dispense:  30 tablet     Refill:  2    tiotropium (SPIRIVA HANDIHALER) 18 MCG inhalation capsule     Sig: Inhale 1 capsule into the lungs daily     Dispense:  30 capsule     Refill:  2    traZODone (DESYREL) 50 MG tablet     Sig: Take 1 tablet by mouth nightly     Dispense:  30 tablet     Refill:  2    vitamin D (ERGOCALCIFEROL) 1.25 MG (52379 UT) CAPS capsule     Sig: Take 1 capsule by mouth once a week     Dispense:  12 capsule     Refill:  1     Orders Placed This Encounter   Procedures    CT KIDNEY W WO CONTRAST     Standing Status:   Future     Standing Expiration Date:   6/22/2023     Order Specific Question:   STAT Creatinine as needed:     Answer:   No       Electronically signed by Cruz Cuellar MD on 6/22/2022 at 2:52 PM

## 2022-06-22 NOTE — Clinical Note
Discharge Plan[de-identified] Home with Office Follow-up   Telemetry/Cardiac Monitoring Required?: Yes   Bed request comments: progressive

## 2022-06-22 NOTE — ED PROVIDER NOTES
[facility]      Pt Name: Anjelica Lopez  MRN: [id number]  [place] 1965  Date of evaluation: 6/22/22      CHIEF COMPLAINT       Chief Complaint   Patient presents with    Shortness of Breath     HISTORY OF PRESENT ILLNESS   HPI 61 y.o. male presents with c/o redness of breath wheezing and cough. Patient reports that he has had progressively difficulty breathing since he left the hospital later this month. The patient was seen in the emergency department on 7 June and treated for COPD exacerbation. Since that time he has had progressively worsening shortness of breath wheezing and cough. Cough is productive of yellow purulent sputum. Seen in his primary care provider's office today. Patient on a baseline 3 L of oxygen. Patient was found to be hypoxic in the 80% range on this oxygen level, put on 5 L of oxygen and remained in the 80s. Patient was treated with and nebulizer treatment and sent to the emergency department. Jj Nelson REVIEW OF SYSTEMS       Review of Systems   Constitutional: Positive for fatigue. Negative for fever. HENT: Positive for congestion. Eyes: Negative for visual disturbance. Respiratory: Positive for cough, shortness of breath and wheezing. Cardiovascular: Negative for chest pain. Gastrointestinal: Negative for abdominal pain. Genitourinary: Negative for difficulty urinating. Musculoskeletal: Negative for back pain. Neurological: Negative for headaches. Hematological: Bruises/bleeds easily. PAST MEDICAL HISTORY   No past medical history on file. SURGICAL HISTORY     No past surgical history on file.     CURRENT MEDICATIONS       Current Discharge Medication List      CONTINUE these medications which have NOT CHANGED    Details   escitalopram (LEXAPRO) 10 MG tablet Take 1 tablet by mouth daily  Qty: 30 tablet, Refills: 2      folic acid (FOLVITE) 1 MG tablet Take 1 tablet by mouth daily  Qty: 30 tablet, Refills: 2 SYMBICORT 160-4.5 MCG/ACT AERO Inhale 2 puffs into the lungs daily  Qty: 10.2 g, Refills: 2      thiamine 100 MG tablet Take 1 tablet by mouth daily  Qty: 30 tablet, Refills: 2      tiotropium (SPIRIVA HANDIHALER) 18 MCG inhalation capsule Inhale 1 capsule into the lungs daily  Qty: 30 capsule, Refills: 2      traZODone (DESYREL) 50 MG tablet Take 1 tablet by mouth nightly  Qty: 30 tablet, Refills: 2      vitamin D (ERGOCALCIFEROL) 1.25 MG (96238 UT) CAPS capsule Take 1 capsule by mouth once a week  Qty: 12 capsule, Refills: 1      albuterol (PROVENTIL) (2.5 MG/3ML) 0.083% nebulizer solution Take 3 mLs by nebulization 4 times daily  Qty: 120 each, Refills: 0      amLODIPine (NORVASC) 10 MG tablet Take 1 tablet by mouth daily  Qty: 30 tablet, Refills: 5    Comments: plz cancel lisinopril /hctz for this patient             ALLERGIES     has No Known Allergies. FAMILY HISTORY     has no family status information on file. SOCIAL HISTORY      reports that he has been smoking. He has a 43.00 pack-year smoking history. He has never used smokeless tobacco. He reports previous alcohol use. He reports previous drug use.     PHYSICAL EXAM     INITIAL VITALS: /83   Pulse 79   Temp 98 °F (36.7 °C) (Oral)   Resp 20   Ht 5' 7\" (1.702 m)   Wt 140 lb (63.5 kg)   SpO2 95%   BMI 21.93 kg/m²   Gen: NAD  Head: Normocephalic, atraumatic  Eye: Pupils equal round reactive to light, no conjunctivitis  ENT: MMM  Neck: No JVD  Heart: Regular rate and rhythm no murmurs  Lungs: Poor air movement bilaterally, expiratory wheezing, no respiratory distress  Chest wall: No crepitus, no tenderness palpation  Abdomen: Soft, nontender, nondistended, with no peritoneal signs  Neurologic: Patient is alert and oriented x3, motor and sensation is intact in all 4 extremities, fluent speech  Extremities: No edema, no calf tenderness to palpation    MEDICAL DECISION MAKING:     [facility] and Emergency Department course  61 y.o. male presenting with clinical signs of a COPD exacerbation. Patient was ruled out for a COVID infection and influenza infection. Patient was recently found to have mass in his kidney. CT scans of his chest abdomen and pelvis were ordered and showed to further evaluate hypoxia and also better characterize renal mass recently seen. Patient was treated with steroids, nebs, antibiotics. Case was discussed with the internal medicine service and the patient is admitted to the hospital.  Patient and family in agreement with the treatment plan. DIAGNOSTIC RESULTS     EKG: All EKG's are interpreted by the Emergency Department Physician who either signs or Co-signs this chart in the absence of a cardiologist.     EKG shows a sinus rhythm. HR is 87, , QRS 88, , no STEFANO, No STD, No TWI, the axis is normal.        RADIOLOGY:All plain film, CT, MRI, and formal ultrasound images (except ED bedside ultrasound) are read by the radiologist and the images and interpretations are directly viewed by the emergency physician. CT CHEST PULMONARY EMBOLISM W CONTRAST    (Results Pending)   CT ABDOMEN PELVIS W IV CONTRAST Additional Contrast? None    (Results Pending)   XR CHEST (SINGLE VIEW FRONTAL)    (Results Pending)       LABS: All lab results were reviewed by myself, and all abnormals are listed below.   Labs Reviewed   CBC WITH AUTO DIFFERENTIAL - Abnormal; Notable for the following components:       Result Value    RBC 4.24 (*)     Hemoglobin 12.5 (*)     Hematocrit 39.2 (*)     Seg Neutrophils 75 (*)     Lymphocytes 16 (*)     All other components within normal limits   COMPREHENSIVE METABOLIC PANEL - Abnormal; Notable for the following components:    Glucose 132 (*)     CREATININE 0.58 (*)     Chloride 97 (*)     CO2 33 (*)     Anion Gap 8 (*)     Total Bilirubin 0.19 (*)     All other components within normal limits   BLOOD GAS, VENOUS - Abnormal; Notable for the following components:    pCO2, Nacho 63.0 (*) HCO3, Venous 36.5 (*)     Positive Base Excess, Nacho 11.3 (*)     All other components within normal limits   COVID-19 & INFLUENZA COMBO   TROPONIN   PROCALCITONIN   TROPONIN   BASIC METABOLIC PANEL W/ REFLEX TO MG FOR LOW K   CBC       EMERGENCY DEPARTMENT COURSE:   Vitals:    Vitals:    06/22/22 1559 06/22/22 1630 06/22/22 1715 06/22/22 1730   BP:  129/84 119/83    Pulse:  93 79    Resp:   20    Temp:   98 °F (36.7 °C)    TempSrc:   Oral    SpO2: 97% 96% 95%    Weight:    140 lb (63.5 kg)   Height:    5' 7\" (1.702 m)       The patient was given the following medications while in the emergency department:  Orders Placed This Encounter   Medications    ipratropium-albuterol (DUONEB) nebulizer solution 1 ampule     Order Specific Question:   Initiate RT Bronchodilator Protocol     Answer: Yes    methylPREDNISolone sodium (SOLU-MEDROL) injection 125 mg    doxycycline monohydrate (MONODOX) capsule 100 mg     Order Specific Question:   Antimicrobial Indications     Answer:   Pneumonia (CAP)    sodium chloride flush 0.9 % injection 5-40 mL    sodium chloride flush 0.9 % injection 5-40 mL    0.9 % sodium chloride infusion    OR Linked Order Group     ondansetron (ZOFRAN-ODT) disintegrating tablet 4 mg     ondansetron (ZOFRAN) injection 4 mg    polyethylene glycol (GLYCOLAX) packet 17 g    OR Linked Order Group     acetaminophen (TYLENOL) tablet 650 mg     acetaminophen (TYLENOL) suppository 650 mg    enoxaparin (LOVENOX) injection 40 mg     Order Specific Question:   Indication of Use     Answer:   Prophylaxis-DVT/PE    0.9 % sodium chloride bolus    iopamidol (ISOVUE-370) 76 % injection 100 mL    sodium chloride flush 0.9 % injection 10 mL    DISCONTD: albuterol (PROVENTIL) nebulizer solution 2.5 mg     Order Specific Question:   Initiate RT Bronchodilator Protocol     Answer:    Yes    amLODIPine (NORVASC) tablet 10 mg    escitalopram (LEXAPRO) tablet 10 mg    folic acid (FOLVITE) tablet 1 mg    DISCONTD: budesonide-formoterol (SYMBICORT) 160-4.5 MCG/ACT inhaler 2 puff    thiamine mononitrate tablet 100 mg    DISCONTD: tiotropium (SPIRIVA RESPIMAT) 2.5 MCG/ACT inhaler 2 puff    traZODone (DESYREL) tablet 50 mg    methylPREDNISolone sodium (SOLU-MEDROL) injection 40 mg    DISCONTD: azithromycin (ZITHROMAX) tablet 250 mg     Order Specific Question:   Antimicrobial Indications     Answer:   COPD Exacerbation     Order Specific Question:   COPD exacerbation duration of therapy     Answer:   5 days    ipratropium-albuterol (DUONEB) nebulizer solution 1 ampule     Order Specific Question:   Initiate RT Bronchodilator Protocol     Answer: Yes    DISCONTD: tiotropium (SPIRIVA RESPIMAT) 2.5 MCG/ACT inhaler 2 puff    azithromycin (ZITHROMAX) tablet 500 mg     Order Specific Question:   Antimicrobial Indications     Answer:   COPD Exacerbation     Order Specific Question:   COPD exacerbation duration of therapy     Answer:   5 days    azithromycin (ZITHROMAX) tablet 250 mg     Order Specific Question:   Antimicrobial Indications     Answer:   COPD Exacerbation     Order Specific Question:   COPD exacerbation duration of therapy     Answer:   5 days    budesonide-formoterol (SYMBICORT) 160-4.5 MCG/ACT inhaler 2 puff     -------------------------  CRITICAL CARE:   CONSULTS: IP CONSULT TO INTERNAL MEDICINE  PROCEDURES: Procedures     FINAL IMPRESSION      1. Acute on chronic respiratory failure with hypoxia (HCC)    2. COPD exacerbation ([facility]          DISPOSITION/PLAN   DISPOSITION Admitted 06/22/2022 03:47:51 PM      PATIENT REFERRED TO:  No follow-up provider specified.     DISCHARGE MEDICATIONS:  Current Discharge Medication List            Barrett Lao MD  Attending Emergency Physician                      Barrett Lao MD  06/22/22 3993

## 2022-06-22 NOTE — PROGRESS NOTES
Medication History completed:    New medications: none    Medications discontinued: SPS, asmanex inhaler    Changes to dosing:   Ergocalciferol is taken on Saturdays    Stated allergies: NKDA    Other pertinent information: Medications confirmed with Faiza W Issa Peng.      Thank you,  Aziza Duffy, PharmD, BCPS  [phone]

## 2022-06-22 NOTE — PROGRESS NOTES
Patient admitted to PCU room 2117. No distress noted. Connected to heart monitor and 5L/NC of oxygen. Vitals as charted. Oriented to room and call light.

## 2022-06-22 NOTE — PROGRESS NOTES
BRONCHOSPASM/BRONCHOCONSTRICTION     [x]         IMPROVE AERATION/BREATH SOUNDS  [x]   ADMINISTER BRONCHODILATOR THERAPY AS APPROPRIATE  [x]   ASSESS BREATH SOUNDS  []   IMPLEMENT AEROSOL/MDI PROTOCOL  [x]   PATIENT EDUCATION AS NEEDED    PATIENT REFUSES TO WEAR BIPAP     [x] Risks and benefits explained to patient   [x] Patient refuses to wear Bipap stating \"I'm not wearing that. \"  [x] Patient verbalizes understanding of information presented.

## 2022-06-22 NOTE — H&P
[address]     HISTORY AND PHYSICAL EXAMINATION            Date:   6/22/2022  Patient name:  Sandra Redman  Date of admission:  6/22/2022  2:54 PM  MRN:   [id number]  Account:  [de-identified]  YOB: 1965  PCP:    True Mejia MD  Room:   03/03  Code Status:    Full Code    Chief Complaint:     Chief Complaint   Patient presents with    Shortness of Breath       History Obtained From:     patient    History of Present Illness:     Patient is a 60 yo M patient with history of COPD, hypertension, depression, presented to the ED with a 3-day history of dyspnea, increased cough and increased sputum production above baseline. Patient went to his PCP today for hospital follow-up secondary to COPD exacerbation on June 7. Patient went to the ED June 7 for COPD exacerbation. ,  Was discharged on doxycycline for 5 days and prednisone 50 mg also for 5 days. Patient at the office today was saturating at 86% on 5 L. Patient was then transferred to the ER from the office. Patient has a chronic smoking history, states he smoked 3 packs of cigarettes per day for 30 years, now he is down to 1 pack. Patient had CT of the lung screen which was negative for nodules, but did show a 3 cm left renal mass. In the ED patient was on 5 L nasal cannula saturating at 95%. Vitals were within normal limits. Patient was given a one-time dose of 125 mg IV Solu-Medrol, and doxycycline. CT scanning of the lungs and abdomen was also done. Pro-Nikolai was unremarkable, troponin also unremarkable. COVID and flu negative. VBG showed compensated respiratory acidosis. Past Medical History:     No past medical history on file. Past SurgicalHistory:     No past surgical history on file.      Medications Prior to Admission:        Prior to Admission medications Medication Sig Start Date End Date Taking? Authorizing Provider   escitalopram (LEXAPRO) 10 MG tablet Take 1 tablet by mouth daily 22   Lanette Soares MD   folic acid (FOLVITE) 1 MG tablet Take 1 tablet by mouth daily 22   Lanette Soares MD   SYMBICORT 160-4.5 MCG/ACT AERO Inhale 2 puffs into the lungs daily 22   Lanette Soares MD   thiamine 100 MG tablet Take 1 tablet by mouth daily 22   Lanette Soares MD   tiotropium (Xenia Josiah) 18 MCG inhalation capsule Inhale 1 capsule into the lungs daily 22   Lanette Soares MD   traZODone (DESYREL) 50 MG tablet Take 1 tablet by mouth nightly 22   Lanette Soares MD   vitamin D (ERGOCALCIFEROL) 1.25 MG (06001 UT) CAPS capsule Take 1 capsule by mouth once a week 22   Lanette Soares MD   albuterol (PROVENTIL) (2.5 MG/3ML) 0.083% nebulizer solution Take 3 mLs by nebulization 4 times daily 22   Jada Anderson DO   amLODIPine (NORVASC) 10 MG tablet Take 1 tablet by mouth daily 3/25/22   Lanette Soares MD        Allergies:     Patient has no known allergies. Social History:     Tobacco:    reports that he has been smoking. He has a 43.00 pack-year smoking history. He has never used smokeless tobacco.  Alcohol:      reports previous alcohol use. Drug Use:  reports previous drug use. Family History:     No family history on file. Review of Systems:     Positive and Negative as described in HPI. Review of Systems   Constitutional: Negative for chills and fever. Respiratory: Positive for cough and shortness of breath. Cardiovascular: Negative for chest pain. Gastrointestinal: Negative for abdominal pain, constipation, diarrhea, nausea and vomiting. Genitourinary: Negative for dysuria. Neurological: Negative for dizziness and headaches.        Physical Exam:   /79   Pulse 99   Temp 98.6 °F (37 °C) (Oral)   Resp 20   SpO2 97%   Temp (24hrs), Av.6 °F (37 °C), Min:98.6 °F (37 °C), Max:98.6 °F (37 °C)    No results for input(s): POCGLU in the last 72 hours. No intake or output data in the 24 hours ending 06/22/22 1635    Physical Exam  HENT:      Head: Normocephalic and atraumatic. Cardiovascular:      Rate and Rhythm: Normal rate and regular rhythm. Pulses: Normal pulses. Heart sounds: Normal heart sounds. No murmur heard. No friction rub. No gallop. Pulmonary:      Effort: Pulmonary effort is normal. No respiratory distress. Breath sounds: No stridor. Wheezing present. No rales. Abdominal:      General: Abdomen is flat. There is no distension. Palpations: Abdomen is soft. There is no mass. Tenderness: There is no abdominal tenderness. There is no guarding or rebound. Neurological:      Mental Status: He is alert.          Investigations:     Laboratory Testing:  Recent Results (from the past 24 hour(s))   CBC with Auto Differential    Collection Time: 06/22/22  3:43 PM   Result Value Ref Range    WBC 7.0 3.5 - 11.0 k/uL    RBC 4.24 (L) 4.5 - 5.9 m/uL    Hemoglobin 12.5 (L) 13.5 - 17.5 g/dL    Hematocrit 39.2 (L) 41 - 53 %    MCV 92.5 80 - 100 fL    MCH 29.6 26 - 34 pg    MCHC 32.0 31 - 37 g/dL    RDW 13.7 11.5 - 14.9 %    Platelets 939 108 - 385 k/uL    MPV 6.8 6.0 - 12.0 fL    Seg Neutrophils 75 (H) 36 - 66 %    Lymphocytes 16 (L) 24 - 44 %    Monocytes 7 1 - 7 %    Eosinophils % 1 0 - 4 %    Basophils 1 0 - 2 %    Segs Absolute 5.20 1.3 - 9.1 k/uL    Absolute Lymph # 1.10 1.0 - 4.8 k/uL    Absolute Mono # 0.50 0.1 - 1.3 k/uL    Absolute Eos # 0.10 0.0 - 0.4 k/uL    Basophils Absolute 0.00 0.0 - 0.2 k/uL   Comprehensive Metabolic Panel    Collection Time: 06/22/22  3:43 PM   Result Value Ref Range    Glucose 132 (H) 70 - 99 mg/dL    BUN 13 6 - 20 mg/dL    CREATININE 0.58 (L) 0.70 - 1.20 mg/dL    Calcium 9.3 8.6 - 10.4 mg/dL    Sodium 138 135 - 144 mmol/L    Potassium 4.1 3.7 - 5.3 mmol/L    Chloride 97 (L) 98 - 107 mmol/L    CO2 33 (H) 20 - 31 mmol/L    Anion Gap 8 (L) 9 - 17 mmol/L    Alkaline Phosphatase 79 40 - 129 U/L    ALT 8 5 - 41 U/L    AST 10 <40 U/L    Total Bilirubin 0.19 (L) 0.3 - 1.2 mg/dL    Total Protein 6.6 6.4 - 8.3 g/dL    Albumin 4.0 3.5 - 5.2 g/dL    GFR Non-African American >60 >60 mL/min    GFR African American >60 >60 mL/min    GFR Comment         Troponin    Collection Time: 06/22/22  3:43 PM   Result Value Ref Range    Troponin, High Sensitivity 9 0 - 22 ng/L   Blood Gas, Venous    Collection Time: 06/22/22  3:44 PM   Result Value Ref Range    pH, Nacho 7.371 7.320 - 7.420    pCO2, Nacho 63.0 (H) 39.0 - 55.0    pO2, Nacho 45.8 30.0 - 50.0    HCO3, Venous 36.5 (H) 24.0 - 30.0 mmol/L    Positive Base Excess, Nacho 11.3 (H) 0.0 - 2.0 mmol/L    O2 Sat, Nacho 77.8 60.0 - 85.0 %    Carboxyhemoglobin 4.3 0 - 5 %    Methemoglobin 0.9 0.0 - 1.9 %    Text for Respiratory VBG    COVID-19 & Influenza Combo    Collection Time: 06/22/22  3:44 PM    Specimen: Nasopharyngeal Swab   Result Value Ref Range    Specimen Description . NASOPHARYNGEAL SWAB     Source . NASOPHARYNGEAL SWAB     SARS-CoV-2 RNA, RT PCR Not Detected Not Detected    INFLUENZA A Not Detected Not Detected    INFLUENZA B Not Detected Not Detected       Imaging/Diagnostics:  XR CHEST PORTABLE    Result Date: 6/7/2022  EXAMINATION: ONE XRAY VIEW OF THE CHEST 6/7/2022 7:34 pm COMPARISON: None. HISTORY: ORDERING SYSTEM PROVIDED HISTORY: CP, SOB TECHNOLOGIST PROVIDED HISTORY: CP, SOB Reason for Exam: PT CO CP with SOB X several days. FINDINGS: AP portable view of the chest time stamped at 1920 hours demonstrates normal cardiac size. Aortic arch is calcified. Granulomatous changes are present in the benjamin areas. Emphysematous changes are present in the lungs. No focal consolidation, effusion or pneumothorax is noted. Osseous structures are age-appropriate. Emphysematous changes in the lungs without acute abnormality.        Assessment :      Primary Problem  COPD exacerbation [facility])    Active Hospital Problems Diagnosis Date Noted    COPD exacerbation ([facility] [J44.1] 06/22/2022     Priority: Medium    Acute respiratory failure (HCC) [J96.00] 06/22/2022     Priority: Medium       Plan:     Patient status Admit as inpatient in the  Progressive Unit/Step down    COPD exacerbation   -Increased dyspnea, sputum production, cough for 3 days  -On 5 L oxygen at home  -Given 125 mg IV Solu-Medrol in the ED  -Given 100 mg doxycycline in the ED  -In the ED patient was saturating at 96% on 5 L  -Start IV Solu-Medrol 40 mg every 6 hours on the floor  -Zithromax 500 mg for 1 dose, then 250 mg daily  -DuoNeb every 4 hours  -Respiratory care eval and treat  -Resumed home Symbicort and Spiriva  -CT PE pending  -VBG in the ED showed compensated respiratory acidosis    - pH of 7.371, PCO2 63, bicarb 36.5    History of renal mass  -CT lung screen on 04/21/2022 showed a 3 cm left renal mass  -CT abdomen pelvis ordered    Depression  -Resumed home Lexapro    Hypertension  -Blood pressure in the /83  -Resumed home Norvasc            Consultations:   IP CONSULT TO INTERNAL MEDICINE     Patient is admitted as inpatient status because of co-morbiditieslisted above, severity of signs and symptoms as outlined, requirement for current medical therapies and most importantly because of direct risk to patient if care not provided in a hospital setting.     Ting Burton MD  6/22/2022  4:35 PM    Copy sent to Dr. Néstor Zamudio MD

## 2022-06-22 NOTE — PROGRESS NOTES
JOSE Searsatient Assessment complete. Acute respiratory failure (HCC) [J96.00]  COPD exacerbation ([facility] [address] [address] [J44.1]  Acute on chronic respiratory failure with hypoxia ([facility] [J96.21] . Vitals:    06/22/22 1859   BP:    Pulse:    Resp:    Temp:    SpO2: 95%   . Patients home meds are   Prior to Admission medications    Medication Sig Start Date End Date Taking? Authorizing Provider   escitalopram (LEXAPRO) 10 MG tablet Take 1 tablet by mouth daily 6/22/22   Ely Mcqueen MD   folic acid (FOLVITE) 1 MG tablet Take 1 tablet by mouth daily 6/22/22   Ely Mcqueen MD   SYMBICORT 160-4.5 MCG/ACT AERO Inhale 2 puffs into the lungs daily 6/22/22   Ely Mcqueen MD   thiamine 100 MG tablet Take 1 tablet by mouth daily 6/22/22   Ely Mcqueen MD   tiotropium (Manford Passy) 18 MCG inhalation capsule Inhale 1 capsule into the lungs daily 6/22/22   Ely Mcqueen MD   traZODone (DESYREL) 50 MG tablet Take 1 tablet by mouth nightly 6/22/22   Ely Mcqueen MD   vitamin D (ERGOCALCIFEROL) 1.25 MG (82867 UT) CAPS capsule Take 1 capsule by mouth once a week 6/22/22   Ely Mcqueen MD   albuterol (PROVENTIL) (2.5 MG/3ML) 0.083% nebulizer solution Take 3 mLs by nebulization 4 times daily 6/7/22   Win Olivares DO   amLODIPine (NORVASC) 10 MG tablet Take 1 tablet by mouth daily 3/25/22   Ely Mcqueen MD       Assessment:     06/22/22 1905   RT Protocol   History Pulmonary Disease 2   Respiratory pattern 2   Breath sounds 2   Cough 1   Indications for Bronchodilator Therapy Decreased or absent breath sounds; Wheezing associated with pulm disorder   Bronchodilator Assessment Score 7         HR - 84  RR - 20  SpO2 - 95% (4L NC)  Breath Sounds: Diminished; end expiratory wheezes; crackles in bases      Bronchodilator assessment at level  3  Hyperinflation assessment at level 1  Secretion Management assessment at level  1    [x]    Bronchodilator Assessment  BRONCHODILATOR ASSESSMENT SCORE  Score 0 1 2 3 4 5 Breath Sounds   []  Patient Baseline []  No Wheeze good aeration []  Faint, scattered wheezing, good aeration [x]  Expiratory Wheezing and or moderately diminished []  Insp/Exp wheeze and/or very diminished []  Insp/Exp and/ or marked distress   Respiratory Rate   []  Patient Baseline []  Less than 20 []  Less than 20 [x]  20-25 []  Greater than 25 []  Greater than 25   Peak flow % of Pred or PB [x]  NA   []  Greater than 90%  []  81-90% []  71-80% []  Less than or equal to 70%  or unable to perform []  Unable due to Respiratory Distress   Dyspnea re []  Patient Baseline []  No SOB []  No SOB [x]  SOB on exertion []  SOB min activity []  At rest/acute   e FEV% Predicted       [x]  NA []  Above 69%  []  Unable []  Above 60-69%  []  Unable []  Above 50-59%  []  Unable []  Above 35-49%  []  Unable []  Less than 35%  []  Unable                 [x]  Hyperinflation Assessment  Score 1 2 3   CXR and Breath Sounds   []  Clear []  No atelectasis  Basilar aeration []  Atelectasis or absent basilar breath sounds   Incentive Spirometry Volume  (Per IBW)   []  Greater than or equal to 15ml/Kg []  less than 15ml/Kg []  less than 15ml/Kg   Surgery within last 2 weeks [x]  None or general   []  Abdominal or thoracic surgery  []  Abdominal or thoracic   Chronic Pulmonary Historyre []  No [x]  Yes []  Yes     [x]  Secretion Management Assessment  Score 1 2 3   Bilateral Breath Sounds   []  Occasional Rhonchi []  Scattered Rhonchi []  Course Rhonchi and/or poor aeration   Sputum    []  Small amount of thin secretions []  Moderate amount of viscous secretions []  Copius, Viscious Yellow/ Secretions   CXR as reported by physician []  clear  [x]  Unavailable []  Infiltrates and/or consolidation  [x]  Unavailable []  Mucus Plugging and or lobar consolidation  []  Unavailable   Cough [x]  Strong, productive cough []  Weak productive cough []  No cough or weak non-productive cough   Elana Coy RCP  7:06 PM

## 2022-06-22 NOTE — PROGRESS NOTES
06/22/22 1559   Treatment   Treatment Type HHN   $Treatment Type $Inhaled Therapy/Meds   Medications Albuterol/Ipratropium   Pre-Tx Pulse 96   Pre-Tx Resps 20   Breath Sounds Pre-Tx LONDON Diminished   Breath Sounds Pre-Tx LLL Diminished   Breath Sounds Pre-Tx RUL Diminished   Breath Sounds Pre-Tx RML Diminished   Breath Sounds Pre-Tx RLL Diminished   Breath Sounds Post-Tx LONDON Diminished   Breath Sounds Post-Tx LLL Diminished   Breath Sounds Post-Tx RUL Diminished   Breath Sounds Post-Tx RML Diminished   Breath Sounds Post-Tx RLL Diminished   Post-Tx Pulse 92   Post-Tx Resps 20   Delivery Source Oxygen;Mouthpiece   Position Semi-Chavez's   Treatment Tolerance Well   Is patient on O2? Y   Oxygen Therapy/Pulse Ox   O2 Therapy Oxygen   $Oxygen $Daily Charge   O2 Device Nasal cannula   O2 Flow Rate (L/min) 4 L/min   SpO2 97 %     Duoneb given. NAD noted. VBG results show compensated chronic respiratory failure with bicarb retention.

## 2022-06-22 NOTE — ED NOTES
Report given to Jaylen Gonsalez RN from U. Report method by phone   The following was reviewed with receiving RN:   Current vital signs:  /84   Pulse 93   Temp 98.6 °F (37 °C) (Oral)   Resp 20   SpO2 96%                      Any medication or safety alerts were reviewed. Any pending diagnostics and notifications were also reviewed, as well as any safety concerns or issues, abnormal labs, abnormal imaging, and abnormal assessment findings. Questions were answered.           Alfonso Smith RN  06/22/22 9707

## 2022-06-23 ENCOUNTER — TELEPHONE (OUTPATIENT)
Dept: UROLOGY | Age: 57
End: 2022-06-23

## 2022-06-23 LAB
ANION GAP SERPL CALCULATED.3IONS-SCNC: 6 MMOL/L (ref 9–17)
BUN BLDV-MCNC: 14 MG/DL (ref 6–20)
CALCIUM SERPL-MCNC: 8.8 MG/DL (ref 8.6–10.4)
CHLORIDE BLD-SCNC: 98 MMOL/L (ref 98–107)
CO2: 33 MMOL/L (ref 20–31)
CREAT SERPL-MCNC: 0.62 MG/DL (ref 0.7–1.2)
EKG ATRIAL RATE: 87 BPM
EKG P-R INTERVAL: 140 MS
EKG Q-T INTERVAL: 332 MS
EKG QRS DURATION: 88 MS
EKG QTC CALCULATION (BAZETT): 399 MS
EKG R AXIS: 143 DEGREES
EKG T AXIS: 146 DEGREES
EKG VENTRICULAR RATE: 87 BPM
GFR AFRICAN AMERICAN: >60 ML/MIN
GFR NON-AFRICAN AMERICAN: >60 ML/MIN
GFR SERPL CREATININE-BSD FRML MDRD: ABNORMAL ML/MIN/{1.73_M2}
GLUCOSE BLD-MCNC: 156 MG/DL (ref 75–110)
GLUCOSE BLD-MCNC: 162 MG/DL (ref 70–99)
HCT VFR BLD CALC: 37.5 % (ref 41–53)
HEMOGLOBIN: 12.1 G/DL (ref 13.5–17.5)
MCH RBC QN AUTO: 29.9 PG (ref 26–34)
MCHC RBC AUTO-ENTMCNC: 32.2 G/DL (ref 31–37)
MCV RBC AUTO: 92.9 FL (ref 80–100)
PDW BLD-RTO: 13.4 % (ref 11.5–14.9)
PLATELET # BLD: 251 K/UL (ref 150–450)
PMV BLD AUTO: 6.8 FL (ref 6–12)
POTASSIUM SERPL-SCNC: 4 MMOL/L (ref 3.7–5.3)
POTASSIUM SERPL-SCNC: 5.4 MMOL/L (ref 3.7–5.3)
RBC # BLD: 4.04 M/UL (ref 4.5–5.9)
REASON FOR REJECTION: NORMAL
SODIUM BLD-SCNC: 137 MMOL/L (ref 135–144)
WBC # BLD: 4.1 K/UL (ref 3.5–11)
ZZ NTE CLEAN UP: ORDERED TEST: NORMAL
ZZ NTE WITH NAME CLEAN UP: SPECIMEN SOURCE: NORMAL

## 2022-06-23 PROCEDURE — 6360000002 HC RX W HCPCS: Performed by: INTERNAL MEDICINE

## 2022-06-23 PROCEDURE — 6360000002 HC RX W HCPCS: Performed by: STUDENT IN AN ORGANIZED HEALTH CARE EDUCATION/TRAINING PROGRAM

## 2022-06-23 PROCEDURE — 94761 N-INVAS EAR/PLS OXIMETRY MLT: CPT

## 2022-06-23 PROCEDURE — 99223 1ST HOSP IP/OBS HIGH 75: CPT | Performed by: INTERNAL MEDICINE

## 2022-06-23 PROCEDURE — 97162 PT EVAL MOD COMPLEX 30 MIN: CPT

## 2022-06-23 PROCEDURE — 6370000000 HC RX 637 (ALT 250 FOR IP)

## 2022-06-23 PROCEDURE — 2060000000 HC ICU INTERMEDIATE R&B

## 2022-06-23 PROCEDURE — 36415 COLL VENOUS BLD VENIPUNCTURE: CPT

## 2022-06-23 PROCEDURE — 97166 OT EVAL MOD COMPLEX 45 MIN: CPT

## 2022-06-23 PROCEDURE — 2500000003 HC RX 250 WO HCPCS

## 2022-06-23 PROCEDURE — 2580000003 HC RX 258: Performed by: INTERNAL MEDICINE

## 2022-06-23 PROCEDURE — 2700000000 HC OXYGEN THERAPY PER DAY

## 2022-06-23 PROCEDURE — 84132 ASSAY OF SERUM POTASSIUM: CPT

## 2022-06-23 PROCEDURE — 94664 DEMO&/EVAL PT USE INHALER: CPT

## 2022-06-23 PROCEDURE — 6360000002 HC RX W HCPCS

## 2022-06-23 PROCEDURE — 82947 ASSAY GLUCOSE BLOOD QUANT: CPT

## 2022-06-23 PROCEDURE — 85027 COMPLETE CBC AUTOMATED: CPT

## 2022-06-23 PROCEDURE — 80048 BASIC METABOLIC PNL TOTAL CA: CPT

## 2022-06-23 PROCEDURE — 94640 AIRWAY INHALATION TREATMENT: CPT

## 2022-06-23 PROCEDURE — G0103 PSA SCREENING: HCPCS

## 2022-06-23 RX ORDER — METHYLPREDNISOLONE SODIUM SUCCINATE 40 MG/ML
40 INJECTION, POWDER, LYOPHILIZED, FOR SOLUTION INTRAMUSCULAR; INTRAVENOUS EVERY 8 HOURS
Status: DISCONTINUED | OUTPATIENT
Start: 2022-06-23 | End: 2022-06-24 | Stop reason: HOSPADM

## 2022-06-23 RX ORDER — DEXTROSE MONOHYDRATE 25 G/50ML
25 INJECTION, SOLUTION INTRAVENOUS ONCE
Status: COMPLETED | OUTPATIENT
Start: 2022-06-23 | End: 2022-06-23

## 2022-06-23 RX ORDER — SIMETHICONE 80 MG
80 TABLET,CHEWABLE ORAL EVERY 6 HOURS PRN
Status: DISCONTINUED | OUTPATIENT
Start: 2022-06-23 | End: 2022-06-23

## 2022-06-23 RX ORDER — DEXTROSE MONOHYDRATE 50 MG/ML
100 INJECTION, SOLUTION INTRAVENOUS PRN
Status: DISCONTINUED | OUTPATIENT
Start: 2022-06-23 | End: 2022-06-24 | Stop reason: HOSPADM

## 2022-06-23 RX ADMIN — TRAZODONE HYDROCHLORIDE 50 MG: 50 TABLET ORAL at 21:12

## 2022-06-23 RX ADMIN — AMLODIPINE BESYLATE 10 MG: 10 TABLET ORAL at 08:03

## 2022-06-23 RX ADMIN — METHYLPREDNISOLONE SODIUM SUCCINATE 40 MG: 40 INJECTION, POWDER, FOR SOLUTION INTRAMUSCULAR; INTRAVENOUS at 05:08

## 2022-06-23 RX ADMIN — SODIUM CHLORIDE, PRESERVATIVE FREE 10 ML: 5 INJECTION INTRAVENOUS at 21:12

## 2022-06-23 RX ADMIN — AZITHROMYCIN MONOHYDRATE 250 MG: 250 TABLET ORAL at 08:03

## 2022-06-23 RX ADMIN — THIAMINE HCL TAB 100 MG 100 MG: 100 TAB at 08:03

## 2022-06-23 RX ADMIN — ESCITALOPRAM OXALATE 10 MG: 10 TABLET ORAL at 08:03

## 2022-06-23 RX ADMIN — METHYLPREDNISOLONE SODIUM SUCCINATE 40 MG: 40 INJECTION, POWDER, LYOPHILIZED, FOR SOLUTION INTRAMUSCULAR; INTRAVENOUS at 21:11

## 2022-06-23 RX ADMIN — IPRATROPIUM BROMIDE AND ALBUTEROL SULFATE 1 AMPULE: .5; 2.5 SOLUTION RESPIRATORY (INHALATION) at 19:03

## 2022-06-23 RX ADMIN — FOLIC ACID 1 MG: 1 TABLET ORAL at 08:03

## 2022-06-23 RX ADMIN — SODIUM CHLORIDE, PRESERVATIVE FREE 10 ML: 5 INJECTION INTRAVENOUS at 08:07

## 2022-06-23 RX ADMIN — IPRATROPIUM BROMIDE AND ALBUTEROL SULFATE 1 AMPULE: .5; 2.5 SOLUTION RESPIRATORY (INHALATION) at 11:32

## 2022-06-23 RX ADMIN — Medication 25 G: at 07:59

## 2022-06-23 RX ADMIN — BUDESONIDE AND FORMOTEROL FUMARATE DIHYDRATE 2 PUFF: 160; 4.5 AEROSOL RESPIRATORY (INHALATION) at 07:23

## 2022-06-23 RX ADMIN — ENOXAPARIN SODIUM 40 MG: 100 INJECTION SUBCUTANEOUS at 08:06

## 2022-06-23 RX ADMIN — IPRATROPIUM BROMIDE AND ALBUTEROL SULFATE 1 AMPULE: .5; 2.5 SOLUTION RESPIRATORY (INHALATION) at 07:23

## 2022-06-23 RX ADMIN — INSULIN HUMAN 10 UNITS: 100 INJECTION, SOLUTION PARENTERAL at 07:59

## 2022-06-23 RX ADMIN — METHYLPREDNISOLONE SODIUM SUCCINATE 40 MG: 40 INJECTION, POWDER, FOR SOLUTION INTRAMUSCULAR; INTRAVENOUS at 11:19

## 2022-06-23 RX ADMIN — IPRATROPIUM BROMIDE AND ALBUTEROL SULFATE 1 AMPULE: .5; 2.5 SOLUTION RESPIRATORY (INHALATION) at 15:09

## 2022-06-23 ASSESSMENT — ENCOUNTER SYMPTOMS
NAUSEA: 0
ABDOMINAL PAIN: 0
COUGH: 0
CONSTIPATION: 0
VOMITING: 0
ABDOMINAL DISTENTION: 0
SHORTNESS OF BREATH: 1
DIARRHEA: 0

## 2022-06-23 NOTE — PROGRESS NOTES
[facility]    PROGRESS NOTE             6/23/2022    9:19 AM    Name:   Micheal Hermosillo  MRN:     [id number]     Acct:      [de-identified]   Room:   [facility] [place] [address] Day:  1  Admit Date:  6/22/2022  2:54 PM    PCP:  Estefania Kim MD  Code Status:  Full Code    Subjective:     C/C:   Chief Complaint   Patient presents with    Shortness of Breath     Interval History Status: not changed. Patient was seen and examined bedside. He is in no acute distress. He still complaining of shortness of breath, has a change from yesterday. Denies any chest pain, fever, chills, cough, bowel or habit changes. He is on 4 L nasal cannula      Review of Systems:     Review of Systems   Constitutional: Negative for fatigue and fever. Respiratory: Positive for shortness of breath. Negative for cough. Cardiovascular: Negative for chest pain, palpitations and leg swelling. Gastrointestinal: Negative for abdominal distention, abdominal pain, constipation, diarrhea, nausea and vomiting. Genitourinary: Negative for dysuria, flank pain, frequency and hematuria. Musculoskeletal: Negative. Neurological: Negative for dizziness, weakness, numbness and headaches. Medications:      Allergies:  No Known Allergies    Current Meds:   Scheduled Meds:    sodium chloride flush  5-40 mL IntraVENous 2 times per day    enoxaparin  40 mg SubCUTAneous Daily    amLODIPine  10 mg Oral Daily    escitalopram  10 mg Oral Daily    folic acid  1 mg Oral Daily    thiamine mononitrate  100 mg Oral Daily    traZODone  50 mg Oral Nightly    methylPREDNISolone  40 mg IntraVENous Q6H    ipratropium-albuterol  1 ampule Inhalation Q4H WA    azithromycin  250 mg Oral Daily    budesonide-formoterol  2 puff Inhalation Daily     Continuous Infusions:    dextrose      sodium chloride       PRN Meds: glucose, dextrose bolus **OR** dextrose bolus, glucagon (rDNA), radiation dose to as low as reasonably achievable.; CT of the abdomen and pelvis was performed with the administration of intravenous contrast. Multiplanar reformatted images are provided for review. Automated exposure control, iterative reconstruction, and/or weight based adjustment of the mA/kV was utilized to reduce the radiation dose to as low as reasonably achievable. COMPARISON: CT chest dated 04/21/2022. Chest x-ray dated 06/07/2022 HISTORY: ORDERING SYSTEM PROVIDED HISTORY: sob, hypoxia, tachycardia TECHNOLOGIST PROVIDED HISTORY: sob, hypoxia, tachycardia Decision Support Exception - unselect if not a suspected or confirmed emergency medical condition->Emergency Medical Condition (MA) Reason for Exam: sob, hypoxia, tachycardia FINDINGS: Pulmonary Arteries: Pulmonary arteries are adequately opacified for evaluation. No evidence of intraluminal filling defect to suggest pulmonary embolism. Main pulmonary arteries are mildly dilated. Mediastinum: No evidence of mediastinal lymphadenopathy. The heart and pericardium demonstrate no acute abnormality. There is no acute abnormality of the thoracic aorta. Lungs/pleura: Detailed assessment is mildly limited by respiratory motion. There is mild centrilobular emphysema. There is mild diffuse bronchial wall thickening. No focal airspace consolidation, pneumothorax, or pleural effusion. A couple of tiny 0.3 cm areas of nodularity are either unchanged from the previous study or obscured by respiratory motion on today's study. No new or enlarging pulmonary nodule. Abdominal organs: Tiny cyst noted in the central liver. Liver is otherwise unremarkable. Gallbladder, spleen, pancreas, and adrenal glands are unremarkable. There is an exophytic lesion at the anterior mid left kidney, measuring 3.7 x 3.3 cm, which is slightly more dense than expected for a simple cyst.  There is symmetric enhancement of the kidneys. No hydronephrosis or perinephric inflammation. GI/bowel: There is scattered colonic diverticulosis. There is no abnormal bowel distention or pericolonic inflammation. No free intraperitoneal air or fluid. Appendix is normal. Pelvis: Urinary bladder is within normal limits. No pelvic lymphadenopathy. Atherosclerosis noted in the iliac arteries. Peritoneum/retroperitoneum: The abdominal aorta is normal in caliber. There is no retroperitoneal or mesenteric lymphadenopathy. There is mild-to-moderate atherosclerosis throughout the abdominal aorta and major visceral arteries. Soft Tissues/Bones: No acute bone or soft tissue abnormality. 1.  No evidence of acute pulmonary embolism. 2.  No acute intrathoracic process. 3.  Mild centrilobular emphysema and diffuse bronchial wall thickening, suggesting bronchitis, likely chronic. 4.  No acute process in the abdomen or pelvis. 5.  Exophytic 3.7 cm lesion at the mid left kidney is slightly more dense than expected for a simple cyst, most likely a cyst with proteinaceous/hemorrhagic contents. When compared to an unenhanced CT chest dated 04/21/2022, there is no appreciable enhancement. XR CHEST PORTABLE    Result Date: 6/7/2022  EXAMINATION: ONE XRAY VIEW OF THE CHEST 6/7/2022 7:34 pm COMPARISON: None. HISTORY: ORDERING SYSTEM PROVIDED HISTORY: CP, SOB TECHNOLOGIST PROVIDED HISTORY: CP, SOB Reason for Exam: PT CO CP with SOB X several days. FINDINGS: AP portable view of the chest time stamped at 1920 hours demonstrates normal cardiac size. Aortic arch is calcified. Granulomatous changes are present in the benjamin areas. Emphysematous changes are present in the lungs. No focal consolidation, effusion or pneumothorax is noted. Osseous structures are age-appropriate. Emphysematous changes in the lungs without acute abnormality.      CT CHEST PULMONARY EMBOLISM W CONTRAST    Result Date: 6/22/2022  EXAMINATION: CTA OF THE CHEST; CT OF THE ABDOMEN AND PELVIS WITH CONTRAST 6/22/2022 4:51 pm TECHNIQUE: CTA of the chest was performed after the administration of intravenous contrast.  Multiplanar reformatted images are provided for review. MIP images are provided for review. Automated exposure control, iterative reconstruction, and/or weight based adjustment of the mA/kV was utilized to reduce the radiation dose to as low as reasonably achievable.; CT of the abdomen and pelvis was performed with the administration of intravenous contrast. Multiplanar reformatted images are provided for review. Automated exposure control, iterative reconstruction, and/or weight based adjustment of the mA/kV was utilized to reduce the radiation dose to as low as reasonably achievable. COMPARISON: CT chest dated 04/21/2022. Chest x-ray dated 06/07/2022 HISTORY: ORDERING SYSTEM PROVIDED HISTORY: sob, hypoxia, tachycardia TECHNOLOGIST PROVIDED HISTORY: sob, hypoxia, tachycardia Decision Support Exception - unselect if not a suspected or confirmed emergency medical condition->Emergency Medical Condition (MA) Reason for Exam: sob, hypoxia, tachycardia FINDINGS: Pulmonary Arteries: Pulmonary arteries are adequately opacified for evaluation. No evidence of intraluminal filling defect to suggest pulmonary embolism. Main pulmonary arteries are mildly dilated. Mediastinum: No evidence of mediastinal lymphadenopathy. The heart and pericardium demonstrate no acute abnormality. There is no acute abnormality of the thoracic aorta. Lungs/pleura: Detailed assessment is mildly limited by respiratory motion. There is mild centrilobular emphysema. There is mild diffuse bronchial wall thickening. No focal airspace consolidation, pneumothorax, or pleural effusion. A couple of tiny 0.3 cm areas of nodularity are either unchanged from the previous study or obscured by respiratory motion on today's study. No new or enlarging pulmonary nodule. Abdominal organs: Tiny cyst noted in the central liver. Liver is otherwise unremarkable.   Gallbladder, spleen, pancreas, and adrenal glands are unremarkable. There is an exophytic lesion at the anterior mid left kidney, measuring 3.7 x 3.3 cm, which is slightly more dense than expected for a simple cyst.  There is symmetric enhancement of the kidneys. No hydronephrosis or perinephric inflammation. GI/bowel: There is scattered colonic diverticulosis. There is no abnormal bowel distention or pericolonic inflammation. No free intraperitoneal air or fluid. Appendix is normal. Pelvis: Urinary bladder is within normal limits. No pelvic lymphadenopathy. Atherosclerosis noted in the iliac arteries. Peritoneum/retroperitoneum: The abdominal aorta is normal in caliber. There is no retroperitoneal or mesenteric lymphadenopathy. There is mild-to-moderate atherosclerosis throughout the abdominal aorta and major visceral arteries. Soft Tissues/Bones: No acute bone or soft tissue abnormality. 1.  No evidence of acute pulmonary embolism. 2.  No acute intrathoracic process. 3.  Mild centrilobular emphysema and diffuse bronchial wall thickening, suggesting bronchitis, likely chronic. 4.  No acute process in the abdomen or pelvis. 5.  Exophytic 3.7 cm lesion at the mid left kidney is slightly more dense than expected for a simple cyst, most likely a cyst with proteinaceous/hemorrhagic contents. When compared to an unenhanced CT chest dated 04/21/2022, there is no appreciable enhancement. Physical Examination:        Physical Exam  Constitutional:       General: He is not in acute distress. Appearance: Normal appearance. HENT:      Head: Normocephalic and atraumatic. Cardiovascular:      Rate and Rhythm: Normal rate and regular rhythm. Pulses: Normal pulses. Heart sounds: Normal heart sounds. No murmur heard. Pulmonary:      Effort: Pulmonary effort is normal.      Breath sounds: Wheezing present. No rhonchi or rales. Abdominal:      General: Abdomen is flat.  Bowel sounds are normal. There is no [facility])  Active Problems:    Renal mass, left    Acute respiratory failure (HCC)    Alcoholism ([facility]    Benign essential HTN  Resolved Problems:    * No resolved hospital problems. *        Overall  course ;                                   are improving over time.         Patient, clinically doing better  Reducing dose of Solu-Medrol  Has COPD, chronic hypoxic, hypercapnic respiratory failure  , On admission patient is CO2 retention but pH was okay  Has incidental finding of left renal mass, requesting urology consult  Patient unfortunately still continue to smoke  History of alcoholism          Electronically signed by Mery Mclaughlin MD

## 2022-06-23 NOTE — PROGRESS NOTES
Occupational Therapy  Facility/Department: [address] [place] [address]  Occupational Therapy Initial Assessment    Name: Gloria Bazzi  : 1965  MRN: [id number]  Date of Service: 2022    Discharge Recommendations:  Patient would benefit from continued therapy after discharge          Patient Diagnosis(es): The primary encounter diagnosis was Acute on chronic respiratory failure with hypoxia ([facility] [address] A diagnosis of COPD exacerbation ([facility] was also pertinent to this visit. Past Medical History:  has no past medical history on file. Past Surgical History:  has no past surgical history on file. Treatment Diagnosis: Impaired self-care status      Assessment   Performance deficits / Impairments: Decreased functional mobility ; Decreased ADL status; Decreased strength;Decreased endurance;Decreased balance;Decreased high-level IADLs;Decreased fine motor control;Decreased coordination  Treatment Diagnosis: Impaired self-care status  Prognosis: Good  Decision Making: Medium Complexity  REQUIRES OT FOLLOW-UP: Yes  Activity Tolerance  Activity Tolerance: Patient Tolerated treatment well;Patient limited by fatigue  Safety Devices  Safety Devices in place: Yes  Type of devices: All fall risk precautions in place;Call light within reach;Gait belt;Patient at risk for falls; Left in bed;Nurse notified        Plan   Plan  Times per Week: 4-6  Current Treatment Recommendations: Strengthening,Balance training,Functional mobility training,Endurance training,Safety education & training,Patient/Caregiver education & training,Equipment evaluation, education, & procurement,Self-Care / ADL,Home management training     Restrictions  Restrictions/Precautions  Restrictions/Precautions: General Precautions  Required Braces or Orthoses?: No  Implants present? :  (Pt denies)  Position Activity Restriction  Other position/activity restrictions: OT/PT eval and treat    Subjective   General  Chart Reviewed: Yes,Orders,Progress Notes,History and Physical,Imaging,Labs  Patient assessed for rehabilitation services?: Yes  Additional Pertinent Hx: Patient is a 62 yo M patient with history of COPD, hypertension, depression, presented to the ED with a 3-day history of dyspnea, increased cough and increased sputum production above baseline. Patient went to his PCP today for hospital follow-up secondary to COPD exacerbation on June 7. Patient went to the ED June 7 for COPD exacerbation. ,  Was discharged on doxycycline for 5 days and prednisone 50 mg also for 5 days. Patient at the office today was saturating at 86% on 5 L. Patient was then transferred to the ER from the office. Patient has a chronic smoking history, states he smoked 3 packs of cigarettes per day for 30 years, now he is down to 1 pack. Patient has a chronic smoking history, states he smoked 3 packs of cigarettes per day for 30 years, now he is down to 1 pack. Patient has a chronic smoking history, states he smoked 3 packs of cigarettes per day for 30 years, now he is down to 1 pack. Patient has a chronic smoking history, states he smoked 3 packs of cigarettes per day for 30 years, now he is down to 1 pack. Patient had CT of the lung screen which was negative for nodules, but did show a 3 cm left renal mass. In the ED patient was on 5 L nasal cannula saturating at 95%. Vitals were within normal limits. Patient was given a one-time dose of 125 mg IV Solu-Medrol, and doxycycline. CT scanning of the lungs and abdomen was also done. Pro-Nikolai was unremarkable, troponin also unremarkable. COVID and flu negative. VBG showed compensated respiratory acidosis.   Family / Caregiver Present: No  Referring Practitioner: Chris Min MD  Diagnosis: Acute respiratory failure; COPD exacerbation; Acute on chronic respiratory failure with hypoxia  Subjective  Subjective: \"I can't walk too far\"  General Comment  Comments: Okay for OT/PT evaluation per TRACEE Fernández     Social/Functional History  Social/Functional History  Lives With: Significant other,Family (Grandkids)  Type of Home: House  Home Layout: One level  Home Access: Ramped entrance  Bathroom Shower/Tub: Tub/Shower unit,Curtain,Shower chair with back  Bathroom Toilet: Standard  Bathroom Equipment: Grab bars in shower,Hand-held shower,Toilet raiser,Grab bars around toilet,Shower chair  Bathroom Accessibility: Accessible  Home Equipment: [address] (Originally on 3L, put up to 5L at dr appointment)  Has the patient had two or more falls in the past year or any fall with injury in the past year?: No  ADL Assistance: [address]: Independent  Homemaking Responsibilities: Yes  Ambulation Assistance: Independent  Transfer Assistance: Independent  Active : No  Patient's  Info: Sister-in-law   Mode of Transportation: SUV  Occupation: On disability  IADL Comments: Sleeps in recliner chair at home  Additional Comments: Reports S/O is there throughout the day - does not provide much help but is there if needed. Objective   Heart Rate: (!) 101 (101 while sitting, 126 while ambulating)  Heart Rate Source: Monitor  BP: 126/86  BP Location: Left upper arm  BP Method: Automatic  Patient Position: Semi fowlers  MAP (Calculated): 99.33  Resp: 16  SpO2: 96 %  O2 Device: Nasal cannula  Comment: patient was on 3L  Vision: Within Functional Limits  Hearing: Within functional limits    Orientation  Overall Orientation Status: Within Functional Limits  Observation/Palpation  Posture: Fair  Observation: Peripheral IV L antecubital           AROM: Within functional limits  Strength: Within functional limits  Coordination: Generally decreased, functional  Tone: Normal  Sensation: Intact  ADL  Feeding: Modified independent   Grooming: Supervision;Setup  UE Bathing: Supervision  LE Bathing: Stand by assistance; Increased time to complete  UE Dressing: Supervision  LE Dressing: Stand by assistance; Increased time to complete  Toileting: Stand by assistance; Increased time to complete  Additional Comments: ADL scores based on skilled observation and clinical reasoning unless otherwise noted. Pt limited by fatigue, decreased endurance, and decreased activity tolerance, impacting pt's ability to safely and independently complete self-care and mobility. Tone RUE  RUE Tone: Normotonic  Tone LUE  LUE Tone: Normotonic  Coordination  Movements Are Fluid And Coordinated: No  Coordination and Movement Description: Decreased speed;Decreased accuracy; Right UE;Left UE;Fine motor impairments  Activity Tolerance  Activity Tolerance: Patient limited by fatigue;Patient limited by endurance; Patient tolerated treatment well  Bed mobility  Supine to Sit: Independent  Sit to Supine: Independent  Scooting: Independent  Bed Mobility Comments: Good hand placement  Transfers  Sit to stand: Independent  Stand to sit:  Independent  Transfer Comments: Good hand placement     Cognition  Overall Cognitive Status: WFL  Perception  Overall Perceptual Status: WFL     Sensation  Overall Sensation Status: WFL            LUE AROM (degrees)  LUE AROM : WFL  Left Hand AROM (degrees)  Left Hand AROM: WFL  RUE AROM (degrees)  RUE AROM : WFL  Right Hand AROM (degrees)  Right Hand AROM: WFL  LUE Strength  Gross LUE Strength: WFL  L Hand General: 4/5  LUE Strength Comment: Grossly 4/5  RUE Strength  Gross RUE Strength: WFL  R Hand General: 4/5  RUE Strength Comment: Grossly 4/5    AM-PAC Score        AM-[facility] Inpatient Daily Activity Raw Score: 19 (06/23/22 1551)  AM-PAC Inpatient ADL T-Scale Score : 40.22 (06/23/22 1551)  ADL Inpatient CMS 0-100% Score: 42.8 (06/23/22 1551)  ADL Inpatient CMS G-Code Modifier : CK (06/23/22 1551)    Goals  Short Term Goals  Time Frame for Short term goals: By discharge  Short Term Goal 1: Pt will perform BADLs with Mod I, good safety, and use of AE as needed  Short Term Goal 2: Pt will perform functional mobility with Mod I, good safety, and use of least restrictive device  Short Term Goal 3: Pt will participate in 15-25 minutes of therapeutic exercise/functional activity to improve safety and independence with self-care and functional mobility  Short Term Goal 4: Pt will tolerate standing for 8+ minutes, Mod I, with 0-1 UE support and no LOB during functional activity to improve engagement in ADLs/IADLs  Short Term Goal 5: Pt will demonstrate understanding of EC/WS/fall prevention strategies and participate with exploration of possible AE/DME needs for safe return home  Short Term Goal 6: Pt will demonstrate proper breathing techniques to maintain SpO2 > 90% during ADL completion with Min cues or less   Patient Goals   Patient goals : Did not state       Therapy Time   Individual Concurrent Group Co-treatment   Time In 1414         Time Out 1435         Minutes 21         Timed Code Treatment Minutes: 10 Minutes       Vikram Del Castillo OTR/L

## 2022-06-23 NOTE — PROGRESS NOTES
JOSE Carmonaatient Assessment complete. Acute respiratory failure (HCC) [J96.00]  COPD exacerbation ([facility] [J44.1]  Acute on chronic respiratory failure with hypoxia ([facility] [J96.21] . Vitals:    06/23/22 1903   BP:    Pulse:    Resp:    Temp:    SpO2: 94%   . Patients home meds are   Prior to Admission medications    Medication Sig Start Date End Date Taking? Authorizing Provider   escitalopram (LEXAPRO) 10 MG tablet Take 1 tablet by mouth daily 6/22/22   Apolinar Mercer MD   folic acid (FOLVITE) 1 MG tablet Take 1 tablet by mouth daily 6/22/22   Apolinar Mercer MD   SYMBICORT 160-4.5 MCG/ACT AERO Inhale 2 puffs into the lungs daily 6/22/22   Apolinar Mercer MD   thiamine 100 MG tablet Take 1 tablet by mouth daily 6/22/22   Apolinar Mercer MD   tiotropium (Rockne Asters) 18 MCG inhalation capsule Inhale 1 capsule into the lungs daily 6/22/22   Apolinar Mercer MD   traZODone (DESYREL) 50 MG tablet Take 1 tablet by mouth nightly 6/22/22   Apolinar Mercer MD   vitamin D (ERGOCALCIFEROL) 1.25 MG (83359 UT) CAPS capsule Take 1 capsule by mouth once a week 6/22/22   Apolinar Mercer MD   albuterol (PROVENTIL) (2.5 MG/3ML) 0.083% nebulizer solution Take 3 mLs by nebulization 4 times daily 6/7/22   Kyra Cobia,    amLODIPine (NORVASC) 10 MG tablet Take 1 tablet by mouth daily 3/25/22   Apolinar Mercer MD       Assessment:     06/23/22 1906   RT Protocol   History Pulmonary Disease 2   Respiratory pattern 2   Breath sounds 4   Cough 1   Indications for Bronchodilator Therapy Decreased or absent breath sounds; Wheezing associated with pulm disorder; On home bronchodilators   Bronchodilator Assessment Score 9         HR - 93  RR - 18  SpO2 - 94% (3L NC)  Breath Sounds: Diminished      Bronchodilator assessment at level  3  Hyperinflation assessment at level 1  Secretion Management assessment at level  1    [x]    Bronchodilator Assessment  BRONCHODILATOR ASSESSMENT SCORE  Score 0 1 2 3 4 5   Breath Sounds []  Patient Baseline []  No Wheeze good aeration []  Faint, scattered wheezing, good aeration [x]  Expiratory Wheezing and or moderately diminished []  Insp/Exp wheeze and/or very diminished []  Insp/Exp and/ or marked distress   Respiratory Rate   []  Patient Baseline []  Less than 20 []  Less than 20 [x]  20-25 []  Greater than 25 []  Greater than 25   Peak flow % of Pred or PB [x]  NA   []  Greater than 90%  []  81-90% []  71-80% []  Less than or equal to 70%  or unable to perform []  Unable due to Respiratory Distress   Dyspnea re []  Patient Baseline []  No SOB []  No SOB [x]  SOB on exertion []  SOB min activity []  At rest/acute   e FEV% Predicted       [x]  NA []  Above 69%  []  Unable []  Above 60-69%  []  Unable []  Above 50-59%  []  Unable []  Above 35-49%  []  Unable []  Less than 35%  []  Unable                 [x]  Hyperinflation Assessment  Score 1 2 3   CXR and Breath Sounds   [x]  Clear []  No atelectasis  Basilar aeration []  Atelectasis or absent basilar breath sounds   Incentive Spirometry Volume  (Per IBW)   []  Greater than or equal to 15ml/Kg []  less than 15ml/Kg []  less than 15ml/Kg   Surgery within last 2 weeks [x]  None or general   []  Abdominal or thoracic surgery  []  Abdominal or thoracic   Chronic Pulmonary Historyre []  No [x]  Yes []  Yes     [x]  Secretion Management Assessment  Score 1 2 3   Bilateral Breath Sounds   []  Occasional Rhonchi []  Scattered Rhonchi []  Course Rhonchi and/or poor aeration   Sputum    []  Small amount of thin secretions []  Moderate amount of viscous secretions []  Copius, Viscious Yellow/ Secretions   CXR as reported by physician [x]  clear  []  Unavailable []  Infiltrates and/or consolidation  []  Unavailable []  Mucus Plugging and or lobar consolidation  []  Unavailable   Cough [x]  Strong, productive cough [x]  Weak productive cough []  No cough or weak non-productive cough   Vivian Turner RCP  7:06 PM

## 2022-06-23 NOTE — TELEPHONE ENCOUNTER
Ad Chery from [facility] called for consult for pt. Room: 2117  Phone: [phone]  Diagnosis: Kidney Mass  Referring Physicians: Bal Byrnes    Message sent to Dr. Yin Mayes on call.

## 2022-06-23 NOTE — FLOWSHEET NOTE
06/23/22 1230   Encounter Summary   Encounter Overview/Reason  Initial Encounter   Service Provided For: Patient   Referral/Consult From: Rounding   Last Encounter  06/23/22   Complexity of Encounter Moderate   Spiritual/Emotional needs   Type Spiritual Support   Assessment/Intervention/Outcome   Assessment Calm;Coping;Peaceful   Intervention Active listening;Prayer (assurance of)/[place];Sustaining Presence/Ministry of presence   Outcome Acceptance;Comfort;Coping;Encouraged;Engaged in conversation;Peace

## 2022-06-23 NOTE — PROGRESS NOTES
Physical Therapy  Facility/Department: [address] [place] [address]  Physical Therapy Initial Assessment    Name: Svetlana Malik  : 1965  MRN: [id number]  Date of Service: 2022    Discharge Recommendations:  No therapy recommended at discharge (Simultaneous filing. User may not have seen previous data.)          Patient Diagnosis(es): The primary encounter diagnosis was Acute on chronic respiratory failure with hypoxia ([address] A diagnosis of COPD exacerbation ([address] was also pertinent to this visit. Past Medical History:  has no past medical history on file. Past Surgical History:  has no past surgical history on file. Assessment   Assessment: patient is a 60 y/o male presenting to physical therapy upon a COPD exacerbation. Pt ambulated 80' SBA with O2, limited by endurance and fatigue. Patient will benefit from skilled PT at this time. Treatment Diagnosis: decreased endurance, fatigue  Therapy Prognosis: Good  Decision Making: Medium Complexity  Requires PT Follow-Up: Yes  Activity Tolerance  Activity Tolerance: Patient limited by fatigue;Patient limited by endurance; Patient tolerated treatment well     Plan   Plan  Plan: 2-3 times per week  Plan weeks: ? D/C  Safety Devices  Type of Devices: Gait belt,Left in bed,Call light within reach     Restrictions  Restrictions/Precautions  Restrictions/Precautions: General Precautions  Required Braces or Orthoses?: No  Implants present? :  (Pt denies)  Position Activity Restriction  Other position/activity restrictions: OT/PT eval and treat     Subjective   General  Chart Reviewed: Yes  Patient assessed for rehabilitation services?: Yes  Additional Pertinent Hx: Patient is a 60 yo male patient with history of COPD, hypertension, depression, presented to the ED with a 3-day history of dyspnea, increased cough and increased sputum production above baseline.  Patient has a chronic smoking history, states he smoked 3 packs of cigarettes per day for 30 years, now he is down to 1 pack. Family / Caregiver Present: No  Referring Practitioner: Dr. Marissa Pearson  Referral Date : 06/22/22  Diagnosis: Acute respiratory failure ([address]  Follows Commands: Within Functional Limits         Social/Functional History  Social/Functional History  Lives With: Significant other,Family (Grandkids)  Type of Home: House  Home Layout: One level  Home Access: Ramped entrance  Bathroom Shower/Tub: Tub/Shower unit,Curtain,Shower chair with back  Bathroom Toilet: Standard  Bathroom Equipment: Grab bars in shower,Hand-held shower,Toilet raiser,Grab bars around toilet,Shower chair  Bathroom Accessibility: Accessible  Home Equipment: [address] (Originally on 3L, put up to 5L at dr appointment)  Has the patient had two or more falls in the past year or any fall with injury in the past year?: No  ADL Assistance: [address] [place] [address] Avenue: Independent  Homemaking Responsibilities: Yes  Ambulation Assistance: Independent  Transfer Assistance: Independent  Active : No  Patient's  Info: Sister-in-law   Mode of Transportation: [facility]  Occupation: On disability  IADL Comments: Sleeps in recliner chair at home  Additional Comments: Reports S/O is there throughout the day - does not provide much help but is there if needed.   Vision/Hearing  Hearing  Hearing: Within functional limits    Cognition   Orientation  Overall Orientation Status: Within Functional Limits  Orientation Level: Oriented X4  Cognition  Overall Cognitive Status: WFL     Objective   Heart Rate: (!) 101 (101 while sitting, 126 while ambulating)  Heart Rate Source: Monitor  BP: 126/86  BP Location: Left upper arm  BP Method: Automatic  Patient Position: Semi fowlers  MAP (Calculated): 99.33  Resp: 16  SpO2: 96 %  O2 Device: Nasal cannula  Comment: patient was on 3L     Observation/Palpation  Posture: Fair  Observation: Peripheral IV L antecubital        AROM RLE (degrees)  RLE AROM: WFL  AROM LLE (degrees)  LLE AROM : WFL  AROM RUE (degrees)  RUE General AROM: see OT eval  AROM LUE (degrees)  LUE General AROM: see OT eval  Strength RLE  Strength RLE: WFL  Strength LLE  Strength LLE: WFL  Strength RUE  Comment: see OT eval  Strength LUE  Comment: see OT eval  Tone RLE  RLE Tone: WFL  Tone LLE  LLE Tone: WFL  Sensation  Overall Sensation Status: WFL     Bed mobility  Supine to Sit: Independent  Sit to Supine: Independent  Scooting: Independent  Bed Mobility Comments: Good hand placement  Transfers  Sit to Stand: Independent  Stand to sit: Independent  Ambulation  Surface: level tile  Device: No Device  Other Apparatus: O2 (3L)  Assistance: Stand by assistance  Quality of Gait: pt had steady gait, increased dyspnea after ambulation of 80' with  and SaO2 86%  Distance: 80'  Comments:  SaO2 levels improving with HR decreasing once pt was sitting at edge of bed  Stairs/Curb  Stairs?: No     Balance  Sitting - Static: Good  Sitting - Dynamic: Good  Standing - Static: Good  Standing - Dynamic: Fair;+  Comments: standing balance without AD           OutComes Score                                                  AM-PAC Score     AM-PAC Inpatient Mobility without Stair Climbing Raw Score : 20 (06/23/22 1628)  AM-PAC Inpatient without Stair Climbing T-Scale Score : 60.57 (06/23/22 1628)  Mobility Inpatient CMS 0-100% Score: 0 (06/23/22 1628)  Mobility Inpatient without Stair CMS G-Code Modifier : CH (06/23/22 1628)       Goals  Short Term Goals  Time Frame for Short term goals: 2-3 visits  Short term goal 1: pt will ambulate 150' with supervision to increase endurance  Short term goal 2: pt will go up and down 5 steps with SBA to increase endurance  Short term goal 3: pt able to tolerate therapeutic activity for 20-25 minutes with rest as needed to improve endurance       Education  Patient Education  Education Given To: Patient  Education Provided: Role of Therapy;Plan of Care  Education Method: Verbal;Demonstration  Education Outcome: Verbalized understanding      Therapy Time   Individual Concurrent Group Co-treatment   Time In 0878         Time Out 2552         Minutes 19               PT evaluation/treatment is completed by HERON Tamez under the direct supervision of co-signing therapist, who agrees with all documentation and evaluation/treatment.   HERON Tamez

## 2022-06-23 NOTE — CONSULTS
Urology Consultation    Patient:  Gloria Bazzi  MRN: [id number]  YOB: 1965    CHIEF COMPLAINT:  Left renal mass    HISTORY OF PRESENT ILLNESS:   The patient is a 61 y.o. male who presents with breathing issues. He had a CT, which showed a possible mass in his left kidney. He denies any voiding complaints. He has not had any hematuria. He denies any flank pain. Patient's old records, notes and chart reviewed and summarized above. Past Medical History:    No past medical history on file. Past Surgical History:    No past surgical history on file. Previous  surgery: none     Medications:    Scheduled Meds:   methylPREDNISolone  40 mg IntraVENous Q8H    sodium chloride flush  5-40 mL IntraVENous 2 times per day    enoxaparin  40 mg SubCUTAneous Daily    amLODIPine  10 mg Oral Daily    escitalopram  10 mg Oral Daily    folic acid  1 mg Oral Daily    thiamine mononitrate  100 mg Oral Daily    traZODone  50 mg Oral Nightly    ipratropium-albuterol  1 ampule Inhalation Q4H WA    azithromycin  250 mg Oral Daily    budesonide-formoterol  2 puff Inhalation Daily     Continuous Infusions:   dextrose      sodium chloride       PRN Meds:.glucose, dextrose bolus **OR** dextrose bolus, glucagon (rDNA), dextrose, sodium chloride flush, sodium chloride, ondansetron **OR** ondansetron, polyethylene glycol, acetaminophen **OR** acetaminophen, sodium chloride flush    Allergies:  Patient has no known allergies.     Social History:    Social History     Socioeconomic History    Marital status: Single     Spouse name: Not on file    Number of children: Not on file    Years of education: Not on file    Highest education level: Not on file   Occupational History    Not on file   Tobacco Use    Smoking status: Current Every Day Smoker     Packs/day: 1.00     Years: 43.00     Pack years: 43.00    Smokeless tobacco: Never Used   Substance and Sexual Activity    Alcohol use: Not Currently  Drug use: Not Currently    Sexual activity: Not on file   Other Topics Concern    Not on file   Social History Narrative    Not on file     Social Determinants of Health     Financial Resource Strain: Low Risk     Difficulty of Paying Living Expenses: Not hard at all   Food Insecurity: No Food Insecurity    Worried About Running Out of Food in the Last Year: Never true    [address] in the Last Year: Never true   Transportation Needs:     Lack of Transportation (Medical): Not on file    Lack of Transportation (Non-Medical): Not on file   Physical Activity:     Days of Exercise per Week: Not on file    Minutes of Exercise per Session: Not on file   Stress:     Feeling of Stress : Not on file   Social Connections:     Frequency of Communication with Friends and Family: Not on file    Frequency of Social Gatherings with Friends and Family: Not on file    Attends Yarsani Services: Not on file    Active Member of [address] [place] [address] [employer] or Organizations: Not on file    Attends Club or Organization Meetings: Not on file    Marital Status: Not on file   Intimate Partner Violence:     Fear of Current or Ex-Partner: Not on file    Emotionally Abused: Not on file    Physically Abused: Not on file    Sexually Abused: Not on file   Housing Stability:     Unable to Pay for Housing in the Last Year: Not on file    Number of [place] in the Last Year: Not on file    Unstable Housing in the Last Year: Not on file       Family History:  No family history on file.   Previous Urologic Family history: none    REVIEW OF SYSTEMS:  Constitutional: negative  Eyes: negative  Respiratory: SOB  Cardiovascular: negative  Gastrointestinal: negative  Genitourinary: see HPI  Musculoskeletal: negative  Skin: negative   Neurological: negative  Hematological/Lymphatic: negative  Psychological: negative    Physical Exam:    This a 61 y.o. male   Patient Vitals for the past 24 hrs:   BP Temp Temp src Pulse Resp SpO2 Height Weight 06/23/22 1511 -- -- -- -- -- 96 % -- --   06/23/22 1415 -- -- -- (!) 101 -- 96 % -- --   06/23/22 1414 -- -- -- -- -- 96 % -- --   06/23/22 1215 126/86 97.9 °F (36.6 °C) Oral (!) 101 16 94 % -- --   06/23/22 1132 -- -- -- -- -- 95 % -- --   06/23/22 0726 -- -- -- -- -- 97 % -- --   06/23/22 0645 118/83 97.5 °F (36.4 °C) Oral 96 18 98 % -- --   06/23/22 0530 -- -- -- -- -- -- -- 144 lb 6.4 oz (65.5 kg)   06/22/22 2330 103/69 98.4 °F (36.9 °C) Oral 81 18 96 % -- --   06/22/22 1859 -- -- -- -- -- 95 % -- --   06/22/22 1845 115/79 98.3 °F (36.8 °C) Oral 83 -- 97 % -- --   06/22/22 1730 -- -- -- -- -- -- 5' 7\" (1.702 m) 140 lb (63.5 kg)   06/22/22 1715 119/83 98 °F (36.7 °C) Oral 79 20 95 % -- --     Constitutional: Patient in no acute distress; Neuro: alert and oriented to person place and time. Psych: Mood and affect normal.  Skin: Normal  Lungs: Respiratory effort normal  Cardiovascular:  Normal peripheral pulses  Abdomen: Soft, non-tender, non-distended with no CVA, flank pain, hepatosplenomegaly or hernia. Kidneys normal.  Bladder non-tender and not distended. Lymphatics: no palpable lymphadenopathy  Penis normal and circumcised  Urethral meatus normal    LABS:  Recent Labs     06/22/22  1543 06/23/22  0454   WBC 7.0 4.1   HGB 12.5* 12.1*   HCT 39.2* 37.5*   MCV 92.5 92.9    251     Recent Labs     06/22/22  1543 06/23/22  0454 06/23/22  1228    137  --    K 4.1 5.4* 4.0   CL 97* 98  --    CO2 33* 33*  --    BUN 13 14  --    CREATININE 0.58* 0.62*  --      No results found for: PSA    Additional Lab/culture results:    Urinalysis: No results for input(s): COLORU, PHUR, LABCAST, WBCUA, RBCUA, MUCUS, TRICHOMONAS, YEAST, BACTERIA, CLARITYU, SPECGRAV, LEUKOCYTESUR, UROBILINOGEN, BILIRUBINUR, BLOODU in the last 72 hours. Invalid input(s): NITRATE, GLUCOSEUKETONESUAMORPHOUS     -----------------------------------------------------------------  Imaging Results:    CT images reviewed.   Mass in left kidney looks like a cyst.  No enhancement noted. Assessment and Plan   Impression:    Patient Active Problem List   Diagnosis    Chronic respiratory failure with hypoxia ([address]    Alcoholism ([address]    Abuse of smoked substance ([address]    Chronic obstructive pulmonary disease (HCC)    Benign essential HTN    Recurrent major depressive disorder, in partial remission ([address]    Hyponatremia    Hyperkalemia    Vitamin D deficiency    Renal mass, left    COPD exacerbation (HCC)    Acute respiratory failure ([address]       Plan:     61year-old male with a left renal mass  Lesion looks like a cyst.   No enhancement noted on CT. Will obtain renal ultrasound while he is here. Lesion will not likely require any intervention. Thank you.      Bonnie Garcia MD  5:01 PM 6/23/2022

## 2022-06-23 NOTE — PROGRESS NOTES
BRONCHOSPASM/BRONCHOCONSTRICTION     [x]         IMPROVE AERATION/BREATH SOUNDS  [x]   ADMINISTER BRONCHODILATOR THERAPY AS APPROPRIATE  [x]   ASSESS BREATH SOUNDS  []   IMPLEMENT AEROSOL/MDI PROTOCOL  [x]   PATIENT EDUCATION AS NEEDED    PATIENT REFUSES TO WEAR BIPAP     [x] Risks and benefits explained to patient   [x] Patient refuses to wear Bipap stating \"I dont wear that machine. \"  [x] Patient verbalizes understanding of information presented.

## 2022-06-23 NOTE — PLAN OF CARE
Problem: Discharge Planning  Goal: Discharge to home or other facility with appropriate resources  6/23/2022 1654 by Michelle Olguin RN  Outcome: Progressing  Flowsheets (Taken 6/23/2022 9572)  Discharge to home or other facility with appropriate resources:   Identify barriers to discharge with patient and caregiver   Refer to discharge planning if patient needs post-hospital services based on physician order or complex needs related to functional status, cognitive ability or social support system  6/23/2022 0618 by Dajuan Palacio RN  Outcome: Progressing     Problem: ABCDS Injury Assessment  Goal: Absence of physical injury  6/23/2022 1654 by Michelle Olguin RN  Outcome: Progressing  Flowsheets (Taken 6/23/2022 0824)  Absence of Physical Injury: Implement safety measures based on patient assessment  6/23/2022 0618 by Dajuan Palacio RN  Outcome: Progressing     Problem: Pain  Goal: Verbalizes/displays adequate comfort level or baseline comfort level  6/23/2022 1654 by Michelle Olguin RN  Outcome: Progressing  6/23/2022 0618 by Dajuan Palacio RN  Outcome: Progressing

## 2022-06-23 NOTE — CARE COORDINATION
CASE MANAGEMENT NOTE:    Admission Date:  6/22/2022 Oscar Dorman is a 61 y.o.  male    Admitted for : Acute respiratory failure ([facility] [J96.00]  COPD exacerbation ([facility] [J44.1]  Acute on chronic respiratory failure with hypoxia ([facility] [J96.21]    Met with:  Family    PCP:  Dr. Gt Zamora:  Jennifer Cameron      Is patient alert and oriented at time of discussion:  Yes    Current Residence/ Living Arrangements:  independently at home             Current Services PTA:  No    Does patient go to outpatient dialysis: No  If yes, location and chair time:     Is patient agreeable to VNS: No    Freedom of choice provided:  No    List of [address] provided: No    VNS chosen:  No    DME:  none    Home Oxygen: Yes    Nebulizer: Yes    CPAP/BIPAP: NA    Supplier: N/A    Potential Assistance Needed: No    SNF needed: No    Freedom of choice and list provided: NA    Ph[facility] [address]        Is patient currently receiving oral anticoagulation therapy? No    Is the Patient an [facility]ILLAGOME[facility] with Readmission Risk Score greater than 14%? No  If yes, pt needs a follow up appointment made within 7 days. Family Members/Caregivers that pt would like involved in their care:    Yes    If yes, list name here: Yossi Barr    Transportation Provider:  Family             Discharge Plan:  6/23/22 Latonya Pt is from home in a one story home with his family DME home ox and neb VNS denies Plan is to discharge to home with no needs will continue to follow for needs . //tv                 Electronically signed by:  Hobert Sever, RN on 6/23/2022 at 2:49 PM

## 2022-06-24 ENCOUNTER — APPOINTMENT (OUTPATIENT)
Dept: ULTRASOUND IMAGING | Age: 57
DRG: 140 | End: 2022-06-24
Payer: COMMERCIAL

## 2022-06-24 VITALS
SYSTOLIC BLOOD PRESSURE: 115 MMHG | WEIGHT: 154.1 LBS | HEART RATE: 91 BPM | DIASTOLIC BLOOD PRESSURE: 73 MMHG | RESPIRATION RATE: 16 BRPM | OXYGEN SATURATION: 95 % | BODY MASS INDEX: 24.19 KG/M2 | HEIGHT: 67 IN | TEMPERATURE: 97.9 F

## 2022-06-24 LAB
ANION GAP SERPL CALCULATED.3IONS-SCNC: 4 MMOL/L (ref 9–17)
BUN BLDV-MCNC: 16 MG/DL (ref 6–20)
CALCIUM SERPL-MCNC: 8.8 MG/DL (ref 8.6–10.4)
CHLORIDE BLD-SCNC: 98 MMOL/L (ref 98–107)
CO2: 36 MMOL/L (ref 20–31)
CREAT SERPL-MCNC: 0.56 MG/DL (ref 0.7–1.2)
GFR AFRICAN AMERICAN: >60 ML/MIN
GFR NON-AFRICAN AMERICAN: >60 ML/MIN
GFR SERPL CREATININE-BSD FRML MDRD: ABNORMAL ML/MIN/{1.73_M2}
GLUCOSE BLD-MCNC: 125 MG/DL (ref 75–110)
GLUCOSE BLD-MCNC: 144 MG/DL (ref 70–99)
HCT VFR BLD CALC: 35.8 % (ref 41–53)
HEMOGLOBIN: 11.5 G/DL (ref 13.5–17.5)
MCH RBC QN AUTO: 29.8 PG (ref 26–34)
MCHC RBC AUTO-ENTMCNC: 32.3 G/DL (ref 31–37)
MCV RBC AUTO: 92.4 FL (ref 80–100)
PDW BLD-RTO: 13.6 % (ref 11.5–14.9)
PLATELET # BLD: 237 K/UL (ref 150–450)
PMV BLD AUTO: 6.9 FL (ref 6–12)
POTASSIUM SERPL-SCNC: 5 MMOL/L (ref 3.7–5.3)
PROSTATE SPECIFIC ANTIGEN: 6.74 NG/ML
RBC # BLD: 3.87 M/UL (ref 4.5–5.9)
SODIUM BLD-SCNC: 138 MMOL/L (ref 135–144)
WBC # BLD: 10.1 K/UL (ref 3.5–11)

## 2022-06-24 PROCEDURE — 76770 US EXAM ABDO BACK WALL COMP: CPT

## 2022-06-24 PROCEDURE — 36415 COLL VENOUS BLD VENIPUNCTURE: CPT

## 2022-06-24 PROCEDURE — 82947 ASSAY GLUCOSE BLOOD QUANT: CPT

## 2022-06-24 PROCEDURE — 94761 N-INVAS EAR/PLS OXIMETRY MLT: CPT

## 2022-06-24 PROCEDURE — 2700000000 HC OXYGEN THERAPY PER DAY

## 2022-06-24 PROCEDURE — 6370000000 HC RX 637 (ALT 250 FOR IP)

## 2022-06-24 PROCEDURE — 94640 AIRWAY INHALATION TREATMENT: CPT

## 2022-06-24 PROCEDURE — 6360000002 HC RX W HCPCS: Performed by: STUDENT IN AN ORGANIZED HEALTH CARE EDUCATION/TRAINING PROGRAM

## 2022-06-24 PROCEDURE — 85027 COMPLETE CBC AUTOMATED: CPT

## 2022-06-24 PROCEDURE — 6360000002 HC RX W HCPCS: Performed by: INTERNAL MEDICINE

## 2022-06-24 PROCEDURE — 99239 HOSP IP/OBS DSCHRG MGMT >30: CPT | Performed by: INTERNAL MEDICINE

## 2022-06-24 PROCEDURE — 80048 BASIC METABOLIC PNL TOTAL CA: CPT

## 2022-06-24 RX ORDER — PREDNISONE 20 MG/1
20 TABLET ORAL DAILY
Qty: 5 TABLET | Refills: 0 | Status: SHIPPED | OUTPATIENT
Start: 2022-06-24 | End: 2022-06-29

## 2022-06-24 RX ORDER — AZITHROMYCIN 250 MG/1
250 TABLET, FILM COATED ORAL DAILY
Qty: 2 TABLET | Refills: 0 | Status: SHIPPED | OUTPATIENT
Start: 2022-06-25 | End: 2022-06-27

## 2022-06-24 RX ADMIN — BUDESONIDE AND FORMOTEROL FUMARATE DIHYDRATE 2 PUFF: 160; 4.5 AEROSOL RESPIRATORY (INHALATION) at 07:37

## 2022-06-24 RX ADMIN — FOLIC ACID 1 MG: 1 TABLET ORAL at 08:03

## 2022-06-24 RX ADMIN — IPRATROPIUM BROMIDE AND ALBUTEROL SULFATE 1 AMPULE: .5; 2.5 SOLUTION RESPIRATORY (INHALATION) at 10:45

## 2022-06-24 RX ADMIN — ENOXAPARIN SODIUM 40 MG: 100 INJECTION SUBCUTANEOUS at 08:04

## 2022-06-24 RX ADMIN — METHYLPREDNISOLONE SODIUM SUCCINATE 40 MG: 40 INJECTION, POWDER, LYOPHILIZED, FOR SOLUTION INTRAMUSCULAR; INTRAVENOUS at 04:00

## 2022-06-24 RX ADMIN — THIAMINE HCL TAB 100 MG 100 MG: 100 TAB at 08:03

## 2022-06-24 RX ADMIN — AMLODIPINE BESYLATE 10 MG: 10 TABLET ORAL at 08:03

## 2022-06-24 RX ADMIN — IPRATROPIUM BROMIDE AND ALBUTEROL SULFATE 1 AMPULE: .5; 2.5 SOLUTION RESPIRATORY (INHALATION) at 07:38

## 2022-06-24 RX ADMIN — ESCITALOPRAM OXALATE 10 MG: 10 TABLET ORAL at 08:04

## 2022-06-24 RX ADMIN — AZITHROMYCIN MONOHYDRATE 250 MG: 250 TABLET ORAL at 08:04

## 2022-06-24 RX ADMIN — METHYLPREDNISOLONE SODIUM SUCCINATE 40 MG: 40 INJECTION, POWDER, LYOPHILIZED, FOR SOLUTION INTRAMUSCULAR; INTRAVENOUS at 13:59

## 2022-06-24 RX ADMIN — IPRATROPIUM BROMIDE AND ALBUTEROL SULFATE 1 AMPULE: .5; 2.5 SOLUTION RESPIRATORY (INHALATION) at 15:04

## 2022-06-24 NOTE — CARE COORDINATION
ONGOING DISCHARGE PLAN:    Patient is alert and oriented x4. Spoke with patient regarding discharge plan and patient confirms that plan is still to discharge to home with no needs  Patient had a renal ultrasound done   Resume lexapro  Possible discharge to home   Iv solu-medrol 40 mg q 8 hours     Will continue to follow for additional discharge needs.     Electronically signed by Jessica Armendariz RN on 6/24/2022 at 3:43 PM

## 2022-06-24 NOTE — PLAN OF CARE
Problem: Discharge Planning  Goal: Discharge to home or other facility with appropriate resources  6/24/2022 0540 by Roshan Lilly RN  Outcome: Progressing     Problem: ABCDS Injury Assessment  Goal: Absence of physical injury  6/24/2022 0540 by Roshan Lilly RN  Outcome: Progressing     Problem: Pain  Goal: Verbalizes/displays adequate comfort level or baseline comfort level  6/24/2022 0540 by Roshan Lilly RN  Outcome: Progressing

## 2022-06-24 NOTE — FLOWSHEET NOTE
Pt taken down to front entrance where sister in-law was waiting in vehicle. All belongings sent with pt.

## 2022-06-24 NOTE — PROGRESS NOTES
[address] [facility] [place]    PROGRESS NOTE             6/24/2022    8:22 AM    Name:   Rashad Michael  MRN:     [id number]     Acct:      [de-identified]   Room:   2117/2117-01   Day:  2  Admit Date:  6/22/2022  2:54 PM    PCP:  Ivy King MD  Code Status:  Full Code    Subjective:     C/C:   Chief Complaint   Patient presents with    Shortness of Breath     Interval History Status: improved. Patient seen examined at bedside. No acute events reported overnight. Urology evaluated patient yesterday due to left renal mass. Mass most likely to be cystic in nature. Ordered renal ultrasound for further evaluation, they do not think mass will require any intervention. Patient states his breathing is getting better but still reporting of some shortness of breath. Patient is on 3 L nasal cannula saturating at 97%. Patient denies chest pain, shortness of breath, nausea and vomiting. Review of Systems:     Review of Systems   Constitutional: Negative for fatigue and fever. Respiratory: Positive for shortness of breath. Negative for cough. Cardiovascular: Negative for chest pain, palpitations and leg swelling. Gastrointestinal: Negative for abdominal distention, abdominal pain, constipation, diarrhea, nausea and vomiting. Genitourinary: Negative for dysuria, flank pain, frequency and hematuria. Musculoskeletal: Negative. Neurological: Negative for dizziness, weakness, numbness and headaches.        Medications:      Allergies:  No Known Allergies    Current Meds:   Scheduled Meds:    methylPREDNISolone  40 mg IntraVENous Q8H    sodium chloride flush  5-40 mL IntraVENous 2 times per day    enoxaparin  40 mg SubCUTAneous Daily    amLODIPine  10 mg Oral Daily    escitalopram  10 mg Oral Daily    folic acid  1 mg Oral Daily    thiamine mononitrate  100 mg Oral Daily    traZODone  50 mg Oral Nightly    ipratropium-albuterol  1 ampule Inhalation Q4H WA    azithromycin  250 mg Oral Daily    budesonide-formoterol  2 puff Inhalation Daily     Continuous Infusions:    dextrose      sodium chloride       PRN Meds: glucose, dextrose bolus **OR** dextrose bolus, glucagon (rDNA), dextrose, sodium chloride flush, sodium chloride, ondansetron **OR** ondansetron, polyethylene glycol, acetaminophen **OR** acetaminophen, sodium chloride flush    Data:     Past Medical History:   has no past medical history on file. Social History:   reports that he has been smoking. He has a 43.00 pack-year smoking history. He has never used smokeless tobacco. He reports previous alcohol use. He reports previous drug use. Family History: No family history on file. Vitals:  /88   Pulse 90   Temp 97.7 °F (36.5 °C) (Oral)   Resp 22   Ht 5' 7\" (1.702 m)   Wt 154 lb 1.6 oz (69.9 kg)   SpO2 98%   BMI 24.14 kg/m²   Temp (24hrs), Av.7 °F (36.5 °C), Min:97.3 °F (36.3 °C), Max:97.9 °F (36.6 °C)    Recent Labs     22  1230   POCGLU 156*       I/O(24Hr): Intake/Output Summary (Last 24 hours) at 2022 8521  Last data filed at 2022 2214  Gross per 24 hour   Intake 951 ml   Output --   Net 951 ml       Labs:  [unfilled]    No results found for: SPECIAL  No results found for: CULTURE    [unfilled]    Radiology:    XR CHEST (SINGLE VIEW FRONTAL)    Result Date: 2022  EXAMINATION: ONE XRAY VIEW OF THE CHEST 2022 9:30 pm COMPARISON: 2022 HISTORY: ORDERING SYSTEM PROVIDED HISTORY: COPD exacerbation TECHNOLOGIST PROVIDED HISTORY: COPD exacerbation FINDINGS: No lung infiltrate or consolidation. No pneumothorax or pleural effusion. Heart size is normal.     No acute abnormality.      CT ABDOMEN PELVIS W IV CONTRAST Additional Contrast? None    Result Date: 2022  EXAMINATION: CTA OF THE CHEST; CT OF THE ABDOMEN AND PELVIS WITH CONTRAST 2022 4:51 pm TECHNIQUE: CTA of the chest was performed after the administration of intravenous contrast.  Multiplanar reformatted images are provided for review. MIP images are provided for review. Automated exposure control, iterative reconstruction, and/or weight based adjustment of the mA/kV was utilized to reduce the radiation dose to as low as reasonably achievable.; CT of the abdomen and pelvis was performed with the administration of intravenous contrast. Multiplanar reformatted images are provided for review. Automated exposure control, iterative reconstruction, and/or weight based adjustment of the mA/kV was utilized to reduce the radiation dose to as low as reasonably achievable. COMPARISON: CT chest dated 04/21/2022. Chest x-ray dated 06/07/2022 HISTORY: ORDERING SYSTEM PROVIDED HISTORY: sob, hypoxia, tachycardia TECHNOLOGIST PROVIDED HISTORY: sob, hypoxia, tachycardia Decision Support Exception - unselect if not a suspected or confirmed emergency medical condition->Emergency Medical Condition (MA) Reason for Exam: sob, hypoxia, tachycardia FINDINGS: Pulmonary Arteries: Pulmonary arteries are adequately opacified for evaluation. No evidence of intraluminal filling defect to suggest pulmonary embolism. Main pulmonary arteries are mildly dilated. Mediastinum: No evidence of mediastinal lymphadenopathy. The heart and pericardium demonstrate no acute abnormality. There is no acute abnormality of the thoracic aorta. Lungs/pleura: Detailed assessment is mildly limited by respiratory motion. There is mild centrilobular emphysema. There is mild diffuse bronchial wall thickening. No focal airspace consolidation, pneumothorax, or pleural effusion. A couple of tiny 0.3 cm areas of nodularity are either unchanged from the previous study or obscured by respiratory motion on today's study. No new or enlarging pulmonary nodule. Abdominal organs: Tiny cyst noted in the central liver. Liver is otherwise unremarkable. Gallbladder, spleen, pancreas, and adrenal glands are unremarkable.  There is an exophytic lesion at the anterior mid left kidney, measuring 3.7 x 3.3 cm, which is slightly more dense than expected for a simple cyst.  There is symmetric enhancement of the kidneys. No hydronephrosis or perinephric inflammation. GI/bowel: There is scattered colonic diverticulosis. There is no abnormal bowel distention or pericolonic inflammation. No free intraperitoneal air or fluid. Appendix is normal. Pelvis: Urinary bladder is within normal limits. No pelvic lymphadenopathy. Atherosclerosis noted in the iliac arteries. Peritoneum/retroperitoneum: The abdominal aorta is normal in caliber. There is no retroperitoneal or mesenteric lymphadenopathy. There is mild-to-moderate atherosclerosis throughout the abdominal aorta and major visceral arteries. Soft Tissues/Bones: No acute bone or soft tissue abnormality. 1.  No evidence of acute pulmonary embolism. 2.  No acute intrathoracic process. 3.  Mild centrilobular emphysema and diffuse bronchial wall thickening, suggesting bronchitis, likely chronic. 4.  No acute process in the abdomen or pelvis. 5.  Exophytic 3.7 cm lesion at the mid left kidney is slightly more dense than expected for a simple cyst, most likely a cyst with proteinaceous/hemorrhagic contents. When compared to an unenhanced CT chest dated 04/21/2022, there is no appreciable enhancement. XR CHEST PORTABLE    Result Date: 6/7/2022  EXAMINATION: ONE XRAY VIEW OF THE CHEST 6/7/2022 7:34 pm COMPARISON: None. HISTORY: ORDERING SYSTEM PROVIDED HISTORY: CP, IBRAHIMA TECHNOLOGIST PROVIDED HISTORY: CP, IBRAHIMA Reason for Exam: PT CO CP with SOB X several days. FINDINGS: AP portable view of the chest time stamped at 1920 hours demonstrates normal cardiac size. Aortic arch is calcified. Granulomatous changes are present in the benjamin areas. Emphysematous changes are present in the lungs. No focal consolidation, effusion or pneumothorax is noted. Osseous structures are age-appropriate. Emphysematous changes in the lungs without acute abnormality. CT CHEST PULMONARY EMBOLISM W CONTRAST    Result Date: 6/23/2022  EXAMINATION: CTA OF THE CHEST; CT OF THE ABDOMEN AND PELVIS WITH CONTRAST 6/22/2022 4:51 pm TECHNIQUE: CTA of the chest was performed after the administration of intravenous contrast.  Multiplanar reformatted images are provided for review. MIP images are provided for review. Automated exposure control, iterative reconstruction, and/or weight based adjustment of the mA/kV was utilized to reduce the radiation dose to as low as reasonably achievable.; CT of the abdomen and pelvis was performed with the administration of intravenous contrast. Multiplanar reformatted images are provided for review. Automated exposure control, iterative reconstruction, and/or weight based adjustment of the mA/kV was utilized to reduce the radiation dose to as low as reasonably achievable. COMPARISON: CT chest dated 04/21/2022. Chest x-ray dated 06/07/2022 HISTORY: ORDERING SYSTEM PROVIDED HISTORY: sob, hypoxia, tachycardia TECHNOLOGIST PROVIDED HISTORY: sob, hypoxia, tachycardia Decision Support Exception - unselect if not a suspected or confirmed emergency medical condition->Emergency Medical Condition (MA) Reason for Exam: sob, hypoxia, tachycardia FINDINGS: Pulmonary Arteries: Pulmonary arteries are adequately opacified for evaluation. No evidence of intraluminal filling defect to suggest pulmonary embolism. Main pulmonary arteries are mildly dilated. Mediastinum: No evidence of mediastinal lymphadenopathy. The heart and pericardium demonstrate no acute abnormality. There is no acute abnormality of the thoracic aorta. Lungs/pleura: Detailed assessment is mildly limited by respiratory motion. There is mild centrilobular emphysema. There is mild diffuse bronchial wall thickening. No focal airspace consolidation, pneumothorax, or pleural effusion.   A couple of tiny 0.3 cm areas of nodularity are either unchanged from the previous study or obscured by respiratory motion on today's study. No new or enlarging pulmonary nodule. Abdominal organs: Tiny cyst noted in the central liver. Liver is otherwise unremarkable. Gallbladder, spleen, pancreas, and adrenal glands are unremarkable. There is an exophytic lesion at the anterior mid left kidney, measuring 3.7 x 3.3 cm, which is slightly more dense than expected for a simple cyst.  There is symmetric enhancement of the kidneys. No hydronephrosis or perinephric inflammation. GI/bowel: There is scattered colonic diverticulosis. There is no abnormal bowel distention or pericolonic inflammation. No free intraperitoneal air or fluid. Appendix is normal. Pelvis: Urinary bladder is within normal limits. No pelvic lymphadenopathy. Atherosclerosis noted in the iliac arteries. Peritoneum/retroperitoneum: The abdominal aorta is normal in caliber. There is no retroperitoneal or mesenteric lymphadenopathy. There is mild-to-moderate atherosclerosis throughout the abdominal aorta and major visceral arteries. Soft Tissues/Bones: No acute bone or soft tissue abnormality. 1.  No evidence of acute pulmonary embolism. 2.  No acute intrathoracic process. 3.  Mild centrilobular emphysema and diffuse bronchial wall thickening, suggesting bronchitis, likely chronic. 4.  No acute process in the abdomen or pelvis. 5.  Exophytic 3.7 cm lesion at the mid left kidney is slightly more dense than expected for a simple cyst, most likely a cyst with proteinaceous/hemorrhagic contents. When compared to an unenhanced CT chest dated 04/21/2022, there is no appreciable enhancement. Physical Examination:          Physical Exam  Constitutional:       General: He is not in acute distress. Appearance: Normal appearance. HENT:      Head: Normocephalic and atraumatic. Cardiovascular:      Rate and Rhythm: Normal rate and regular rhythm. Pulses: Normal pulses.       Heart sounds: Normal heart sounds. No murmur heard. Pulmonary:      Effort: Pulmonary effort is normal.      Breath sounds: Wheezing present. No rhonchi or rales. Abdominal:      General: Abdomen is flat. Bowel sounds are normal. There is no distension. Palpations: Abdomen is soft. There is no mass. Tenderness: There is no abdominal tenderness. Musculoskeletal:      Right lower leg: No edema. Left lower leg: No edema. Neurological:      General: No focal deficit present. Mental Status: He is alert and oriented to person, place, and time.    Psychiatric:         Mood and Affect: Mood normal.   Assessment:        Primary Problem  COPD exacerbation [facility])    Active Hospital Problems    Diagnosis Date Noted    COPD exacerbation ([facility] [J44.1] 06/22/2022     Priority: Medium    Acute respiratory failure ([facility] [J96.00] 06/22/2022     Priority: Medium    Renal mass, left [N28.89] 06/22/2022     Priority: Medium    Benign essential HTN [I10] 01/28/2022    Alcoholism ([facility] [F10.20] 01/28/2022       Plan:        COPD exacerbation   -Increased dyspnea, sputum production, cough for 3 days  -On 5 L oxygen at home  -Given 125 mg IV Solu-Medrol in the ED  -Given 100 mg doxycycline in the ED  -IV Solu-Medrol 40 mg every 8  -Zithromax 250 mg, day 3 of antibiotics  -DuoNeb every 4 hours  -Respiratory care eval and treat  -Resumed home Symbicort and Spiriva  -CT PE is negative for PE.  -VBG in the ED showed compensated respiratory acidosis               - pH of 7.371, PCO2 63, bicarb 36.5     History of renal mass  -CT lung screen on 04/21/2022 showed a 3 cm left renal mass  -CT abdomen pelvis showed 3.7 cm left renal mass   -Urology consulted   -Ordered a renal ultrasound, mass is most likely cystic in nature, no enhancement seen on CT, they do not think lesion will require any intervention     Depression  -Resumed home Lexapro     Hypertension  -Blood pressure in the /83  -Resumed home Chris Moses MD  6/24/2022  8:22 AM     I have discussed the care of Noa Del Rosario , including pertinent history and exam findings,    today with the resident. I have seen and examined the patient and the key elements of all parts of the encounter have been performed by me . I agree with the assessment, plan and orders as documented by the resident. Principal Problem:    COPD exacerbation ([address]  Active Problems:    Renal mass, left    Acute respiratory failure (HCC)    Alcoholism (HCC)    Benign essential HTN  Resolved Problems:    * No resolved hospital problems. *        Overall  course ;                                   are improving over time.         Patient clinically doing better  DC planning          Electronically signed by Chris White MD

## 2022-06-24 NOTE — PLAN OF CARE
Problem: Discharge Planning  Goal: Discharge to home or other facility with appropriate resources  6/24/2022 1506 by Xavier Hood RN  Outcome: Completed  Flowsheets (Taken 6/24/2022 0806)  Discharge to home or other facility with appropriate resources:   Identify barriers to discharge with patient and caregiver   Refer to discharge planning if patient needs post-hospital services based on physician order or complex needs related to functional status, cognitive ability or social support system  6/24/2022 0540 by Marcelino Shelby RN  Outcome: Progressing     Problem: ABCDS Injury Assessment  Goal: Absence of physical injury  6/24/2022 1506 by Xavier Hood RN  Outcome: Completed  Flowsheets (Taken 6/24/2022 0808)  Absence of Physical Injury: Implement safety measures based on patient assessment  6/24/2022 0540 by Marcelino Shelby RN  Outcome: Progressing     Problem: Pain  Goal: Verbalizes/displays adequate comfort level or baseline comfort level  6/24/2022 1506 by Xavier Hood RN  Outcome: Completed  6/24/2022 0540 by Marcelino Shelby RN  Outcome: Progressing

## 2022-06-25 NOTE — DISCHARGE SUMMARY
[address]    Discharge Summary     Patient ID: Svetlana Malik  :  1965   MRN: [id number]     ACCOUNT:  [de-identified]   Patient's PCP: Kelton Aly MD  Admit Date: 2022   Discharge Date: 2022    Length of Stay: 2  Code Status:  Prior  Admitting Physician: Deshawn Deleon MD  Discharge Physician: Joseph Soliman MD     Active Discharge Diagnoses:       Primary Problem  COPD exacerbation [facility])      [place] Problems    Diagnosis Date Noted    COPD exacerbation ([facility] [address] [J44.1] 2022     Priority: Medium    Acute respiratory failure ([facility] [address] [J96.00] 2022     Priority: Medium    Renal mass, left [N28.89] 2022     Priority: Medium    Benign essential HTN [I10] 2022    Alcoholism ([facility] [address] [F10.20] 2022       Admission Condition:  fair     Discharged Condition: good    Hospital Stay:       Hospital Course:      Patient is a 62 yo M patient with history of COPD, hypertension, depression, presented to the ED with a 3-day history of dyspnea, increased cough and increased sputum production above baseline. Patient went to his PCP today for hospital follow-up secondary to COPD exacerbation on . Patient went to the ED  for COPD exacerbation. ,  Was discharged on doxycycline for 5 days and prednisone 50 mg also for 5 days. Patient at the office today was saturating at 86% on 5 L. Patient was then transferred to the ER from the office. Patient has a chronic smoking history, states he smoked 3 packs of cigarettes per day for 30 years, now he is down to 1 pack. Patient had CT of the lung screen which was negative for nodules, but did show a 3 cm left renal mass. In the ED patient was on 5 L nasal cannula saturating at 95%. Vitals were within normal limits. Patient was given a one-time dose of 125 mg IV Solu-Medrol, and doxycycline.   CT scanning of the lungs and abdomen was also done.  Pro-Nikolai was unremarkable, troponin also unremarkable. COVID and flu negative. Patient was started on IV Solu-Medrol 40 mg every 8, Zithromax was given for COPD exacerbation on the floor. And patient also received DuoNeb breathing treatments. Urology was consulted due to renal mass, ordered a renal ultrasound which said mass was most likely cystic in nature. Urologist did not think lesion required any intervention. Patient on day of discharge was on 3 L nasal cannula saturating at 97%. Patient stated his breathing has improved. Patient had no complaints. Significant therapeutic interventions:   IV Solu-Medrol  Zithromax    Significant Diagnostic Studies:   Labs / Micro:    Radiology:    XR CHEST (SINGLE VIEW FRONTAL)    Result Date: 6/23/2022  EXAMINATION: ONE XRAY VIEW OF THE CHEST 6/22/2022 9:30 pm COMPARISON: 06/07/2022 HISTORY: ORDERING SYSTEM PROVIDED HISTORY: COPD exacerbation TECHNOLOGIST PROVIDED HISTORY: COPD exacerbation FINDINGS: No lung infiltrate or consolidation. No pneumothorax or pleural effusion. Heart size is normal.     No acute abnormality. US RENAL COMPLETE    Result Date: 6/24/2022  EXAMINATION: RETROPERITONEAL ULTRASOUND OF THE KIDNEYS AND URINARY BLADDER 6/24/2022 COMPARISON: None HISTORY: ORDERING SYSTEM PROVIDED HISTORY: left renal mass TECHNOLOGIST PROVIDED HISTORY: left renal mass FINDINGS: Kidneys: The right kidney measures 11.4 cm in length and the left kidney measures 12.3 cm in length. Normal renal cortical echogenicity. No nephrolithiasis or solid mass. No hydronephrosis. 4 cm simple cyst inferior left kidney. 4 cm simple cyst inferior left kidney. Otherwise unremarkable.      CT ABDOMEN PELVIS W IV CONTRAST Additional Contrast? None    Result Date: 6/23/2022  EXAMINATION: CTA OF THE CHEST; CT OF THE ABDOMEN AND PELVIS WITH CONTRAST 6/22/2022 4:51 pm TECHNIQUE: CTA of the chest was performed after the administration of intravenous contrast.  Multiplanar reformatted images are provided for review. MIP images are provided for review. Automated exposure control, iterative reconstruction, and/or weight based adjustment of the mA/kV was utilized to reduce the radiation dose to as low as reasonably achievable.; CT of the abdomen and pelvis was performed with the administration of intravenous contrast. Multiplanar reformatted images are provided for review. Automated exposure control, iterative reconstruction, and/or weight based adjustment of the mA/kV was utilized to reduce the radiation dose to as low as reasonably achievable. COMPARISON: CT chest dated 04/21/2022. Chest x-ray dated 06/07/2022 HISTORY: ORDERING SYSTEM PROVIDED HISTORY: sob, hypoxia, tachycardia TECHNOLOGIST PROVIDED HISTORY: sob, hypoxia, tachycardia Decision Support Exception - unselect if not a suspected or confirmed emergency medical condition->Emergency Medical Condition (MA) Reason for Exam: sob, hypoxia, tachycardia FINDINGS: Pulmonary Arteries: Pulmonary arteries are adequately opacified for evaluation. No evidence of intraluminal filling defect to suggest pulmonary embolism. Main pulmonary arteries are mildly dilated. Mediastinum: No evidence of mediastinal lymphadenopathy. The heart and pericardium demonstrate no acute abnormality. There is no acute abnormality of the thoracic aorta. Lungs/pleura: Detailed assessment is mildly limited by respiratory motion. There is mild centrilobular emphysema. There is mild diffuse bronchial wall thickening. No focal airspace consolidation, pneumothorax, or pleural effusion. A couple of tiny 0.3 cm areas of nodularity are either unchanged from the previous study or obscured by respiratory motion on today's study. No new or enlarging pulmonary nodule. Abdominal organs: Tiny cyst noted in the central liver. Liver is otherwise unremarkable. Gallbladder, spleen, pancreas, and adrenal glands are unremarkable.  There is an exophytic lesion at the anterior mid left kidney, measuring 3.7 x 3.3 cm, which is slightly more dense than expected for a simple cyst.  There is symmetric enhancement of the kidneys. No hydronephrosis or perinephric inflammation. GI/bowel: There is scattered colonic diverticulosis. There is no abnormal bowel distention or pericolonic inflammation. No free intraperitoneal air or fluid. Appendix is normal. Pelvis: Urinary bladder is within normal limits. No pelvic lymphadenopathy. Atherosclerosis noted in the iliac arteries. Peritoneum/retroperitoneum: The abdominal aorta is normal in caliber. There is no retroperitoneal or mesenteric lymphadenopathy. There is mild-to-moderate atherosclerosis throughout the abdominal aorta and major visceral arteries. Soft Tissues/Bones: No acute bone or soft tissue abnormality. 1.  No evidence of acute pulmonary embolism. 2.  No acute intrathoracic process. 3.  Mild centrilobular emphysema and diffuse bronchial wall thickening, suggesting bronchitis, likely chronic. 4.  No acute process in the abdomen or pelvis. 5.  Exophytic 3.7 cm lesion at the mid left kidney is slightly more dense than expected for a simple cyst, most likely a cyst with proteinaceous/hemorrhagic contents. When compared to an unenhanced CT chest dated 04/21/2022, there is no appreciable enhancement. XR CHEST PORTABLE    Result Date: 6/7/2022  EXAMINATION: ONE XRAY VIEW OF THE CHEST 6/7/2022 7:34 pm COMPARISON: None. HISTORY: ORDERING SYSTEM PROVIDED HISTORY: TYSON, IBRAHIMA TECHNOLOGIST PROVIDED HISTORY: TYSON, IBRAHIMA Reason for Exam: PT CO CP with SOB X several days. FINDINGS: AP portable view of the chest time stamped at 1920 hours demonstrates normal cardiac size. Aortic arch is calcified. Granulomatous changes are present in the benjamin areas. Emphysematous changes are present in the lungs. No focal consolidation, effusion or pneumothorax is noted. Osseous structures are age-appropriate.      Emphysematous changes in the lungs without acute abnormality. CT CHEST PULMONARY EMBOLISM W CONTRAST    Result Date: 6/23/2022  EXAMINATION: CTA OF THE CHEST; CT OF THE ABDOMEN AND PELVIS WITH CONTRAST 6/22/2022 4:51 pm TECHNIQUE: CTA of the chest was performed after the administration of intravenous contrast.  Multiplanar reformatted images are provided for review. MIP images are provided for review. Automated exposure control, iterative reconstruction, and/or weight based adjustment of the mA/kV was utilized to reduce the radiation dose to as low as reasonably achievable.; CT of the abdomen and pelvis was performed with the administration of intravenous contrast. Multiplanar reformatted images are provided for review. Automated exposure control, iterative reconstruction, and/or weight based adjustment of the mA/kV was utilized to reduce the radiation dose to as low as reasonably achievable. COMPARISON: CT chest dated 04/21/2022. Chest x-ray dated 06/07/2022 HISTORY: ORDERING SYSTEM PROVIDED HISTORY: sob, hypoxia, tachycardia TECHNOLOGIST PROVIDED HISTORY: sob, hypoxia, tachycardia Decision Support Exception - unselect if not a suspected or confirmed emergency medical condition->Emergency Medical Condition (MA) Reason for Exam: sob, hypoxia, tachycardia FINDINGS: Pulmonary Arteries: Pulmonary arteries are adequately opacified for evaluation. No evidence of intraluminal filling defect to suggest pulmonary embolism. Main pulmonary arteries are mildly dilated. Mediastinum: No evidence of mediastinal lymphadenopathy. The heart and pericardium demonstrate no acute abnormality. There is no acute abnormality of the thoracic aorta. Lungs/pleura: Detailed assessment is mildly limited by respiratory motion. There is mild centrilobular emphysema. There is mild diffuse bronchial wall thickening. No focal airspace consolidation, pneumothorax, or pleural effusion.   A couple of tiny 0.3 cm areas of nodularity are either unchanged from the previous study or obscured by respiratory motion on today's study. No new or enlarging pulmonary nodule. Abdominal organs: Tiny cyst noted in the central liver. Liver is otherwise unremarkable. Gallbladder, spleen, pancreas, and adrenal glands are unremarkable. There is an exophytic lesion at the anterior mid left kidney, measuring 3.7 x 3.3 cm, which is slightly more dense than expected for a simple cyst.  There is symmetric enhancement of the kidneys. No hydronephrosis or perinephric inflammation. GI/bowel: There is scattered colonic diverticulosis. There is no abnormal bowel distention or pericolonic inflammation. No free intraperitoneal air or fluid. Appendix is normal. Pelvis: Urinary bladder is within normal limits. No pelvic lymphadenopathy. Atherosclerosis noted in the iliac arteries. Peritoneum/retroperitoneum: The abdominal aorta is normal in caliber. There is no retroperitoneal or mesenteric lymphadenopathy. There is mild-to-moderate atherosclerosis throughout the abdominal aorta and major visceral arteries. Soft Tissues/Bones: No acute bone or soft tissue abnormality. 1.  No evidence of acute pulmonary embolism. 2.  No acute intrathoracic process. 3.  Mild centrilobular emphysema and diffuse bronchial wall thickening, suggesting bronchitis, likely chronic. 4.  No acute process in the abdomen or pelvis. 5.  Exophytic 3.7 cm lesion at the mid left kidney is slightly more dense than expected for a simple cyst, most likely a cyst with proteinaceous/hemorrhagic contents. When compared to an unenhanced CT chest dated 04/21/2022, there is no appreciable enhancement. Consultations:    Consults:     Final Specialist Recommendations/Findings:   IP CONSULT TO INTERNAL MEDICINE  IP CONSULT TO UROLOGY      The patient was seen and examined on day of discharge and this discharge summary is in conjunction with any daily progress note from day of discharge.     Discharge plan:       Disposition: Home    Physician Follow Up:     MD [address] [place] [address]  [phone]    In 1 week      Jake Quiroz MD  [place] [id number]  [phone]    In 1 week         Requiring Further Evaluation/Follow Up POST HOSPITALIZATION/Incidental Findings:     Diet: regular diet    Activity: As tolerated    Instructions to Patient:   Take Zithromax for 2 more days after discharge, and finish course of steroids for 5 more days    Discharge Medications:      Medication List      START taking these medications    azithromycin 250 MG tablet  Commonly known as: ZITHROMAX  Take 1 tablet by mouth daily for 2 doses     predniSONE 20 MG tablet  Commonly known as: DELTASONE  Take 1 tablet by mouth daily for 5 days        CONTINUE taking these medications    albuterol (2.5 MG/3ML) 0.083% nebulizer solution  Commonly known as: PROVENTIL  Take 3 mLs by nebulization 4 times daily     amLODIPine 10 MG tablet  Commonly known as: NORVASC  Take 1 tablet by mouth daily     escitalopram 10 MG tablet  Commonly known as: LEXAPRO  Take 1 tablet by mouth daily     folic acid 1 MG tablet  Commonly known as: FOLVITE  Take 1 tablet by mouth daily     Spiriva HandiHaler 18 MCG inhalation capsule  Generic drug: tiotropium  Inhale 1 capsule into the lungs daily     Symbicort 160-4.5 MCG/ACT Aero  Generic drug: budesonide-formoterol  Inhale 2 puffs into the lungs daily     thiamine 100 MG tablet  Take 1 tablet by mouth daily     traZODone 50 MG tablet  Commonly known as: DESYREL  Take 1 tablet by mouth nightly     vitamin D 1.25 MG (33654 UT) Caps capsule  Commonly known as: ERGOCALCIFEROL  Take 1 capsule by mouth once a week           Where to Get Your Medications      These medications were sent to [facility], [address]    Phone: [phone]   · azithromycin 250 MG tablet  · predniSONE 20 MG tablet Time Spent on discharge is  32 mins in patient examination, evaluation, counseling as well as medication reconciliation, prescriptions for required medications, discharge plan and follow up. Electronically signed by   Johann Tirado MD  6/25/2022  6:56 PM      Thank you Dr. Ric Rosas MD for the opportunity to be involved in this patient's care.

## 2022-06-27 LAB
EKG ATRIAL RATE: 92 BPM
EKG P-R INTERVAL: 148 MS
EKG Q-T INTERVAL: 330 MS
EKG QRS DURATION: 84 MS
EKG QTC CALCULATION (BAZETT): 408 MS
EKG R AXIS: 144 DEGREES
EKG T AXIS: 147 DEGREES
EKG VENTRICULAR RATE: 92 BPM

## 2023-01-03 ENCOUNTER — APPOINTMENT (OUTPATIENT)
Dept: GENERAL RADIOLOGY | Age: 58
DRG: 140 | End: 2023-01-03
Payer: COMMERCIAL

## 2023-01-03 ENCOUNTER — HOSPITAL ENCOUNTER (INPATIENT)
Age: 58
LOS: 16 days | Discharge: NURSING, REHAB OR LONG-TERM CARE | DRG: 140 | End: 2023-01-19
Attending: EMERGENCY MEDICINE | Admitting: INTERNAL MEDICINE
Payer: COMMERCIAL

## 2023-01-03 DIAGNOSIS — U07.1 PNEUMONIA DUE TO COVID-19 VIRUS: ICD-10-CM

## 2023-01-03 DIAGNOSIS — J96.22 ACUTE ON CHRONIC RESPIRATORY FAILURE WITH HYPOXIA AND HYPERCAPNIA (HCC): Primary | ICD-10-CM

## 2023-01-03 DIAGNOSIS — J12.82 PNEUMONIA DUE TO COVID-19 VIRUS: ICD-10-CM

## 2023-01-03 DIAGNOSIS — J96.21 ACUTE ON CHRONIC RESPIRATORY FAILURE WITH HYPOXIA AND HYPERCAPNIA (HCC): Primary | ICD-10-CM

## 2023-01-03 LAB
ABSOLUTE BANDS #: 0.55 K/UL (ref 0–1)
ABSOLUTE EOS #: 0.05 K/UL (ref 0–0.4)
ABSOLUTE LYMPH #: 0.74 K/UL (ref 1–4.8)
ABSOLUTE MONO #: 0.78 K/UL (ref 0.1–1.3)
ALLEN TEST: ABNORMAL
ALLEN TEST: ABNORMAL
ANION GAP SERPL CALCULATED.3IONS-SCNC: ABNORMAL MMOL/L (ref 9–17)
BANDS: 12 % (ref 0–10)
BASOPHILS # BLD: 0 % (ref 0–2)
BASOPHILS ABSOLUTE: 0 K/UL (ref 0–0.2)
BUN BLDV-MCNC: 15 MG/DL (ref 6–20)
C-REACTIVE PROTEIN: 122.4 MG/L (ref 0–5)
CALCIUM SERPL-MCNC: 9.4 MG/DL (ref 8.6–10.4)
CARBOXYHEMOGLOBIN: 1.1 % (ref 0–5)
CARBOXYHEMOGLOBIN: 1.2 %
CHLORIDE BLD-SCNC: 90 MMOL/L (ref 98–107)
CO2: >45 MMOL/L (ref 20–31)
CREAT SERPL-MCNC: 0.51 MG/DL (ref 0.7–1.2)
EOSINOPHILS RELATIVE PERCENT: 1 % (ref 0–4)
FIO2: 40
GFR SERPL CREATININE-BSD FRML MDRD: >60 ML/MIN/1.73M2
GLUCOSE BLD-MCNC: 274 MG/DL (ref 70–99)
HCO3 ARTERIAL: 47.4 MMOL/L
HCT VFR BLD CALC: 34.7 % (ref 41–53)
HEMOGLOBIN: 11 G/DL (ref 13.5–17.5)
INFLUENZA A: NOT DETECTED
INFLUENZA B: NOT DETECTED
LYMPHOCYTES # BLD: 16 % (ref 24–44)
MCH RBC QN AUTO: 30.3 PG (ref 26–34)
MCHC RBC AUTO-ENTMCNC: 31.6 G/DL (ref 31–37)
MCV RBC AUTO: 95.9 FL (ref 80–100)
METHEMOGLOBIN: 0.9 % (ref 0–1.9)
METHEMOGLOBIN: 1.1 %
MODE: ABNORMAL
MODE: ABNORMAL
MONOCYTES # BLD: 17 % (ref 1–7)
MORPHOLOGY: NORMAL
O2 DEVICE/FLOW/%: ABNORMAL
O2 DEVICE/FLOW/%: ABNORMAL
O2 SAT, ARTERIAL: 91.2 %
O2 SAT, ARTERIAL: 96.9 % (ref 95–98)
PATIENT TEMP: 37
PATIENT TEMP: 37
PCO2 ARTERIAL: 92.8 MMHG
PCO2 ARTERIAL: ABNORMAL MMHG (ref 35–45)
PDW BLD-RTO: 12.8 % (ref 11.5–14.9)
PH ARTERIAL: 7.21 (ref 7.35–7.45)
PH ARTERIAL: 7.32
PLATELET # BLD: 182 K/UL (ref 150–450)
PMV BLD AUTO: 6.1 FL (ref 6–12)
PO2 ARTERIAL: 170 MMHG (ref 80–100)
PO2 ARTERIAL: 67.8 MMHG
POSITIVE BASE EXCESS, ART: 21.2 MMOL/L (ref 0–2)
POTASSIUM SERPL-SCNC: 4.3 MMOL/L (ref 3.7–5.3)
PRO-BNP: 88 PG/ML
PROCALCITONIN: 0.14 NG/ML
PT. POSITION: ABNORMAL
PT. POSITION: ABNORMAL
RBC # BLD: 3.62 M/UL (ref 4.5–5.9)
RESPIRATORY RATE: 16
RESPIRATORY RATE: 20
SAMPLE SITE: ABNORMAL
SAMPLE SITE: ABNORMAL
SARS-COV-2 RNA, RT PCR: DETECTED
SEG NEUTROPHILS: 54 % (ref 36–66)
SEGMENTED NEUTROPHILS ABSOLUTE COUNT: 2.48 K/UL (ref 1.3–9.1)
SODIUM BLD-SCNC: 139 MMOL/L (ref 135–144)
SOURCE: ABNORMAL
SPECIMEN DESCRIPTION: ABNORMAL
TROPONIN, HIGH SENSITIVITY: 8 NG/L (ref 0–22)
WBC # BLD: 4.6 K/UL (ref 3.5–11)

## 2023-01-03 PROCEDURE — 6360000002 HC RX W HCPCS: Performed by: INTERNAL MEDICINE

## 2023-01-03 PROCEDURE — 94761 N-INVAS EAR/PLS OXIMETRY MLT: CPT

## 2023-01-03 PROCEDURE — 86140 C-REACTIVE PROTEIN: CPT

## 2023-01-03 PROCEDURE — 93005 ELECTROCARDIOGRAM TRACING: CPT | Performed by: EMERGENCY MEDICINE

## 2023-01-03 PROCEDURE — 82805 BLOOD GASES W/O2 SATURATION: CPT

## 2023-01-03 PROCEDURE — 85025 COMPLETE CBC W/AUTO DIFF WBC: CPT

## 2023-01-03 PROCEDURE — 80048 BASIC METABOLIC PNL TOTAL CA: CPT

## 2023-01-03 PROCEDURE — 2060000000 HC ICU INTERMEDIATE R&B

## 2023-01-03 PROCEDURE — 36415 COLL VENOUS BLD VENIPUNCTURE: CPT

## 2023-01-03 PROCEDURE — 84145 PROCALCITONIN (PCT): CPT

## 2023-01-03 PROCEDURE — 99285 EMERGENCY DEPT VISIT HI MDM: CPT

## 2023-01-03 PROCEDURE — 71045 X-RAY EXAM CHEST 1 VIEW: CPT

## 2023-01-03 PROCEDURE — 87636 SARSCOV2 & INF A&B AMP PRB: CPT

## 2023-01-03 PROCEDURE — 84484 ASSAY OF TROPONIN QUANT: CPT

## 2023-01-03 PROCEDURE — 36600 WITHDRAWAL OF ARTERIAL BLOOD: CPT

## 2023-01-03 PROCEDURE — 2580000003 HC RX 258: Performed by: INTERNAL MEDICINE

## 2023-01-03 PROCEDURE — 83880 ASSAY OF NATRIURETIC PEPTIDE: CPT

## 2023-01-03 PROCEDURE — 94660 CPAP INITIATION&MGMT: CPT

## 2023-01-03 RX ORDER — SODIUM CHLORIDE 0.9 % (FLUSH) 0.9 %
5-40 SYRINGE (ML) INJECTION PRN
Status: DISCONTINUED | OUTPATIENT
Start: 2023-01-03 | End: 2023-01-19 | Stop reason: HOSPADM

## 2023-01-03 RX ORDER — GAUZE BANDAGE 2" X 2"
100 BANDAGE TOPICAL DAILY
Status: DISCONTINUED | OUTPATIENT
Start: 2023-01-04 | End: 2023-01-19 | Stop reason: HOSPADM

## 2023-01-03 RX ORDER — ACETAMINOPHEN 325 MG/1
650 TABLET ORAL EVERY 6 HOURS PRN
Status: DISCONTINUED | OUTPATIENT
Start: 2023-01-03 | End: 2023-01-19 | Stop reason: HOSPADM

## 2023-01-03 RX ORDER — ENOXAPARIN SODIUM 100 MG/ML
40 INJECTION SUBCUTANEOUS DAILY
Status: DISCONTINUED | OUTPATIENT
Start: 2023-01-04 | End: 2023-01-03 | Stop reason: SDUPTHER

## 2023-01-03 RX ORDER — FOLIC ACID 1 MG/1
1 TABLET ORAL DAILY
Status: DISCONTINUED | OUTPATIENT
Start: 2023-01-04 | End: 2023-01-19 | Stop reason: HOSPADM

## 2023-01-03 RX ORDER — METHYLPREDNISOLONE SODIUM SUCCINATE 40 MG/ML
40 INJECTION, POWDER, LYOPHILIZED, FOR SOLUTION INTRAMUSCULAR; INTRAVENOUS EVERY 6 HOURS
Status: DISCONTINUED | OUTPATIENT
Start: 2023-01-03 | End: 2023-01-03

## 2023-01-03 RX ORDER — IPRATROPIUM BROMIDE AND ALBUTEROL SULFATE 2.5; .5 MG/3ML; MG/3ML
1 SOLUTION RESPIRATORY (INHALATION)
Status: DISCONTINUED | OUTPATIENT
Start: 2023-01-04 | End: 2023-01-04

## 2023-01-03 RX ORDER — ONDANSETRON 4 MG/1
4 TABLET, ORALLY DISINTEGRATING ORAL EVERY 8 HOURS PRN
Status: DISCONTINUED | OUTPATIENT
Start: 2023-01-03 | End: 2023-01-19 | Stop reason: HOSPADM

## 2023-01-03 RX ORDER — METHYLPREDNISOLONE SODIUM SUCCINATE 40 MG/ML
40 INJECTION, POWDER, LYOPHILIZED, FOR SOLUTION INTRAMUSCULAR; INTRAVENOUS EVERY 6 HOURS
Status: DISCONTINUED | OUTPATIENT
Start: 2023-01-03 | End: 2023-01-05

## 2023-01-03 RX ORDER — ONDANSETRON 2 MG/ML
4 INJECTION INTRAMUSCULAR; INTRAVENOUS EVERY 6 HOURS PRN
Status: DISCONTINUED | OUTPATIENT
Start: 2023-01-03 | End: 2023-01-19 | Stop reason: HOSPADM

## 2023-01-03 RX ORDER — ACETAMINOPHEN 650 MG/1
650 SUPPOSITORY RECTAL EVERY 6 HOURS PRN
Status: DISCONTINUED | OUTPATIENT
Start: 2023-01-03 | End: 2023-01-19 | Stop reason: HOSPADM

## 2023-01-03 RX ORDER — BUDESONIDE AND FORMOTEROL FUMARATE DIHYDRATE 160; 4.5 UG/1; UG/1
2 AEROSOL RESPIRATORY (INHALATION) DAILY
Status: DISCONTINUED | OUTPATIENT
Start: 2023-01-04 | End: 2023-01-19 | Stop reason: HOSPADM

## 2023-01-03 RX ORDER — SODIUM CHLORIDE 0.9 % (FLUSH) 0.9 %
5-40 SYRINGE (ML) INJECTION EVERY 12 HOURS SCHEDULED
Status: DISCONTINUED | OUTPATIENT
Start: 2023-01-03 | End: 2023-01-19 | Stop reason: HOSPADM

## 2023-01-03 RX ORDER — ALBUTEROL SULFATE 2.5 MG/3ML
2.5 SOLUTION RESPIRATORY (INHALATION)
Status: DISCONTINUED | OUTPATIENT
Start: 2023-01-03 | End: 2023-01-04

## 2023-01-03 RX ORDER — ENOXAPARIN SODIUM 100 MG/ML
40 INJECTION SUBCUTANEOUS DAILY
Status: DISCONTINUED | OUTPATIENT
Start: 2023-01-03 | End: 2023-01-19 | Stop reason: HOSPADM

## 2023-01-03 RX ORDER — SODIUM CHLORIDE 9 MG/ML
INJECTION, SOLUTION INTRAVENOUS CONTINUOUS
Status: DISCONTINUED | OUTPATIENT
Start: 2023-01-03 | End: 2023-01-06

## 2023-01-03 RX ORDER — POLYETHYLENE GLYCOL 3350 17 G/17G
17 POWDER, FOR SOLUTION ORAL DAILY PRN
Status: DISCONTINUED | OUTPATIENT
Start: 2023-01-03 | End: 2023-01-19 | Stop reason: HOSPADM

## 2023-01-03 RX ORDER — AMLODIPINE BESYLATE 10 MG/1
10 TABLET ORAL DAILY
Status: DISCONTINUED | OUTPATIENT
Start: 2023-01-04 | End: 2023-01-19 | Stop reason: HOSPADM

## 2023-01-03 RX ORDER — SODIUM CHLORIDE 9 MG/ML
INJECTION, SOLUTION INTRAVENOUS PRN
Status: DISCONTINUED | OUTPATIENT
Start: 2023-01-03 | End: 2023-01-19 | Stop reason: HOSPADM

## 2023-01-03 RX ORDER — ALBUTEROL SULFATE 90 UG/1
2 AEROSOL, METERED RESPIRATORY (INHALATION)
Status: DISCONTINUED | OUTPATIENT
Start: 2023-01-03 | End: 2023-01-04

## 2023-01-03 RX ORDER — IPRATROPIUM BROMIDE AND ALBUTEROL SULFATE 2.5; .5 MG/3ML; MG/3ML
1 SOLUTION RESPIRATORY (INHALATION)
Status: DISCONTINUED | OUTPATIENT
Start: 2023-01-03 | End: 2023-01-03

## 2023-01-03 RX ADMIN — ENOXAPARIN SODIUM 40 MG: 100 INJECTION SUBCUTANEOUS at 20:15

## 2023-01-03 RX ADMIN — AZITHROMYCIN DIHYDRATE 500 MG: 500 INJECTION, POWDER, LYOPHILIZED, FOR SOLUTION INTRAVENOUS at 20:13

## 2023-01-03 RX ADMIN — METHYLPREDNISOLONE SODIUM SUCCINATE 40 MG: 40 INJECTION, POWDER, LYOPHILIZED, FOR SOLUTION INTRAMUSCULAR; INTRAVENOUS at 22:56

## 2023-01-03 RX ADMIN — CEFEPIME 2000 MG: 2 INJECTION, POWDER, FOR SOLUTION INTRAVENOUS at 19:34

## 2023-01-03 ASSESSMENT — PAIN - FUNCTIONAL ASSESSMENT: PAIN_FUNCTIONAL_ASSESSMENT: NONE - DENIES PAIN

## 2023-01-03 ASSESSMENT — LIFESTYLE VARIABLES
HOW OFTEN DO YOU HAVE A DRINK CONTAINING ALCOHOL: NEVER
HOW MANY STANDARD DRINKS CONTAINING ALCOHOL DO YOU HAVE ON A TYPICAL DAY: PATIENT DOES NOT DRINK

## 2023-01-03 NOTE — ED PROVIDER NOTES
[address] ED  eMERGENCY dEPARTMENT eNCOUnter   Attending Attestation     Pt Name: Rashad Michael  MRN: [id number]  [place] 1965  Date of evaluation: 1/3/23       Rashad Michael is a 61 y.o. male who presents with Respiratory Distress      History:   Patient was brought in by EMS for respiratory distress. Patient has a history of COPD has been having shortness of breath for the last couple days but has not been really taking his treatment like he should. Patient denies chest pain or fevers. Patient was placed on CPAP given mag and Solu-Medrol in route and states he is feeling better. On our BiPAP patient seems to be doing much better with oxygen levels in the mid to high 90s. Exam: Vitals:   Vitals:    01/03/23 1702   BP: (!) 144/87   Pulse: (!) 117   Resp: 17   Temp: 97.7 °F (36.5 °C)   TempSrc: Axillary   SpO2: (!) 88%   Weight: 154 lb (69.9 kg)   Height: 5' 7\" (1.702 m)     Respiratory distress tachycardic no murmurs lungs are diminished significantly bilaterally. CRITICAL CARE: There was a high probability of clinically significant/life threatening deterioration in this patient's condition which required my urgent intervention. Total critical care time was 30 minutes. This excludes any time for separately reportable procedures. I performed a history and physical examination of the patient and discussed management with the resident. I reviewed the residents note and agree with the documented findings and plan of care. Any areas of disagreement are noted on the chart. I was personally present for the key portions of any procedures. I have documented in the chart those procedures where I was not present during the key portions. I have personally reviewed all images and agree with the resident's interpretation. I have reviewed the emergency nurses triage note.  I agree with the chief complaint, past medical history, past surgical history, allergies, medications, social and family history as documented unless otherwise noted below. Documentation of the HPI, Physical Exam and Medical Decision Making performed by medical students or scribes is based on my personal performance of the HPI, PE and MDM. I personally evaluated and examined the patient in conjunction with the APC and agree with the assessment, treatment plan, and disposition of the patient as recorded by the APC. Additional findings are as noted.     Tino Angel MD  Attending Emergency  Physician              Cecelia Chan MD  01/03/23 0405

## 2023-01-03 NOTE — ED NOTES
Pt's spo2 at 83% with a good waveform. Pt reports feeling SOB again even with position readjustment.  Emergency MD notified and respiratory called to bedside      Jake Pereira RN  01/03/23 0400

## 2023-01-03 NOTE — ED PROVIDER NOTES
[facility]  Emergency Department Encounter  Emergency Medicine Resident     Pt Sergio Morley  MRN: [id number]  [place] 1965  Date of evaluation: 1/3/23  PCP:  Dominick Lerma MD      [address] [place] [address]       Chief Complaint   Patient presents with    Respiratory Distress       HISTORY OF PRESENT ILLNESS  (Location/Symptom, Timing/Onset, Context/Setting, Quality, Duration, Modifying Factors, Severity.)      Noa Del Rosario is a 61 y.o. male who presents with EMS due to respiratory distress. Patient has a history of COPD, has not been using his medication at home as he is prescribed. States that he started having increasing shortness of breath over the last day, denies any chest pain or fevers or cough. Patient was placed on CPAP per EMS given 125 of Solu-Medrol as well as 2 g of mag and 2 breathing treatments. His oxygen saturations were not rising above 90 per EMS. Does state he is on 5 L nasal cannula at home. PAST MEDICAL / SURGICAL / SOCIAL / FAMILY HISTORY      has no past medical history on file. COPD     has no past surgical history on file.       Social History     Socioeconomic History    Marital status: Single     Spouse name: Not on file    Number of children: Not on file    Years of education: Not on file    Highest education level: Not on file   Occupational History    Not on file   Tobacco Use    Smoking status: Every Day     Packs/day: 1.00     Years: 43.00     Pack years: 43.00     Types: Cigarettes    Smokeless tobacco: Never   Substance and Sexual Activity    Alcohol use: Yes     Comment: social    Drug use: Not Currently    Sexual activity: Not on file   Other Topics Concern    Not on file   Social History Narrative    Not on file     Social Determinants of Health     Financial Resource Strain: Low Risk     Difficulty of Paying Living Expenses: Not hard at all   Food Insecurity: No Food Insecurity    Worried About [address] in the Last Year: Never true    Sabra of [employer] in the Last Year: Never true   Transportation Needs: Not on file   Physical Activity: Not on file   Stress: Not on file   Social Connections: Not on file   Intimate Partner Violence: Not on file   Housing Stability: Not on file       History reviewed. No pertinent family history. Allergies:  Patient has no known allergies. Home Medications:  Prior to Admission medications    Medication Sig Start Date End Date Taking? Authorizing Provider   escitalopram (LEXAPRO) 10 MG tablet Take 1 tablet by mouth daily  Patient not taking: Reported on 1/3/2023 6/22/22   Charles Ragsdale MD   folic acid (FOLVITE) 1 MG tablet Take 1 tablet by mouth daily 6/22/22   Charles Ragsdale MD   SYMBICORT 160-4.5 MCG/ACT AERO Inhale 2 puffs into the lungs daily 6/22/22   Charles Ragsdale MD   thiamine 100 MG tablet Take 1 tablet by mouth daily 6/22/22   Charles Ragsdale MD   tiotropium (Bret Theodore) 18 MCG inhalation capsule Inhale 1 capsule into the lungs daily 6/22/22   Charles Ragsdale MD   traZODone (DESYREL) 50 MG tablet Take 1 tablet by mouth nightly  Patient not taking: Reported on 1/3/2023 6/22/22   Charles Ragsdale MD   vitamin D (ERGOCALCIFEROL) 1.25 MG (57406 UT) CAPS capsule Take 1 capsule by mouth once a week 6/22/22   Charles Ragsdale MD   albuterol (PROVENTIL) (2.5 MG/3ML) 0.083% nebulizer solution Take 3 mLs by nebulization 4 times daily 6/7/22   Sully Castro DO   amLODIPine (NORVASC) 10 MG tablet Take 1 tablet by mouth daily 3/25/22   Charles Ragsdale MD       REVIEW OF SYSTEMS    (2-9 systems for level 4, 10 or more for level 5)      Review of Systems   Constitutional:  Negative for activity change, chills and fever. HENT:  Negative for congestion. Respiratory:  Positive for cough, chest tightness and shortness of breath. Cardiovascular:  Negative for chest pain. Gastrointestinal:  Negative for abdominal distention, abdominal pain, nausea and vomiting.    Neurological:  Negative for dizziness, syncope, weakness and headaches. PHYSICAL EXAM   (up to 7 for level 4, 8 or more for level 5)      INITIAL VITALS:   /84   Pulse (!) 103   Temp 97.7 °F (36.5 °C) (Axillary)   Resp (!) 31   Ht 5' 7\" (1.702 m)   Wt 154 lb (69.9 kg)   SpO2 96%   BMI 24.12 kg/m²     Physical Exam  Constitutional:       General: He is awake. Appearance: Normal appearance. HENT:      Head: Normocephalic and atraumatic. Right Ear: External ear normal.      Left Ear: External ear normal.      Nose: Nose normal.   Eyes:      General: Lids are normal.      Extraocular Movements: Extraocular movements intact. Cardiovascular:      Rate and Rhythm: Normal rate. Pulses: Normal pulses. Heart sounds: Normal heart sounds. Pulmonary:      Effort: Pulmonary effort is normal.      Comments: Tachypneic, tachycardic, using accessory muscles, diminished breath sounds bilaterally  Abdominal:      Palpations: Abdomen is soft. Tenderness: There is no abdominal tenderness. Musculoskeletal:      Cervical back: Normal range of motion. Right lower leg: No edema. Left lower leg: No edema. Comments: Normal range of motion, strength and sensation intact in all extremities. Neurological:      Mental Status: He is alert and oriented to person, place, and time. Sensory: Sensation is intact. Motor: Motor function is intact. Psychiatric:         Mood and Affect: Mood normal.         Behavior: Behavior is cooperative. DIFFERENTIAL  DIAGNOSIS/ MDM/ ED COURSE     PLAN (LABS / IMAGING / EKG):  Orders Placed This Encounter   Procedures    COVID-19 & Influenza Combo    XR CHEST PORTABLE    Basic Metabolic Panel    CBC with Auto Differential    Arterial Blood Gases    Brain Natriuretic Peptide    Troponin    BLOOD GAS, ARTERIAL    C-Reactive Protein    Procalcitonin    Basic Metabolic Panel w/ Reflex to MG    CBC with Auto Differential    ADULT DIET;  Regular    Vital signs per unit routine Admission/Observation order previously placed    Notify physician    Up with assistance    Daily weights    Intake and output    Full Code    Inpatient consult to Pulmonology    Inpatient consult to Primary Care Provider    Inpatient consult to Social Work    OT eval and treat    PT evaluation and treat    Respiratory care evaluation only    Pulse Oximetry Spot Check    EKG 12 Lead    Insert peripheral IV    ADMIT TO INPATIENT       MEDICATIONS ORDERED:  Orders Placed This Encounter   Medications    DISCONTD: albuterol-ipratropium (COMBIVENT RESPIMAT)  MCG/ACT inhaler 1 puff     Order Specific Question:   Initiate RT Bronchodilator Protocol     Answer:   Yes - Inpatient Protocol    DISCONTD: methylPREDNISolone sodium (SOLU-MEDROL) injection 40 mg    DISCONTD: ipratropium-albuterol (DUONEB) nebulizer solution 1 ampule     Order Specific Question:   Initiate RT Bronchodilator Protocol     Answer:   Yes - Inpatient Protocol    DISCONTD: methylPREDNISolone sodium (SOLU-MEDROL) injection 40 mg    enoxaparin (LOVENOX) injection 40 mg     Order Specific Question:   Indication of Use     Answer:   Prophylaxis-DVT/PE    DISCONTD: cefepime (MAXIPIME) 1,000 mg in sodium chloride 0.9 % 50 mL IVPB (mini-bag)     Order Specific Question:   Antimicrobial Indications     Answer:   Sepsis of Unknown Etiology     Order Specific Question:   Sepsis duration of therapy     Answer:   7 days     Order Specific Question:   Suspected Organism(s)     Answer:   Jonny Suarez Patient has a bandemia Recently hospitalized 4 weeks ago at [facility].    azithromycin (ZITHROMAX) 500 mg in D5W 250ml addavial     Order Specific Question:   Antimicrobial Indications     Answer:   COPD Exacerbation     Order Specific Question:   COPD exacerbation duration of therapy     Answer:   5 days    FOLLOWED BY Linked Order Group     cefepime (MAXIPIME) 2,000 mg in sodium chloride 0.9 % 50 mL IVPB mini-bag      Order Specific Question:   Antimicrobial Indications      Answer:   Sepsis of Unknown Etiology      Order Specific Question:   Sepsis duration of therapy      Answer:   7 days      Order Specific Question:   Suspected Organism(s)      Answer:   Unclear Patient has a bandemia Recently hospitalized 4 weeks ago at [facility].     cefepime (MAXIPIME) 2,000 mg in sodium chloride 0.9 % 50 mL IVPB mini-bag      Order Specific Question:   Antimicrobial Indications      Answer:   Sepsis of Unknown Etiology      Order Specific Question:   Sepsis duration of therapy      Answer:   7 days      Order Specific Question:   Suspected Organism(s)      Answer:   Unclear Patient has a bandemia Recently hospitalized 4 weeks ago at [address] [place] [address] Order Group     albuterol sulfate HFA (PROVENTIL;VENTOLIN;PROAIR) 108 (90 Base) MCG/ACT inhaler 2 puff      Order Specific Question:   Initiate RT Bronchodilator Protocol      Answer:   Yes - Inpatient Protocol     ipratropium (ATROVENT HFA) 17 MCG/ACT inhaler 2 puff      Order Specific Question:   Initiate RT Bronchodilator Protocol      Answer:   Yes - Inpatient Protocol    amLODIPine (NORVASC) tablet 10 mg    folic acid (FOLVITE) tablet 1 mg    budesonide-formoterol (SYMBICORT) 160-4.5 MCG/ACT inhaler 2 puff    thiamine mononitrate tablet 100 mg    sodium chloride flush 0.9 % injection 5-40 mL    sodium chloride flush 0.9 % injection 5-40 mL    0.9 % sodium chloride infusion    OR Linked Order Group     ondansetron (ZOFRAN-ODT) disintegrating tablet 4 mg     ondansetron (ZOFRAN) injection 4 mg    polyethylene glycol (GLYCOLAX) packet 17 g    DISCONTD: enoxaparin (LOVENOX) injection 40 mg     Order Specific Question:   Indication of Use     Answer:   Prophylaxis-DVT/PE    OR Linked Order Group     acetaminophen (TYLENOL) tablet 650 mg     acetaminophen (TYLENOL) suppository 650 mg    0.9 % sodium chloride infusion    albuterol (PROVENTIL) nebulizer solution 2.5 mg     Order Specific Question: Initiate RT Bronchodilator Protocol     Answer:   Yes - Inpatient Protocol    ipratropium-albuterol (DUONEB) nebulizer solution 1 ampule     Order Specific Question:   Initiate RT Bronchodilator Protocol     Answer:   Yes - Inpatient Protocol    methylPREDNISolone sodium (SOLU-MEDROL) injection 40 mg       DDX: COPD exacerbation, CHF exacerbation, COVID-19, influenza, pneumonia,    Medical Decision Making  69-year-old male in respiratory distress presented per EMS. Was given 125 Solu-Medrol per EMS, 2 g of mag, albuterol and Combivent. On arrival patient saturating in the low 80s, placed on BiPAP with improvement to the low 90s. Will obtain cardiac work-up, COVID and flu swab to evaluate for possible CHF exacerbation, influenza and COVID. RT is consulted for breathing treatments, patient has diminished breath sounds bilaterally. ABG shows undetectable CO2, will continue patient on BiPAP and reevaluate. Patient was found to be COVID-positive. Patient will be admitted for COPD exacerbation. Upon further for chart review patient is found to have been recently admitted due to COVID-19. Amount and/or Complexity of Data Reviewed  External Data Reviewed: labs and radiology. Labs: ordered. Decision-making details documented in ED Course. Radiology: ordered. Decision-making details documented in ED Course. ECG/medicine tests: ordered. Decision-making details documented in ED Course. Discussion of management or test interpretation with external provider(s): Discussed patient care with pulmonology, Dr. Tuan Lopes. Discussed patient care with Dr. Mya Whitaker. Risk  Decision regarding hospitalization.           ED Course as of 01/04/23 0052 Tue Jan 03, 2023   1706 EMS gave:  125 solumedrol  2g mag  Albuterol  Combuvent  [SS]   1706 Improved to 94% saturation on CPAP [SS]   1759 SARS-CoV-2 RNA, RT PCR(!): DETECTED [SS]   1759 CXR unremarkable.  [SS]   4232 Satting at 88%, will have RT give patient another breathing treatment. [SS]      ED Course User Index  [SS] Wannetta Najjar, MD       DIAGNOSTIC RESULTS    LAB RESULTS:  Results for orders placed or performed during the hospital encounter of 01/03/23   COVID-19 & Influenza Combo    Specimen: Nasopharyngeal Swab   Result Value Ref Range    Specimen Description . NASOPHARYNGEAL SWAB     Source . NASOPHARYNGEAL SWAB     SARS-CoV-2 RNA, RT PCR DETECTED (A) Not Detected    INFLUENZA A Not Detected Not Detected    INFLUENZA B Not Detected Not Detected   Basic Metabolic Panel   Result Value Ref Range    Glucose 274 (H) 70 - 99 mg/dL    BUN 15 6 - 20 mg/dL    Creatinine 0.51 (L) 0.70 - 1.20 mg/dL    Est, Glom Filt Rate >60 >60 mL/min/1.73m2    Calcium 9.4 8.6 - 10.4 mg/dL    Sodium 139 135 - 144 mmol/L    Potassium 4.3 3.7 - 5.3 mmol/L    Chloride 90 (L) 98 - 107 mmol/L    CO2 >45 (HH) 20 - 31 mmol/L    Anion Gap Unable to calculate anion gap due to CO2 >45. 9 - 17 mmol/L   CBC with Auto Differential   Result Value Ref Range    WBC 4.6 3.5 - 11.0 k/uL    RBC 3.62 (L) 4.5 - 5.9 m/uL    Hemoglobin 11.0 (L) 13.5 - 17.5 g/dL    Hematocrit 34.7 (L) 41 - 53 %    MCV 95.9 80 - 100 fL    MCH 30.3 26 - 34 pg    MCHC 31.6 31 - 37 g/dL    RDW 12.8 11.5 - 14.9 %    Platelets 365 017 - 907 k/uL    MPV 6.1 6.0 - 12.0 fL    Seg Neutrophils 54 36 - 66 %    Lymphocytes 16 (L) 24 - 44 %    Monocytes 17 (H) 1 - 7 %    Eosinophils % 1 0 - 4 %    Basophils 0 0 - 2 %    Bands 12 (H) 0 - 10 %    Segs Absolute 2.48 1.3 - 9.1 k/uL    Absolute Lymph # 0.74 (L) 1.0 - 4.8 k/uL    Absolute Mono # 0.78 0.1 - 1.3 k/uL    Absolute Eos # 0.05 0.0 - 0.4 k/uL    Basophils Absolute 0.00 0.0 - 0.2 k/uL    Absolute Bands # 0.55 0.0 - 1.0 k/uL    Morphology Normal    Arterial Blood Gases   Result Value Ref Range    pH, Arterial 7.209 (LL) 7.350 - 7.450    pCO2, Arterial VALUE ABOVE REPORTABLE RANGE 35.0 - 45.0 mmHg    pO2, Arterial 170.0 (H) 80.0 - 100.0 mmHg    O2 Sat, Arterial 96.9 95 - 98 %    Carboxyhemoglobin 1.1 0 - 5 %    Methemoglobin 0.9 0.0 - 1.9 %    Pt Temp 37.0     O2 Device/Flow/% BIPAP     Respiratory Rate 16     Celestino Test PASS     Sample Site Right Radial Artery     Pt. Position SEMI-FOWLERS     Mode BIPAP 16/8    Brain Natriuretic Peptide   Result Value Ref Range    Pro-BNP 88 <300 pg/mL   Troponin   Result Value Ref Range    Troponin, High Sensitivity 8 0 - 22 ng/L   BLOOD GAS, ARTERIAL   Result Value Ref Range    pH, Arterial 7.317     pCO2, Arterial 92.8 mmHg    pO2, Arterial 67.8 mmHg    HCO3, Arterial 47.4 mmol/L    Positive Base Excess, Art 21.2 (H) 0.0 - 2.0 mmol/L    O2 Sat, Arterial 91.2 %    Carboxyhemoglobin 1.2 %    Methemoglobin 1.1 %    Pt Temp 37.0     O2 Device/Flow/% BIPAP     Respiratory Rate 20     Celestino Test PASS     Sample Site Right Radial Artery     Pt. Position SEMI-FOWLERS     Mode IPAP18/EPAP8     FIO2 40    C-Reactive Protein   Result Value Ref Range    .4 (H) 0.0 - 5.0 mg/L   Procalcitonin   Result Value Ref Range    Procalcitonin 0.14 (H) <0.09 ng/mL   EKG 12 Lead   Result Value Ref Range    Ventricular Rate 104 BPM    Atrial Rate 104 BPM    P-R Interval 140 ms    QRS Duration 80 ms    Q-T Interval 316 ms    QTc Calculation (Bazett) 415 ms    P Axis 82 degrees    R Axis 77 degrees    T Axis 80 degrees           RADIOLOGY:  XR CHEST PORTABLE   Final Result   Negative chest radiograph. PROCEDURES:  None    CONSULTS:  IP CONSULT TO PULMONOLOGY  IP CONSULT TO PRIMARY CARE PROVIDER  IP CONSULT TO SOCIAL WORK    CRITICAL CARE:  There was significant risk of life threatening deterioration of patient's condition requiring my direct management. Critical care time 0 minutes, excluding any documented procedures. FINAL IMPRESSION      1. Acute on chronic respiratory failure with hypoxia and hypercapnia (HCC)    2.  Pneumonia due to COVID-19 virus          DISPOSITION / PLAN     DISPOSITION Admitted 01/03/2023 07:11:43 PM      PATIENT REFERRED TO:  No follow-up provider specified.     DISCHARGE MEDICATIONS:  Current Discharge Medication List          Zach Adames MD  Emergency Medicine Resident    (Please note that portions of thisnote were completed with a voice recognition program.  Efforts were made to edit the dictations but occasionally words are mis-transcribed.)        Zach Adames MD  Resident  01/04/23 [id number]

## 2023-01-03 NOTE — ED NOTES
Mode of arrival (squad #, walk in, police, etc) : EMS        Chief complaint(s): Respiratory distress        Arrival Note (brief scenario, treatment PTA, etc). : Pt arrives to the ED from home after calling 911 for shortness of breath. Pt has been experiencing SOB for several days with no improvement. Initial spo2 sat from EMS was in the 70's. Pt was placed on bipap, given 125 solumedrol, 2g mag, as well as an albuterol/combivent. On arrival pt's spo2 was only 81%. Respiratory at bedside to switch to cpap and pt appears to be tolerating well with an spo2 of 100% on 100%o2. Pt remains A&Ox4         C= \"Have you ever felt that you should Cut down on your drinking? \"  No  A= \"Have people Annoyed you by criticizing your drinking? \"  No  G= \"Have you ever felt bad or Guilty about your drinking? \"  No  E= \"Have you ever had a drink as an Eye-opener first thing in the morning to steady your nerves or to help a hangover? \"  No      Deferred []      Reason for deferring: N/A    *If yes to two or more: probable alcohol abuse. Juli Jarrell RN  01/03/23 [id number]

## 2023-01-04 LAB
ABSOLUTE BANDS #: 1.63 K/UL (ref 0–1)
ABSOLUTE EOS #: 0 K/UL (ref 0–0.4)
ABSOLUTE LYMPH #: 0.3 K/UL (ref 1–4.8)
ABSOLUTE MONO #: 0.07 K/UL (ref 0.1–1.3)
ANION GAP SERPL CALCULATED.3IONS-SCNC: 5 MMOL/L (ref 9–17)
BANDS: 44 % (ref 0–10)
BASOPHILS # BLD: 0 % (ref 0–2)
BASOPHILS ABSOLUTE: 0 K/UL (ref 0–0.2)
BUN BLDV-MCNC: 12 MG/DL (ref 6–20)
CALCIUM SERPL-MCNC: 8.8 MG/DL (ref 8.6–10.4)
CHLORIDE BLD-SCNC: 90 MMOL/L (ref 98–107)
CO2: 43 MMOL/L (ref 20–31)
CREAT SERPL-MCNC: <0.4 MG/DL (ref 0.7–1.2)
EKG ATRIAL RATE: 103 BPM
EKG P AXIS: 83 DEGREES
EKG P-R INTERVAL: 134 MS
EKG Q-T INTERVAL: 328 MS
EKG QRS DURATION: 82 MS
EKG QTC CALCULATION (BAZETT): 429 MS
EKG R AXIS: 76 DEGREES
EKG T AXIS: 79 DEGREES
EKG VENTRICULAR RATE: 103 BPM
EOSINOPHILS RELATIVE PERCENT: 0 % (ref 0–4)
GFR SERPL CREATININE-BSD FRML MDRD: ABNORMAL ML/MIN/1.73M2
GLUCOSE BLD-MCNC: 138 MG/DL (ref 70–99)
HCT VFR BLD CALC: 29.5 % (ref 41–53)
HEMOGLOBIN: 9.3 G/DL (ref 13.5–17.5)
LYMPHOCYTES # BLD: 8 % (ref 24–44)
MCH RBC QN AUTO: 30.1 PG (ref 26–34)
MCHC RBC AUTO-ENTMCNC: 31.6 G/DL (ref 31–37)
MCV RBC AUTO: 95.2 FL (ref 80–100)
MONOCYTES # BLD: 2 % (ref 1–7)
MORPHOLOGY: NORMAL
PDW BLD-RTO: 13.3 % (ref 11.5–14.9)
PLATELET # BLD: 159 K/UL (ref 150–450)
PMV BLD AUTO: 6.4 FL (ref 6–12)
POTASSIUM SERPL-SCNC: 4.5 MMOL/L (ref 3.7–5.3)
RBC # BLD: 3.1 M/UL (ref 4.5–5.9)
SEG NEUTROPHILS: 46 % (ref 36–66)
SEGMENTED NEUTROPHILS ABSOLUTE COUNT: 1.7 K/UL (ref 1.3–9.1)
SODIUM BLD-SCNC: 138 MMOL/L (ref 135–144)
WBC # BLD: 3.7 K/UL (ref 3.5–11)

## 2023-01-04 PROCEDURE — 94660 CPAP INITIATION&MGMT: CPT

## 2023-01-04 PROCEDURE — 2580000003 HC RX 258: Performed by: NURSE PRACTITIONER

## 2023-01-04 PROCEDURE — 99291 CRITICAL CARE FIRST HOUR: CPT | Performed by: INTERNAL MEDICINE

## 2023-01-04 PROCEDURE — 2700000000 HC OXYGEN THERAPY PER DAY

## 2023-01-04 PROCEDURE — 2060000000 HC ICU INTERMEDIATE R&B

## 2023-01-04 PROCEDURE — 6360000002 HC RX W HCPCS: Performed by: INTERNAL MEDICINE

## 2023-01-04 PROCEDURE — 6370000000 HC RX 637 (ALT 250 FOR IP): Performed by: NURSE PRACTITIONER

## 2023-01-04 PROCEDURE — 80048 BASIC METABOLIC PNL TOTAL CA: CPT

## 2023-01-04 PROCEDURE — 6370000000 HC RX 637 (ALT 250 FOR IP): Performed by: INTERNAL MEDICINE

## 2023-01-04 PROCEDURE — 94664 DEMO&/EVAL PT USE INHALER: CPT

## 2023-01-04 PROCEDURE — 94761 N-INVAS EAR/PLS OXIMETRY MLT: CPT

## 2023-01-04 PROCEDURE — 36415 COLL VENOUS BLD VENIPUNCTURE: CPT

## 2023-01-04 PROCEDURE — 87641 MR-STAPH DNA AMP PROBE: CPT

## 2023-01-04 PROCEDURE — 85025 COMPLETE CBC W/AUTO DIFF WBC: CPT

## 2023-01-04 PROCEDURE — 2580000003 HC RX 258: Performed by: INTERNAL MEDICINE

## 2023-01-04 PROCEDURE — 94640 AIRWAY INHALATION TREATMENT: CPT

## 2023-01-04 PROCEDURE — 93010 ELECTROCARDIOGRAM REPORT: CPT | Performed by: INTERNAL MEDICINE

## 2023-01-04 RX ORDER — IPRATROPIUM BROMIDE AND ALBUTEROL SULFATE 2.5; .5 MG/3ML; MG/3ML
1 SOLUTION RESPIRATORY (INHALATION) EVERY 4 HOURS
Status: DISCONTINUED | OUTPATIENT
Start: 2023-01-04 | End: 2023-01-19 | Stop reason: HOSPADM

## 2023-01-04 RX ORDER — IPRATROPIUM BROMIDE AND ALBUTEROL SULFATE 2.5; .5 MG/3ML; MG/3ML
1 SOLUTION RESPIRATORY (INHALATION) EVERY 4 HOURS PRN
Status: DISCONTINUED | OUTPATIENT
Start: 2023-01-04 | End: 2023-01-19 | Stop reason: HOSPADM

## 2023-01-04 RX ADMIN — CEFEPIME 2000 MG: 2 INJECTION, POWDER, FOR SOLUTION INTRAVENOUS at 11:38

## 2023-01-04 RX ADMIN — IPRATROPIUM BROMIDE AND ALBUTEROL SULFATE 1 AMPULE: 2.5; .5 SOLUTION RESPIRATORY (INHALATION) at 08:01

## 2023-01-04 RX ADMIN — THIAMINE HCL TAB 100 MG 100 MG: 100 TAB at 08:29

## 2023-01-04 RX ADMIN — IPRATROPIUM BROMIDE AND ALBUTEROL SULFATE 1 AMPULE: 2.5; .5 SOLUTION RESPIRATORY (INHALATION) at 16:42

## 2023-01-04 RX ADMIN — SODIUM CHLORIDE: 9 INJECTION, SOLUTION INTRAVENOUS at 11:39

## 2023-01-04 RX ADMIN — IPRATROPIUM BROMIDE AND ALBUTEROL SULFATE 1 AMPULE: 2.5; .5 SOLUTION RESPIRATORY (INHALATION) at 20:50

## 2023-01-04 RX ADMIN — AMLODIPINE BESYLATE 10 MG: 10 TABLET ORAL at 11:34

## 2023-01-04 RX ADMIN — SODIUM CHLORIDE: 9 INJECTION, SOLUTION INTRAVENOUS at 00:06

## 2023-01-04 RX ADMIN — Medication 5 ML: at 00:03

## 2023-01-04 RX ADMIN — FOLIC ACID 1 MG: 1 TABLET ORAL at 08:29

## 2023-01-04 RX ADMIN — ENOXAPARIN SODIUM 40 MG: 100 INJECTION SUBCUTANEOUS at 08:29

## 2023-01-04 RX ADMIN — SODIUM CHLORIDE: 9 INJECTION, SOLUTION INTRAVENOUS at 00:54

## 2023-01-04 RX ADMIN — AZITHROMYCIN DIHYDRATE 500 MG: 500 INJECTION, POWDER, LYOPHILIZED, FOR SOLUTION INTRAVENOUS at 20:28

## 2023-01-04 RX ADMIN — CEFEPIME 2000 MG: 2 INJECTION, POWDER, FOR SOLUTION INTRAVENOUS at 03:21

## 2023-01-04 RX ADMIN — IPRATROPIUM BROMIDE AND ALBUTEROL SULFATE 1 AMPULE: 2.5; .5 SOLUTION RESPIRATORY (INHALATION) at 23:56

## 2023-01-04 RX ADMIN — METHYLPREDNISOLONE SODIUM SUCCINATE 40 MG: 40 INJECTION, POWDER, LYOPHILIZED, FOR SOLUTION INTRAMUSCULAR; INTRAVENOUS at 06:17

## 2023-01-04 RX ADMIN — METHYLPREDNISOLONE SODIUM SUCCINATE 40 MG: 40 INJECTION, POWDER, LYOPHILIZED, FOR SOLUTION INTRAMUSCULAR; INTRAVENOUS at 17:24

## 2023-01-04 RX ADMIN — CEFEPIME 2000 MG: 2 INJECTION, POWDER, FOR SOLUTION INTRAVENOUS at 18:08

## 2023-01-04 RX ADMIN — IPRATROPIUM BROMIDE AND ALBUTEROL SULFATE 1 AMPULE: 2.5; .5 SOLUTION RESPIRATORY (INHALATION) at 11:44

## 2023-01-04 RX ADMIN — METHYLPREDNISOLONE SODIUM SUCCINATE 40 MG: 40 INJECTION, POWDER, LYOPHILIZED, FOR SOLUTION INTRAMUSCULAR; INTRAVENOUS at 11:34

## 2023-01-04 ASSESSMENT — ENCOUNTER SYMPTOMS
NAUSEA: 0
CHEST TIGHTNESS: 1
COUGH: 1
ABDOMINAL PAIN: 0
ABDOMINAL DISTENTION: 0
VOMITING: 0
SHORTNESS OF BREATH: 1

## 2023-01-04 NOTE — CARE COORDINATION
CASE MANAGEMENT NOTE:    Admission Date:  1/3/2023 Viral Drake is a 61 y.o.  male    Admitted for : COPD exacerbation ([facility] [address] [J44.1]  Acute on chronic respiratory failure with hypoxia and hypercapnia ([facility] [address] [facility] [J96.21, J96.22]  Pneumonia due to COVID-19 virus [U07.1, J12.82]    Met with:  chart review     PCP:Erika [address] St:  Asaf Moore          Is patient alert and oriented at time of discussion:  Yes- but on continuous bipap today     Current Residence/ Living Arrangements:  independently at home             Current Services PTA:  No    Does patient go to outpatient dialysis: No  If yes, location and chair time: NA  Who is their nephrologist? NA    Is patient agreeable to VNS: No    Freedom of choice provided:  No    List of [address] provided: No    VNS chosen:  No    DME:  rollator, wheelchair ramp    Home Oxygen: Yes- 5lpm NC     Nebulizer: yes but not working well and would like a new one    CPAP/BIPAP: unknown    Supplier: [address]     Potential Assistance Needed: No    SNF needed: No    Freedom of choice and list provided: No    Pharmacy:  [facility]       Is patient currently receiving oral anticoagulation therapy? No    Is the Patient an [facility] with Readmission Risk Score greater than 14%? No  If yes, pt needs a follow up appointment made within 7 days. Family Members/Caregivers that pt would like involved in their care:    Yes    If yes, list name here: Laquita Hurst     Transportation Provider:  Family             Discharge Plan:  1/4/23 [employer] COVID+ (afebrile, bipap fio2 40 %) from home DME: home o2 5lpm [place] thru [address], nebulizer, rollator and wheelchair ramp VNS: none Pt denies needs. Sister in law Merle Griggs would like for patient to go to SNF. List left in patient's room. IV zithromax, cefepime , IV solumedrol 40 mg Q6.  Following for needs//JF                           Electronically signed by: Anneliese Naik RN on 1/4/2023 at 9:30 AM

## 2023-01-04 NOTE — ED NOTES
Called ICU spoke to Elizabeth Johnston, she reports Santana Fatmata RN will be notified to get bedside report.      Jannie Sahni RN  01/03/23 [id number]

## 2023-01-04 NOTE — ED NOTES
Respiratory called for patient's treatments. Patient also requested to be weaned off of the CPAP.      Victoriano Case RN  01/03/23 2021

## 2023-01-04 NOTE — PROGRESS NOTES
Patient admitted to ICU 16 and transferred to bed. Monitors placed and patient assessed by primary RN. Patient oriented to room and call light.

## 2023-01-04 NOTE — CONSULTS
[facility] PULMONARY & CRITICAL CARE SPECIALISTS   CONSULT NOTE:      DATE OF CONSULT 1/3/2023    REASON FOR CONSULTATION:  Altered mental status acute respiratory failure with hypercapnia      PCP Victoriano Leon MD     CHIEF COMPLAINT:-\"Having trouble breathing\"    HISTORY OF PRESENT ILLNESS:     Patient is a 79-year-old male. Has a history of COPD hypertension tobacco abuse. Patient presented because of altered mental status. The patient's chest x-ray revealed no gross consolidation  The patient was placed on BiPAP because of concern for acute CO2 narcosis. Lab work included a serum bicarb greater than 45. White count 4.6 with 12% bands  Influenza A influenza B negative, COVID test positive. The patient was on BiPAP and a repeat ABG on BiPAP revealed a pH of 7.317 PCO2 92.9 PO2 of 67.8    The patient was going to be evaluated for COVID acute infection. When I went to talk to the patient, he indicated that he was recently hospitalized for COVID but cannot tell me which hospital he was at    In the ER, the clinical pharmacist was able to locate a PDF from [facility] where the patient was hospitalized and had a COVID-positive test December 8, 2022. The patient was admitted under the [facility] clinic hospitalist group      ALLERGIES:  No Known Allergies    HOME MEDICATIONS:  Include Spiriva, Proventil nebulizer, Symbicort.         PAST MEDICAL HISTORY:  Include COPD hypertension tobacco abuse depression  PAST SURGICAL HISTORY:  Unknown at this time      SOCIAL HISTORY:  Social History     Socioeconomic History    Marital status: Single     Spouse name: Not on file    Number of children: Not on file    Years of education: Not on file    Highest education level: Not on file   Occupational History    Not on file   Tobacco Use    Smoking status: Every Day     Packs/day: 1.00     Years: 43.00     Pack years: 43.00     Types: Cigarettes    Smokeless tobacco: Never   Substance and Sexual Activity Alcohol use: Yes     Comment: social    Drug use: Not Currently    Sexual activity: Not on file   Other Topics Concern    Not on file   Social History Narrative    Not on file     Social Determinants of Health     Financial Resource Strain: Low Risk     Difficulty of Paying Living Expenses: Not hard at all   Food Insecurity: No Food Insecurity    Worried About Running Out of Food in the Last Year: Never true    Ran Out of Food in the Last Year: Never true   Transportation Needs: Not on file   Physical Activity: Not on file   Stress: Not on file   Social Connections: Not on file   Intimate Partner Violence: Not on file   Housing Stability: Not on file       FAMILY HISTORY:  History reviewed. No pertinent family history. REVIEW OF SYSTEMS:  Not obtainable at this time    PHYSICAL EXAM:  Vital Signs Blood pressure 126/88, pulse (!) 109, temperature 97.7 °F (36.5 °C), temperature source Axillary, resp. rate 19, height 5' 7\" (1.702 m), weight 154 lb (69.9 kg), SpO2 96 %. Oxygen Amount and Delivery: O2 Flow Rate (L/min): 100 L/min    Admission Weight Weight: 154 lb (69.9 kg)    General Appearance   Thin, 69-year-old male on BiPAP. Able to speak through his BiPAP. He  Head  Normocephalic, without obvious abnormality, atraumatic    Eyes  conjunctivae clear. Neck  no adenopathy,   Lungs coarse breath sounds I do not hear crackles rales or wheeze  Heart: regular rate and rhythm, S1, S2 normal, no murmur, click, rub or gallop  Abdomen  soft, non-tender;   Extremities no pretibial edema    Skin  Skin color, texture, turgor normal. No rashes or lesions  Neurologic: Alert and oriented X 3, normal strength and tone.          Imaging  Chest x-ray reveals no acute process    Lab Review  CBC     Lab Results   Component Value Date/Time    WBC 4.6 01/03/2023 05:05 PM    RBC 3.62 01/03/2023 05:05 PM    HGB 11.0 01/03/2023 05:05 PM    HCT 34.7 01/03/2023 05:05 PM     01/03/2023 05:05 PM    MCV 95.9 01/03/2023 05:05 PM MCH 30.3 01/03/2023 05:05 PM    MCHC 31.6 01/03/2023 05:05 PM    RDW 12.8 01/03/2023 05:05 PM    LYMPHOPCT 16 01/03/2023 05:05 PM    MONOPCT 17 01/03/2023 05:05 PM    BASOPCT 0 01/03/2023 05:05 PM    MONOSABS 0.78 01/03/2023 05:05 PM    LYMPHSABS 0.74 01/03/2023 05:05 PM    EOSABS 0.05 01/03/2023 05:05 PM    BASOSABS 0.00 01/03/2023 05:05 PM    DIFFTYPE NOT REPORTED 01/28/2022 10:12 AM       BMP   Lab Results   Component Value Date/Time     01/03/2023 05:05 PM    K 4.3 01/03/2023 05:05 PM    CL 90 01/03/2023 05:05 PM    CO2 >45 01/03/2023 05:05 PM    BUN 15 01/03/2023 05:05 PM    CREATININE 0.51 01/03/2023 05:05 PM    GLUCOSE 274 01/03/2023 05:05 PM    CALCIUM 9.4 01/03/2023 05:05 PM       LFTS  Lab Results   Component Value Date/Time    ALKPHOS 79 06/22/2022 03:43 PM    ALT 8 06/22/2022 03:43 PM    AST 10 06/22/2022 03:43 PM    PROT 6.6 06/22/2022 03:43 PM    BILITOT 0.19 06/22/2022 03:43 PM    LABALBU 4.0 06/22/2022 03:43 PM       INR  No results for input(s): PROTIME, INR in the last 72 hours. APTT  No results for input(s): APTT in the last 72 hours. Lactic Acid  No results found for: LACTA     PRO-BNP   Recent Labs     01/03/23  1705   PROBNP 88           ABGs:   Lab Results   Component Value Date/Time    PHART 7.317 01/03/2023 06:34 PM    PO2ART 67.8 01/03/2023 06:34 PM    OKI9HIA 92.8 01/03/2023 06:34 PM       Lab Results   Component Value Date/Time    MODE IPAP18/EPAP8 01/03/2023 06:34 PM         Impression  1. Acute upon chronic hypoxemic and hypercapnic respiratory failure. Currently on BiPAP. 2.  Severe COPD, on Spiriva, Symbicort, Proventil  3. Tobacco abuse  4. Hypertension  5. Depression  6. Recent hospitalization at [address] [place] [address] for COVID-19. Positive test December 8.--- I conferred with the lab at [address] [place] [address]. 7.  Severe chronic hypercapnia. Patient's serum bicarb greater than 45  8. Normal white count 4.6 but 12% band  9.   Full code      Plan:      Check procalcitonin  Empiric antibiotics including cefepime and Zithromax.   Given positive COVID test December 8, 2022, we can release patient from droplet plus isolation  DuoNeb treatments  VT prophylax  Steroids  ICU intermediate    Appreciate ER help    Thank you Dr. Ruth Ramirez for the consult    CC time  39 minutes    Monae Thomas MD

## 2023-01-04 NOTE — PROGRESS NOTES
[address] [facility] Internal Medicine  Bal Taveras MD; Mathew Wolf MD; Anna Gonzalez MD; Andrew Rutter, MD Cherylene Patten, MD; Estefania Kim MD  [facility]   [address]                 Date:   1/3/2023  Patientname:  Micheal Hermosillo  Date of admission:  1/3/2023  5:10 PM  MRN:   [id number]  Account:  [de-identified]  YOB: 1965  PCP:    Estefania Kim MD  Room:   [address] [place] [address]  Code Status:    Full code       Chief Complaint:     Chief Complaint   Patient presents with    Respiratory Distress       History of Present Illness:     Micheal Hermosillo is a 61 y.o. Non- / non  male who presents with Respiratory Distress and is admitted to the hospital for the management of COPD exacerbation ([facility] [address] Significant medical history includes COPD, chronic respiratory failure with hypoxia, HTN, left renal mass, alcoholism, tobacco use and depression. Patient presented to the ED via EMS for respiratory distress. In route he was placed on CPAP and was given Solu-Medrol and magnesium. He was transitioned to BiPAP in the ED. COVID positive but he was previously hospitalized at [place] [address] and tested positive for COVID on 12/8/22. .4, no leukocytosis. CXR shows mild emphysema with no acute concerns. EKG sinus tachycardia. Denies fever, chills, chest pain, abdominal pain, nausea, vomiting, diarrhea, and urinary symptoms. There are no aggravating or alleviating factors. Symptoms are reported as acute, constant and severe. Past Medical History:     History reviewed. No pertinent past medical history. Past Surgical History:     History reviewed. No pertinent surgical history. Medications Prior to Admission:     Prior to Admission medications    Medication Sig Start Date End Date Taking?  Authorizing Provider   escitalopram (LEXAPRO) 10 MG tablet Take 1 tablet by mouth daily  Patient not taking: Reported on 1/3/2023 6/22/22   Hector Dorsey Keenan Woody MD   folic acid (FOLVITE) 1 MG tablet Take 1 tablet by mouth daily 6/22/22   Arleth Cordova MD   SYMBICORT 160-4.5 MCG/ACT AERO Inhale 2 puffs into the lungs daily 6/22/22   Arleth Cordova MD   thiamine 100 MG tablet Take 1 tablet by mouth daily 6/22/22   Arleth Cordova MD   tiotropium (Zada Nipple) 18 MCG inhalation capsule Inhale 1 capsule into the lungs daily 6/22/22   Arleth Cordova MD   traZODone (DESYREL) 50 MG tablet Take 1 tablet by mouth nightly  Patient not taking: Reported on 1/3/2023 6/22/22   Arleth Cordova MD   vitamin D (ERGOCALCIFEROL) 1.25 MG (47196 UT) CAPS capsule Take 1 capsule by mouth once a week 6/22/22   Arleth Cordova MD   albuterol (PROVENTIL) (2.5 MG/3ML) 0.083% nebulizer solution Take 3 mLs by nebulization 4 times daily 6/7/22   Juma Vela DO   amLODIPine (NORVASC) 10 MG tablet Take 1 tablet by mouth daily 3/25/22   Arleth Cordova MD        Allergies:     Patient has no known allergies. Social History:     Tobacco:    reports that he has been smoking cigarettes. He has a 43.00 pack-year smoking history. He has never used smokeless tobacco.  Alcohol:      reports current alcohol use. Drug Use:  reports that he does not currently use drugs. Family History:     History reviewed. No pertinent family history.     Investigations:      Laboratory Testing:  Recent Results (from the past 24 hour(s))   Basic Metabolic Panel    Collection Time: 01/03/23  5:05 PM   Result Value Ref Range    Glucose 274 (H) 70 - 99 mg/dL    BUN 15 6 - 20 mg/dL    Creatinine 0.51 (L) 0.70 - 1.20 mg/dL    Est, Glom Filt Rate >60 >60 mL/min/1.73m2    Calcium 9.4 8.6 - 10.4 mg/dL    Sodium 139 135 - 144 mmol/L    Potassium 4.3 3.7 - 5.3 mmol/L    Chloride 90 (L) 98 - 107 mmol/L    CO2 >45 (HH) 20 - 31 mmol/L    Anion Gap Unable to calculate anion gap due to CO2 >45. 9 - 17 mmol/L   CBC with Auto Differential    Collection Time: 01/03/23  5:05 PM   Result Value Ref Range    WBC 4.6 3.5 - 11.0 k/uL    RBC 3.62 (L) 4.5 - 5.9 m/uL    Hemoglobin 11.0 (L) 13.5 - 17.5 g/dL    Hematocrit 34.7 (L) 41 - 53 %    MCV 95.9 80 - 100 fL    MCH 30.3 26 - 34 pg    MCHC 31.6 31 - 37 g/dL    RDW 12.8 11.5 - 14.9 %    Platelets 092 904 - 469 k/uL    MPV 6.1 6.0 - 12.0 fL    Seg Neutrophils 54 36 - 66 %    Lymphocytes 16 (L) 24 - 44 %    Monocytes 17 (H) 1 - 7 %    Eosinophils % 1 0 - 4 %    Basophils 0 0 - 2 %    Bands 12 (H) 0 - 10 %    Segs Absolute 2.48 1.3 - 9.1 k/uL    Absolute Lymph # 0.74 (L) 1.0 - 4.8 k/uL    Absolute Mono # 0.78 0.1 - 1.3 k/uL    Absolute Eos # 0.05 0.0 - 0.4 k/uL    Basophils Absolute 0.00 0.0 - 0.2 k/uL    Absolute Bands # 0.55 0.0 - 1.0 k/uL    Morphology Normal    Brain Natriuretic Peptide    Collection Time: 01/03/23  5:05 PM   Result Value Ref Range    Pro-BNP 88 <300 pg/mL   Troponin    Collection Time: 01/03/23  5:05 PM   Result Value Ref Range    Troponin, High Sensitivity 8 0 - 22 ng/L   COVID-19 & Influenza Combo    Collection Time: 01/03/23  5:21 PM    Specimen: Nasopharyngeal Swab   Result Value Ref Range    Specimen Description . NASOPHARYNGEAL SWAB     Source . NASOPHARYNGEAL SWAB     SARS-CoV-2 RNA, RT PCR DETECTED (A) Not Detected    INFLUENZA A Not Detected Not Detected    INFLUENZA B Not Detected Not Detected   Arterial Blood Gases    Collection Time: 01/03/23  5:23 PM   Result Value Ref Range    pH, Arterial 7.209 (LL) 7.350 - 7.450    pCO2, Arterial VALUE ABOVE REPORTABLE RANGE 35.0 - 45.0 mmHg    pO2, Arterial 170.0 (H) 80.0 - 100.0 mmHg    O2 Sat, Arterial 96.9 95 - 98 %    Carboxyhemoglobin 1.1 0 - 5 %    Methemoglobin 0.9 0.0 - 1.9 %    Pt Temp 37.0     O2 Device/Flow/% BIPAP     Respiratory Rate 16     Celestino Test PASS     Sample Site Right Radial Artery     Pt.  Position SEMI-FOWLERS     Mode BIPAP 16/8    EKG 12 Lead    Collection Time: 01/03/23  5:52 PM   Result Value Ref Range    Ventricular Rate 104 BPM    Atrial Rate 104 BPM    P-R Interval 140 ms    QRS Duration 80 ms    Q-T Interval 316 ms    QTc Calculation (Bazett) 415 ms    P Axis 82 degrees    R Axis 77 degrees    T Axis 80 degrees   BLOOD GAS, ARTERIAL    Collection Time: 01/03/23  6:34 PM   Result Value Ref Range    pH, Arterial 7.317     pCO2, Arterial 92.8 mmHg    pO2, Arterial 67.8 mmHg    HCO3, Arterial 47.4 mmol/L    Positive Base Excess, Art 21.2 (H) 0.0 - 2.0 mmol/L    O2 Sat, Arterial 91.2 %    Carboxyhemoglobin 1.2 %    Methemoglobin 1.1 %    Pt Temp 37.0     O2 Device/Flow/% BIPAP     Respiratory Rate 20     Celestino Test PASS     Sample Site Right Radial Artery     Pt. Position SEMI-FOWLERS     Mode IPAP18/EPAP8     FIO2 40    C-Reactive Protein    Collection Time: 01/03/23  9:04 PM   Result Value Ref Range    .4 (H) 0.0 - 5.0 mg/L       Imaging/Diagnostics:  XR CHEST PORTABLE    Result Date: 1/3/2023  Negative chest radiograph. Plan:     Patient status inpatient in the Medical ICU    COPD Exacerbation  -acute on chronic resp failure  -requiring bipap  -IV Solumedrol initiated in ED  -Continue on admission  -IV Zithromax and cefepime  -Respiratory care eval and treat  -Duoneb aerosols   -Albuterol aerosols PRN  -pulmonary consult    Covid -19  -Rapid Covid swab positive in ED  -initially diagnosed 12/8/22 at [facility] does not need isolation  -CXR without acute concerns    Full code    Lovenox for DVT prophylaxis    ERICA Dominguez - NP  1/3/2023  9:17 PM      Please note that this chart was generated using voice recognition Dragon dictation software. Although every effort was made to ensure the accuracy of this automated transcription, some errors in transcription may have occurred. Nancy [address]    Phone [phone]

## 2023-01-04 NOTE — PLAN OF CARE
Attempt to wean patient off of bipap, alternate bipap and nasal cannula until achieved. Encourage dietary intake to maintain nutrient requirements.

## 2023-01-04 NOTE — CARE COORDINATION
Post Acute Facility/Agency List     Provided  UNRULY Kilgore   with the following list, the list includes the overall star ratings obtained from CMS per the Medicare Web site (www.Medicare.gov):     [] [address]  [] Acute Inpatient Rehabilitation Facilities  [x] Skilled Nursing Facilities  [x] Home Care    Provided verbal instructions on how to utilize the QR Code to obtain additional detailed star ratings from www. Medicare. gov     offered to print and provide the detailed list:    [x]Accepted   []Declined    Electronically signed by Malia Camp RN on 1/4/2023 at 3:55 PM

## 2023-01-04 NOTE — H&P
DARRION Berkowitz 52    HISTORY AND PHYSICAL EXAMINATION            Date:   1/4/2023  Patient name:  Viral Drake  Date of admission:  1/3/2023  5:10 PM  MRN:   [id number]  Account:  [de-identified]  YOB: 1965  PCP:    Vandana Alvarenga MD  Room:   2016/2016-01  Code Status:    Full Code    Chief Complaint:     Chief Complaint   Patient presents with    Respiratory Distress       History Obtained From:     patient, electronic medical record    History of Present Illness: The patient is a 61 y.o. Non- / non  male who presents with Respiratory Distress   and he is admitted to the hospital for the management of shortness of breath  Patient past medical history multiple medical problem which include hypertension, advanced COPD, chronic alcoholism, patient, was admitted with worsening shortness of breath, symptoms are going on for last few days before presentation  Patient apparently was not taking his medication  Was admitted at [facility] with [place]  Patient denying complaints of chest pain  In emergency room COVID was positive, was cleared by pulmonary medicine team for droplet plus precautions  Patient in emergency room, was found to have pH of 7.2, PCO2 was too high to be recorded, remained on BiPAP overnight, repeat PCO2 is 91          Past Medical History:     Past Medical History:   Diagnosis Date    COPD (chronic obstructive pulmonary disease) ([facility] [address]     Hypertension         Past Surgical History:     History reviewed. No pertinent surgical history. Medications Prior to Admission:     Prior to Admission medications    Medication Sig Start Date End Date Taking?  Authorizing Provider   escitalopram (LEXAPRO) 10 MG tablet Take 1 tablet by mouth daily  Patient not taking: Reported on 1/3/2023 6/22/22   Vandana Alvarenga MD   folic acid (FOLVITE) 1 MG tablet Take 1 tablet by mouth daily 6/22/22   Vandana Alvraenga MD   [facility] 160-4.5 MCG/ACT AERO Inhale 2 puffs into the lungs daily 6/22/22   Steven Salgado MD   thiamine 100 MG tablet Take 1 tablet by mouth daily 6/22/22   Steven Salgado MD   tiotropium ([place] Elpidio) 18 MCG inhalation capsule Inhale 1 capsule into the lungs daily 6/22/22   Steven Salgado MD   traZODone (DESYREL) 50 MG tablet Take 1 tablet by mouth nightly  Patient not taking: Reported on 1/3/2023 6/22/22   Steven Salgado MD   vitamin D (ERGOCALCIFEROL) 1.25 MG (28453 UT) CAPS capsule Take 1 capsule by mouth once a week 6/22/22   Steven Salgado MD   albuterol (PROVENTIL) (2.5 MG/3ML) 0.083% nebulizer solution Take 3 mLs by nebulization 4 times daily 6/7/22   Jessica Rene DO   amLODIPine (NORVASC) 10 MG tablet Take 1 tablet by mouth daily 3/25/22   Steven Salgado MD        Allergies:     Patient has no known allergies. Social History:     Tobacco:    reports that he has been smoking cigarettes. He has a 43.00 pack-year smoking history. He has never used smokeless tobacco.  Alcohol:      reports current alcohol use. Drug Use:  reports that he does not currently use drugs. Family History:     History reviewed. No pertinent family history. Review of Systems:     Positive and Negative as described in HPI. CONSTITUTIONAL:  negative for fevers, chills, sweats, fatigue, weight loss  HEENT:  negative for vision, hearing changes, runny nose, throat pain  RESPIRATORY: Shortness of breath, cough  CARDIOVASCULAR:  negative for chest pain, palpitations.   Patient has pain on right side of lower chest which worse with coughing  GASTROINTESTINAL:  negative for nausea, vomiting, diarrhea, constipation, change in bowel habits, abdominal pain   GENITOURINARY:  negative for difficulty of urination, burning with urination, frequency   INTEGUMENT:  negative for rash, skin lesions, easy bruising   HEMATOLOGIC/LYMPHATIC:  negative for swelling/edema   ALLERGIC/IMMUNOLOGIC:  negative for urticaria , itching  ENDOCRINE: negative increase in drinking, increase in urination, hot or cold intolerance  MUSCULOSKELETAL:  negative joint pains, muscle aches, swelling of joints, clubbing present   NEUROLOGICAL:  negative for headaches, dizziness, lightheadedness, numbness, pain, tingling extremities  BEHAVIOR/PSYCH:  negative for depression, anxiety    Physical Exam:   /71   Pulse (!) 111   Temp 98.6 °F (37 °C) (Oral)   Resp 15   Ht 5' 7.72\" (1.72 m)   Wt 143 lb 11.8 oz (65.2 kg)   SpO2 95%   BMI 22.04 kg/m²   Temp (24hrs), Av.9 °F (36.6 °C), Min:97.6 °F (36.4 °C), Max:98.6 °F (37 °C)    No results for input(s): POCGLU in the last 72 hours. Intake/Output Summary (Last 24 hours) at 2023 1136  Last data filed at 2023 0945  Gross per 24 hour   Intake 1379.34 ml   Output --   Net 1379.34 ml       General Appearance:  alert, well appearing, and in no acute distress  Mental status: oriented to person, place, and time with normal affect  Head:  normocephalic, atraumatic. Eye: no icterus, redness, pupils equal and reactive, extraocular eye movements intact, conjunctiva clear  Ear: normal external ear, no discharge, hearing intact  Nose:  no drainage noted  Mouth: mucous membranes moist  Neck: supple, no carotid bruits, thyroid not palpable  Lungs: Air entry, bilateral decreased, no rales, rhonchi  Cardiovascular: normal rate, regular rhythm, no murmur, gallop, rub.   Abdomen: Soft, nontender, nondistended, normal bowel sounds, no hepatomegaly or splenomegaly  Neurologic: There are no new focal motor or sensory deficits, normal muscle tone and bulk, no abnormal sensation, normal speech, cranial nerves II through XII grossly intact  Skin: No gross lesions, rashes, bruising or bleeding on exposed skin area  Extremities:  peripheral pulses palpable, no pedal edema or calf pain with palpation  Psych: normal affect     Investigations:      Laboratory Testing:  Recent Results (from the past 24 hour(s))   Basic Metabolic Panel    Collection Time: 01/03/23  5:05 PM   Result Value Ref Range    Glucose 274 (H) 70 - 99 mg/dL    BUN 15 6 - 20 mg/dL    Creatinine 0.51 (L) 0.70 - 1.20 mg/dL    Est, Glom Filt Rate >60 >60 mL/min/1.73m2    Calcium 9.4 8.6 - 10.4 mg/dL    Sodium 139 135 - 144 mmol/L    Potassium 4.3 3.7 - 5.3 mmol/L    Chloride 90 (L) 98 - 107 mmol/L    CO2 >45 (HH) 20 - 31 mmol/L    Anion Gap Unable to calculate anion gap due to CO2 >45. 9 - 17 mmol/L   CBC with Auto Differential    Collection Time: 01/03/23  5:05 PM   Result Value Ref Range    WBC 4.6 3.5 - 11.0 k/uL    RBC 3.62 (L) 4.5 - 5.9 m/uL    Hemoglobin 11.0 (L) 13.5 - 17.5 g/dL    Hematocrit 34.7 (L) 41 - 53 %    MCV 95.9 80 - 100 fL    MCH 30.3 26 - 34 pg    MCHC 31.6 31 - 37 g/dL    RDW 12.8 11.5 - 14.9 %    Platelets 182 150 - 450 k/uL    MPV 6.1 6.0 - 12.0 fL    Seg Neutrophils 54 36 - 66 %    Lymphocytes 16 (L) 24 - 44 %    Monocytes 17 (H) 1 - 7 %    Eosinophils % 1 0 - 4 %    Basophils 0 0 - 2 %    Bands 12 (H) 0 - 10 %    Segs Absolute 2.48 1.3 - 9.1 k/uL    Absolute Lymph # 0.74 (L) 1.0 - 4.8 k/uL    Absolute Mono # 0.78 0.1 - 1.3 k/uL    Absolute Eos # 0.05 0.0 - 0.4 k/uL    Basophils Absolute 0.00 0.0 - 0.2 k/uL    Absolute Bands # 0.55 0.0 - 1.0 k/uL    Morphology Normal    Brain Natriuretic Peptide    Collection Time: 01/03/23  5:05 PM   Result Value Ref Range    Pro-BNP 88 <300 pg/mL   Troponin    Collection Time: 01/03/23  5:05 PM   Result Value Ref Range    Troponin, High Sensitivity 8 0 - 22 ng/L   COVID-19 & Influenza Combo    Collection Time: 01/03/23  5:21 PM    Specimen: Nasopharyngeal Swab   Result Value Ref Range    Specimen Description .NASOPHARYNGEAL SWAB     Source .NASOPHARYNGEAL SWAB     SARS-CoV-2 RNA, RT PCR DETECTED (A) Not Detected    INFLUENZA A Not Detected Not Detected    INFLUENZA B Not Detected Not Detected   Arterial Blood Gases    Collection Time: 01/03/23  5:23 PM   Result Value Ref Range    pH, Arterial 7.209 (LL) 7.350 - 7.450     pCO2, Arterial VALUE ABOVE REPORTABLE RANGE 35.0 - 45.0 mmHg    pO2, Arterial 170.0 (H) 80.0 - 100.0 mmHg    O2 Sat, Arterial 96.9 95 - 98 %    Carboxyhemoglobin 1.1 0 - 5 %    Methemoglobin 0.9 0.0 - 1.9 %    Pt Temp 37.0     O2 Device/Flow/% BIPAP     Respiratory Rate 16     Celestino Test PASS     Sample Site Right Radial Artery     Pt. Position SEMI-FOWLERS     Mode BIPAP 16/8    EKG 12 Lead    Collection Time: 01/03/23  5:53 PM   Result Value Ref Range    Ventricular Rate 103 BPM    Atrial Rate 103 BPM    P-R Interval 134 ms    QRS Duration 82 ms    Q-T Interval 328 ms    QTc Calculation (Bazett) 429 ms    P Axis 83 degrees    R Axis 76 degrees    T Axis 79 degrees   BLOOD GAS, ARTERIAL    Collection Time: 01/03/23  6:34 PM   Result Value Ref Range    pH, Arterial 7.317     pCO2, Arterial 92.8 mmHg    pO2, Arterial 67.8 mmHg    HCO3, Arterial 47.4 mmol/L    Positive Base Excess, Art 21.2 (H) 0.0 - 2.0 mmol/L    O2 Sat, Arterial 91.2 %    Carboxyhemoglobin 1.2 %    Methemoglobin 1.1 %    Pt Temp 37.0     O2 Device/Flow/% BIPAP     Respiratory Rate 20     Celestino Test PASS     Sample Site Right Radial Artery     Pt.  Position SEMI-FOWLERS     Mode IPAP18/EPAP8     FIO2 40    C-Reactive Protein    Collection Time: 01/03/23  9:04 PM   Result Value Ref Range    .4 (H) 0.0 - 5.0 mg/L   Procalcitonin    Collection Time: 01/03/23  9:04 PM   Result Value Ref Range    Procalcitonin 0.14 (H) <0.09 ng/mL   Basic Metabolic Panel w/ Reflex to MG    Collection Time: 01/04/23  4:13 AM   Result Value Ref Range    Glucose 138 (H) 70 - 99 mg/dL    BUN 12 6 - 20 mg/dL    Creatinine <0.40 (L) 0.70 - 1.20 mg/dL    Est, Glom Filt Rate Can not be calculated >60 mL/min/1.73m2    Calcium 8.8 8.6 - 10.4 mg/dL    Sodium 138 135 - 144 mmol/L    Potassium 4.5 3.7 - 5.3 mmol/L    Chloride 90 (L) 98 - 107 mmol/L    CO2 43 (HH) 20 - 31 mmol/L    Anion Gap 5 (L) 9 - 17 mmol/L   CBC with Auto Differential    Collection Time: 01/04/23  4:13 AM Result Value Ref Range    WBC 3.7 3.5 - 11.0 k/uL    RBC 3.10 (L) 4.5 - 5.9 m/uL    Hemoglobin 9.3 (L) 13.5 - 17.5 g/dL    Hematocrit 29.5 (L) 41 - 53 %    MCV 95.2 80 - 100 fL    MCH 30.1 26 - 34 pg    MCHC 31.6 31 - 37 g/dL    RDW 13.3 11.5 - 14.9 %    Platelets 651 242 - 987 k/uL    MPV 6.4 6.0 - 12.0 fL    Seg Neutrophils 46 36 - 66 %    Lymphocytes 8 (L) 24 - 44 %    Monocytes 2 1 - 7 %    Eosinophils % 0 0 - 4 %    Basophils 0 0 - 2 %    Bands 44 (H) 0 - 10 %    Segs Absolute 1.70 1.3 - 9.1 k/uL    Absolute Lymph # 0.30 (L) 1.0 - 4.8 k/uL    Absolute Mono # 0.07 (L) 0.1 - 1.3 k/uL    Absolute Eos # 0.00 0.0 - 0.4 k/uL    Basophils Absolute 0.00 0.0 - 0.2 k/uL    Absolute Bands # 1.63 (H) 0.0 - 1.0 k/uL    Morphology Normal        Imaging/Diagnostics:      Assessment :      Primary Problem  COPD exacerbation (HCC)    Active Hospital Problems    Diagnosis Date Noted    COPD exacerbation ([facility] [J44.1] 06/22/2022     Priority: Medium       Plan:     Patient status Admit as inpatient in the  Medical ICU    Advanced COPD,, admitted with worsening shortness of breath,  Acute hypercapnic respiratory failure, on BiPAP, steroids, DuoNeb nebulization  Hypertension, restarted home dose of Norvasc, controlled  On DVT prophylaxis Lovenox  Patient is clubbing, CT chest done in 6/22 , which was negative   Overall prognosis guarded, given advanced COPD, chronic hypoxic respiratory failure  Patient had a left renal mass, which was seen in CT scan in June of this year, need urology input as outpatient  Critically sick  CC time 35 minutes  Consultations:   IP CONSULT TO PULMONOLOGY  IP CONSULT TO PRIMARY CARE PROVIDER  IP CONSULT TO SOCIAL WORK    Patient is admitted as inpatient status because of co-morbidities listed above, severity of signs and symptoms as outlined, requirement for current medical therapies and most importantly because of direct risk to patient if care not provided in a hospital setting.     Lex Cowden, MD  1/4/2023  11:36 AM    Copy sent to Dr. Erika Quiroz MD    Please note that this chart was generated using voice recognition Dragon dictation software.  Although every effort was made to ensure the accuracy of this automated transcription, some errors in transcription may have occurred.

## 2023-01-04 NOTE — PROGRESS NOTES
[address]   OCCUPATIONAL THERAPY MISSED TREATMENT NOTE   INPATIENT   Date: 23  Patient Name: Shea Case       Room:   MRN: [id number]   Account #: [de-identified]    : 1965  (58 y.o.)  Gender: male                 REASON FOR MISSED TREATMENT:  Patient refusal   -    Okay to attempt OT PT evaluation per TRACEE Crowley. RN present in room when patient declined. Pt refused, stating \"I don't want to. I'm too tired. \" RN and writer encouraged patient to participate in therapy, in which he continued to decline. OT will continue to follow.   [address], OT

## 2023-01-04 NOTE — ED NOTES
Report given to TRACEE Santana from ED. Report method in person   The following was reviewed with receiving RN:   Current vital signs:  /88   Pulse (!) 109   Temp 97.7 °F (36.5 °C) (Axillary)   Resp 19   Ht 5' 7\" (1.702 m)   Wt 154 lb (69.9 kg)   SpO2 96%   BMI 24.12 kg/m²                MEWS Score: 3     Any medication or safety alerts were reviewed. Any pending diagnostics and notifications were also reviewed, as well as any safety concerns or issues, abnormal labs, abnormal imaging, and abnormal assessment findings. Questions were answered.           Flaco Artis RN  01/03/23 9450

## 2023-01-04 NOTE — PROGRESS NOTES
Physical Therapy          Physical Therapy Cancel Note      DATE: 2023    NAME: Oscar Dorman  MRN: [id number]   : 1965      Patient not seen this date for Physical Therapy due to:    Patient refusal   -    Okay to attempt OT PT evaluation per RN Anmol Moses. RN present in room when patient declined. Pt refused, stating \"I don't want to. I'm too tired. \" RN and writer encouraged patient to participate in therapy, in which he continued to decline. OT will continue to follow.   1420      Electronically signed by Rufina Gant PT on 2023 at 2:46 PM

## 2023-01-04 NOTE — PROGRESS NOTES
Medication History completed:    New medications: none    Medications discontinued: none    Medications flagged for review:   Escitalopram - not filled since July 2022 for 30 days  Trazodone - not filled since May 2022 for 30 days    Changes to dosing: none    Stated allergies: NKDA    Other pertinent information: Medications confirmed with [address]      Thank you,  Sunitha Beck, OkD, BCPS  [phone]

## 2023-01-04 NOTE — PROGRESS NOTES
ICU Progress Note (Non-Vent)  [facility]    Patient - Scott Rodriguez,  Age - 61 y.o.    - 1965      Room Number -    N -  [id number]   Acct # - [de-identified]  Date of Admission -  1/3/2023  5:10 PM    Events of Past 24 Hours   Alert and oriented, currently wearing BiPAP    Vitals    height is 5' 7.72\" (1.72 m) and weight is 143 lb 11.8 oz (65.2 kg). His oral temperature is 98.6 °F (37 °C). His blood pressure is 126/71 and his pulse is 110 (abnormal). His respiration is 22 and oxygen saturation is 99%.        Temperature Range: Temp: 98.6 °F (37 °C) Temp  Av.9 °F (36.6 °C)  Min: 97.6 °F (36.4 °C)  Max: 98.6 °F (37 °C)  BP Range:  Systolic (37NTR), YKD:198 , Min:113 , WCS:061     Diastolic (10VQF), VVT:45, Min:66, Max:88    Pulse Range: Pulse  Av.1  Min: 77  Max: 117  Respiration Range: Resp  Av  Min: 15  Max: 31  Current Pulse Ox[de-identified]  SpO2: 99 %  24HR Pulse Ox Range:  SpO2  Av.2 %  Min: 87 %  Max: 100 %  Oxygen Amount and Delivery: O2 Flow Rate (L/min): 5 L/min    Wt Readings from Last 3 Encounters:   23 143 lb 11.8 oz (65.2 kg)   22 154 lb 1.6 oz (69.9 kg)   22 140 lb 6.4 oz (63.7 kg)     I/O     Intake/Output Summary (Last 24 hours) at 2023 1603  Last data filed at 2023 0945  Gross per 24 hour   Intake 1379.34 ml   Output --   Net 1379.34 ml     DRAIN/TUBE OUTPUT       Invasive Lines   ICP PRESSURE RANGE  No data recorded  CVP PRESSURE RANGE  No data recorded      Medications      enoxaparin  40 mg SubCUTAneous Daily    azithromycin  500 mg IntraVENous Q24H    cefepime  2,000 mg IntraVENous Q8H    albuterol sulfate HFA  2 puff Inhalation Q4H While awake    And    ipratropium  2 puff Inhalation Q4H While awake    amLODIPine  10 mg Oral Daily    folic acid  1 mg Oral Daily    budesonide-formoterol  2 puff Inhalation Daily    thiamine mononitrate  100 mg Oral Daily    sodium chloride flush  5-40 mL IntraVENous 2 times per day    ipratropium-albuterol  1 ampule Inhalation Q4H WA    methylPREDNISolone  40 mg IntraVENous Q6H     sodium chloride flush, sodium chloride, ondansetron **OR** ondansetron, polyethylene glycol, acetaminophen **OR** acetaminophen, albuterol  IV Drips/Infusions   sodium chloride      sodium chloride 75 mL/hr at 01/04/23 1139       Diet/Nutrition   ADULT DIET; Regular    Exam      Constitutional - Alert, arousable  General Appearance underdeveloped  HEENT -normocephalic, atraumatic. PERRLA  Lungs - Chest expands equally, no wheezes, rales or rhonchi. Diminished poor air movement  Cardiovascular - Heart sounds are normal.  normal rate and rhythm regular, no murmur, gallop or rub. Abdomen - soft, nontender, nondistended, no masses or organomegaly  Neurologic - CN II-XII are grossly intact.  There are no focal motor deficits  Skin - no bruising or bleeding  Extremities - no cyanosis, clubbing or edema    Lab Results   CBC     Lab Results   Component Value Date/Time    WBC 3.7 01/04/2023 04:13 AM    RBC 3.10 01/04/2023 04:13 AM    HGB 9.3 01/04/2023 04:13 AM    HCT 29.5 01/04/2023 04:13 AM     01/04/2023 04:13 AM    MCV 95.2 01/04/2023 04:13 AM    MCH 30.1 01/04/2023 04:13 AM    MCHC 31.6 01/04/2023 04:13 AM    RDW 13.3 01/04/2023 04:13 AM    LYMPHOPCT 8 01/04/2023 04:13 AM    MONOPCT 2 01/04/2023 04:13 AM    BASOPCT 0 01/04/2023 04:13 AM    MONOSABS 0.07 01/04/2023 04:13 AM    LYMPHSABS 0.30 01/04/2023 04:13 AM    EOSABS 0.00 01/04/2023 04:13 AM    BASOSABS 0.00 01/04/2023 04:13 AM    DIFFTYPE NOT REPORTED 01/28/2022 10:12 AM       BMP   Lab Results   Component Value Date/Time     01/04/2023 04:13 AM    K 4.5 01/04/2023 04:13 AM    CL 90 01/04/2023 04:13 AM    CO2 43 01/04/2023 04:13 AM    BUN 12 01/04/2023 04:13 AM    CREATININE <0.40 01/04/2023 04:13 AM    GLUCOSE 138 01/04/2023 04:13 AM       LFTS  Lab Results   Component Value Date/Time    ALKPHOS 79 06/22/2022 03:43 PM    ALT 8 06/22/2022 03:43 PM    AST 10 06/22/2022 03:43 PM    PROT 6.6 06/22/2022 03:43 PM    BILITOT 0.19 06/22/2022 03:43 PM    LABALBU 4.0 06/22/2022 03:43 PM       ABG ABGs:   Lab Results   Component Value Date/Time    PHART 7.317 01/03/2023 06:34 PM    PO2ART 67.8 01/03/2023 06:34 PM    QLV1RYK 92.8 01/03/2023 06:34 PM       Lab Results   Component Value Date/Time    MODE IPAP18/EPAP8 01/03/2023 06:34 PM         INR  No results for input(s): PROTIME, INR in the last 72 hours. APTT  No results for input(s): APTT in the last 72 hours. Lactic Acid  No results found for: LACTA     BNP   No results for input(s): BNP in the last 72 hours.      Cultures       Radiology     CXR      CT Scans    (See actual reports for details)      SYSTEMS ASSESSMENT    Acute on chronic hypoxic and hypercapnic respiratory failure  Severe's COPD clinically  History of tobacco use  History of recent COVID infection early December at [facility]  Full code    Does not need droplet plus isolation  Can be off BiPAP to eat and as tolerated, needs to wear it at night and for respiratory distress  Switch to DuoNebs every 4 hours  Continue cefepime/Zithromax  No change in steroid dose  DVT prophylaxis      Critical Care Time   0 min    Electronically signed by Melba Gaming MD on 1/4/2023 at 4:03 PM

## 2023-01-05 LAB
ABSOLUTE BANDS #: 0.72 K/UL (ref 0–1)
ABSOLUTE EOS #: 0 K/UL (ref 0–0.4)
ABSOLUTE LYMPH #: 0.32 K/UL (ref 1–4.8)
ABSOLUTE MONO #: 0.32 K/UL (ref 0.1–1.3)
BANDS: 18 % (ref 0–10)
BASOPHILS # BLD: 0 % (ref 0–2)
BASOPHILS ABSOLUTE: 0 K/UL (ref 0–0.2)
EOSINOPHILS RELATIVE PERCENT: 0 % (ref 0–4)
HCT VFR BLD CALC: 28.4 % (ref 41–53)
HEMOGLOBIN: 8.9 G/DL (ref 13.5–17.5)
LYMPHOCYTES # BLD: 8 % (ref 24–44)
MCH RBC QN AUTO: 29.4 PG (ref 26–34)
MCHC RBC AUTO-ENTMCNC: 31.3 G/DL (ref 31–37)
MCV RBC AUTO: 94.1 FL (ref 80–100)
MONOCYTES # BLD: 8 % (ref 1–7)
MORPHOLOGY: ABNORMAL
MRSA, DNA, NASAL: NEGATIVE
PDW BLD-RTO: 13 % (ref 11.5–14.9)
PLATELET # BLD: 167 K/UL (ref 150–450)
PMV BLD AUTO: 6.6 FL (ref 6–12)
RBC # BLD: 3.02 M/UL (ref 4.5–5.9)
SEG NEUTROPHILS: 66 % (ref 36–66)
SEGMENTED NEUTROPHILS ABSOLUTE COUNT: 2.64 K/UL (ref 1.3–9.1)
SPECIMEN DESCRIPTION: NORMAL
WBC # BLD: 4 K/UL (ref 3.5–11)

## 2023-01-05 PROCEDURE — 80048 BASIC METABOLIC PNL TOTAL CA: CPT

## 2023-01-05 PROCEDURE — 97161 PT EVAL LOW COMPLEX 20 MIN: CPT

## 2023-01-05 PROCEDURE — 85025 COMPLETE CBC W/AUTO DIFF WBC: CPT

## 2023-01-05 PROCEDURE — 6360000002 HC RX W HCPCS: Performed by: INTERNAL MEDICINE

## 2023-01-05 PROCEDURE — 6370000000 HC RX 637 (ALT 250 FOR IP): Performed by: INTERNAL MEDICINE

## 2023-01-05 PROCEDURE — 94660 CPAP INITIATION&MGMT: CPT

## 2023-01-05 PROCEDURE — 2580000003 HC RX 258: Performed by: INTERNAL MEDICINE

## 2023-01-05 PROCEDURE — 94640 AIRWAY INHALATION TREATMENT: CPT

## 2023-01-05 PROCEDURE — 36415 COLL VENOUS BLD VENIPUNCTURE: CPT

## 2023-01-05 PROCEDURE — 2060000000 HC ICU INTERMEDIATE R&B

## 2023-01-05 PROCEDURE — 2500000003 HC RX 250 WO HCPCS: Performed by: INTERNAL MEDICINE

## 2023-01-05 PROCEDURE — 2580000003 HC RX 258: Performed by: NURSE PRACTITIONER

## 2023-01-05 PROCEDURE — 6370000000 HC RX 637 (ALT 250 FOR IP): Performed by: NURSE PRACTITIONER

## 2023-01-05 PROCEDURE — 2700000000 HC OXYGEN THERAPY PER DAY

## 2023-01-05 PROCEDURE — 97166 OT EVAL MOD COMPLEX 45 MIN: CPT

## 2023-01-05 PROCEDURE — 94761 N-INVAS EAR/PLS OXIMETRY MLT: CPT

## 2023-01-05 PROCEDURE — 99233 SBSQ HOSP IP/OBS HIGH 50: CPT | Performed by: INTERNAL MEDICINE

## 2023-01-05 RX ORDER — TIOTROPIUM BROMIDE 18 UG/1
CAPSULE ORAL; RESPIRATORY (INHALATION)
Qty: 30 CAPSULE | Refills: 0 | Status: SHIPPED | OUTPATIENT
Start: 2023-01-05

## 2023-01-05 RX ORDER — GUAIFENESIN 100 MG/5ML
200 SYRUP ORAL EVERY 4 HOURS PRN
Status: DISCONTINUED | OUTPATIENT
Start: 2023-01-05 | End: 2023-01-06

## 2023-01-05 RX ORDER — TRAZODONE HYDROCHLORIDE 50 MG/1
50 TABLET ORAL NIGHTLY
Qty: 30 TABLET | Refills: 0 | Status: SHIPPED | OUTPATIENT
Start: 2023-01-05

## 2023-01-05 RX ORDER — METHYLPREDNISOLONE SODIUM SUCCINATE 40 MG/ML
40 INJECTION, POWDER, LYOPHILIZED, FOR SOLUTION INTRAMUSCULAR; INTRAVENOUS EVERY 8 HOURS
Status: DISCONTINUED | OUTPATIENT
Start: 2023-01-05 | End: 2023-01-08

## 2023-01-05 RX ORDER — BUDESONIDE AND FORMOTEROL FUMARATE DIHYDRATE 160; 4.5 UG/1; UG/1
AEROSOL RESPIRATORY (INHALATION)
Qty: 11 G | Refills: 0 | Status: SHIPPED | OUTPATIENT
Start: 2023-01-05

## 2023-01-05 RX ADMIN — IPRATROPIUM BROMIDE AND ALBUTEROL SULFATE 1 AMPULE: 2.5; .5 SOLUTION RESPIRATORY (INHALATION) at 20:02

## 2023-01-05 RX ADMIN — CEFEPIME 2000 MG: 2 INJECTION, POWDER, FOR SOLUTION INTRAVENOUS at 20:01

## 2023-01-05 RX ADMIN — FOLIC ACID 1 MG: 1 TABLET ORAL at 08:33

## 2023-01-05 RX ADMIN — METHYLPREDNISOLONE SODIUM SUCCINATE 40 MG: 40 INJECTION, POWDER, LYOPHILIZED, FOR SOLUTION INTRAMUSCULAR; INTRAVENOUS at 11:00

## 2023-01-05 RX ADMIN — IPRATROPIUM BROMIDE AND ALBUTEROL SULFATE 1 AMPULE: 2.5; .5 SOLUTION RESPIRATORY (INHALATION) at 16:00

## 2023-01-05 RX ADMIN — AMLODIPINE BESYLATE 10 MG: 10 TABLET ORAL at 08:33

## 2023-01-05 RX ADMIN — IPRATROPIUM BROMIDE AND ALBUTEROL SULFATE 1 AMPULE: 2.5; .5 SOLUTION RESPIRATORY (INHALATION) at 07:46

## 2023-01-05 RX ADMIN — METHYLPREDNISOLONE SODIUM SUCCINATE 40 MG: 40 INJECTION, POWDER, LYOPHILIZED, FOR SOLUTION INTRAMUSCULAR; INTRAVENOUS at 05:59

## 2023-01-05 RX ADMIN — SODIUM CHLORIDE: 9 INJECTION, SOLUTION INTRAVENOUS at 00:15

## 2023-01-05 RX ADMIN — METHYLPREDNISOLONE SODIUM SUCCINATE 40 MG: 40 INJECTION, POWDER, LYOPHILIZED, FOR SOLUTION INTRAMUSCULAR; INTRAVENOUS at 00:12

## 2023-01-05 RX ADMIN — ENOXAPARIN SODIUM 40 MG: 100 INJECTION SUBCUTANEOUS at 08:33

## 2023-01-05 RX ADMIN — THIAMINE HCL TAB 100 MG 100 MG: 100 TAB at 08:33

## 2023-01-05 RX ADMIN — CEFEPIME 2000 MG: 2 INJECTION, POWDER, FOR SOLUTION INTRAVENOUS at 06:00

## 2023-01-05 RX ADMIN — GUAIFENESIN 200 MG: 200 SOLUTION ORAL at 13:23

## 2023-01-05 RX ADMIN — AZITHROMYCIN DIHYDRATE 500 MG: 500 INJECTION, POWDER, LYOPHILIZED, FOR SOLUTION INTRAVENOUS at 20:07

## 2023-01-05 RX ADMIN — CEFEPIME 2000 MG: 2 INJECTION, POWDER, FOR SOLUTION INTRAVENOUS at 13:24

## 2023-01-05 RX ADMIN — GUAIFENESIN 200 MG: 200 SOLUTION ORAL at 20:04

## 2023-01-05 RX ADMIN — IPRATROPIUM BROMIDE AND ALBUTEROL SULFATE 1 AMPULE: 2.5; .5 SOLUTION RESPIRATORY (INHALATION) at 04:13

## 2023-01-05 RX ADMIN — METHYLPREDNISOLONE SODIUM SUCCINATE 40 MG: 40 INJECTION, POWDER, FOR SOLUTION INTRAMUSCULAR; INTRAVENOUS at 19:57

## 2023-01-05 ASSESSMENT — PAIN SCALES - GENERAL: PAINLEVEL_OUTOF10: 0

## 2023-01-05 NOTE — CARE COORDINATION
ONGOING DISCHARGE PLAN:    Patient is alert and oriented x4. Spoke with patient regarding discharge plan and patient confirms that plan is still to go home . Pt denies need for VNS. Sister in law wants patient to go to SNF/VNS  lists left at bedside. 97% sats on bipap FIO2 40 %, alternating with O2 at 5lpm NC. Pt wears O2 at 5lpm NC at home. IV cefepime, Solumedrol 40 mg Q6    Will continue to follow for additional discharge needs.     Electronically signed by Ann-Marie Weems RN on 1/5/2023 at 11:07 AM

## 2023-01-05 NOTE — PLAN OF CARE
Problem: Discharge Planning  Goal: Discharge to home or other facility with appropriate resources  1/4/2023 2211 by Emigdio Hayes RN  Outcome: Progressing  Flowsheets (Taken 1/4/2023 2000)  Discharge to home or other facility with appropriate resources: Arrange for needed discharge resources and transportation as appropriate  1/4/2023 0950 by Chas Baeza RN  Outcome: Progressing  1/4/2023 0949 by Chas Baeza RN  Outcome: Progressing     Problem: Safety - Adult  Goal: Free from fall injury  1/4/2023 2211 by Emigdio Hayes RN  Outcome: Progressing  1/4/2023 0950 by Chas Baeza RN  Outcome: Progressing  1/4/2023 0949 by Chas Baeza RN  Outcome: Progressing     Problem: Skin/Tissue Integrity  Goal: Absence of new skin breakdown  Description: 1. Monitor for areas of redness and/or skin breakdown  2. Assess vascular access sites hourly  3. Every 4-6 hours minimum:  Change oxygen saturation probe site  4. Every 4-6 hours:  If on nasal continuous positive airway pressure, respiratory therapy assess nares and determine need for appliance change or resting period.   Outcome: Progressing

## 2023-01-05 NOTE — PROGRESS NOTES
Physical Therapy  Facility/Department: [facility] ICU  Physical Therapy Initial Assessment    Name: India Adames  : 1965  MRN: [id number]  Date of Service: 2023    Discharge Recommendations: Other (comment) (may benefit from Pulmonary Rehab)   PT Equipment Recommendations  Equipment Needed: No      Patient Diagnosis(es): The primary encounter diagnosis was Acute on chronic respiratory failure with hypoxia and hypercapnia ([facility] [address] A diagnosis of Pneumonia due to COVID-19 virus was also pertinent to this visit. Past Medical History:  has a past medical history of COPD (chronic obstructive pulmonary disease) ([address] and Hypertension. Past Surgical History:  has no past surgical history on file. Assessment   Body Structures, Functions, Activity Limitations Requiring Skilled Therapeutic Intervention: Decreased functional mobility ; Decreased endurance;Decreased tolerance to work activity  Assessment: Impaired mobility due to decreased tolerance to activity  Decision Making: Low Complexity  History: COPD; Covid  Exam: decreased mobility, endurance  Clinical Presentation: evolving  Requires PT Follow-Up: Yes  Activity Tolerance  Activity Tolerance: Patient tolerated evaluation without incident;Patient limited by endurance     Plan   Physcial Therapy Plan  General Plan: 3-5 times per week  Current Treatment Recommendations: Functional mobility training, Gait training, Therapeutic activities, Endurance training  Safety Devices  Type of Devices: Call light within reach, Left in bed     Restrictions  Restrictions/Precautions  Restrictions/Precautions: Contact Precautions, General Precautions (5L O2, needed placed back on Bipap 2* SpO2 86% with EOB activity, anxious)  Required Braces or Orthoses?: No     Subjective   Pain: Pt denies pain.   General  Patient assessed for rehabilitation services?: Yes  Family / Caregiver Present: No  Follows Commands: Within Functional Limits  Subjective  Subjective: pt reports \" I can't do much\"         Social/Functional History  Social/Functional History  Lives With: Family (Daughter, ex-girlfriend, and granddaughter)  Type of Home: House  Home Layout: One level  Home Access: Ramped entrance  Bathroom Shower/Tub: Tub/Shower unit, Shower chair with back  Bathroom Toilet: Standard  Bathroom Equipment: Grab bars in shower, Hand-held shower, Toilet raiser, Grab bars around toilet  Bathroom Accessibility: Accessible  Home Equipment: Rollator, Oxygen (5L)  Has the patient had two or more falls in the past year or any fall with injury in the past year?: No  Receives Help From: Family  ADL Assistance: Independent  Homemaking Assistance: Independent (Pt does the cooking, states \"I do too much\")  Homemaking Responsibilities: Yes  Ambulation Assistance: Independent (No device)  Transfer Assistance: Independent  Active : No  Patient's  Info: Sister-in-law   Mode of Transportation: SUV  Occupation: On disability  IADL Comments: Sleeps in [address] chair at home. Additional Comments: Pt reports that ex-girlfriend is also on disability and that she will A him if needed upon discharge.   Vision/Hearing  Vision  Vision: Within Functional Limits  Hearing  Hearing: Within functional limits          Objective   Heart Rate: 90  Heart Rate Source: Monitor  BP: 108/65  BP Location: Right upper arm  BP Method: Automatic  Patient Position: Sitting  MAP (Calculated): 79  Resp: 12  SpO2: 98 %  O2 Device: PAP (positive airway pressure)     AROM RLE (degrees)  RLE AROM: WNL  AROM LLE (degrees)  LLE AROM : WNL  Strength RLE  Comment: grossly 4/5  Strength LLE  Comment: grossly 4/5      Bed mobility  Supine to Sit: Stand by assistance  Sit to Supine: Stand by assistance  Scooting: Stand by assistance  Transfers  Sit to Stand: Stand by assistance  Stand to Sit: Stand by assistance  Comment: pt stood bedside for approx 30sec and took2 side-steps        Balance  Sitting - Static: Good  Sitting - Dynamic: Good  Standing - Static: Fair;+ (SBA)  Standing - Dynamic: Fair;+ (SBA)         AM-PAC Score  AM-PAC Inpatient Mobility Raw Score : 14 (01/05/23 1121)  AM-PAC Inpatient T-Scale Score : 38.1 (01/05/23 1121)  Mobility Inpatient CMS 0-100% Score: 61.29 (01/05/23 1121)  Mobility Inpatient CMS G-Code Modifier : CL (01/05/23 1121)        Goals  Short Term Goals  Time Frame for Short Term Goals: 4-5 days  Short Term Goal 1: mod-I bed mobility  Short Term Goal 2: mod-I transfers  Short Term Goal 3: ambulate with SBA and O2 x 40-50ft  Short Term Goal 4: pt able to tolerate 20-25min og therapeutic activity/exercises without BiPap         Therapy Time   Individual Concurrent Group Co-treatment   Time In 1008         Time Out 1023         Minutes [address] [facility],  [address] Ryan Garcia, PT

## 2023-01-05 NOTE — PROGRESS NOTES
[address] [address]    HISTORY AND PHYSICAL EXAMINATION            Date:   1/5/2023  Patient name:  Alejandro Anderson  Date of admission:  1/3/2023  5:10 PM  MRN:   [id number]  Account:  [de-identified]  YOB: 1965  PCP:    Beatrice Cueva MD  Room:   2016/2016-01  Code Status:    Full Code    Chief Complaint:     Chief Complaint   Patient presents with    Respiratory Distress       History Obtained From:     patient, electronic medical record    History of Present Illness: The patient is a 61 y.o. Non- / non  male who presents with Respiratory Distress   and he is admitted to the hospital for the management of shortness of breath  Patient past medical history multiple medical problem which include hypertension, advanced COPD, chronic alcoholism, patient, was admitted with worsening shortness of breath, symptoms are going on for last few days before presentation  Patient apparently was not taking his medication  Was admitted at [facility] with [facility]  Patient denying complaints of chest pain  In emergency room COVID was positive, was cleared by pulmonary medicine team for droplet plus precautions  Patient in emergency room, was found to have pH of 7.2, PCO2 was too high to be recorded, remained on BiPAP overnight, repeat PCO2 is 91    1/5   Patient, clinically doing better  Did use BiPAP last night  On intermittent BiPAP  No new complaints      Past Medical History:     Past Medical History:   Diagnosis Date    COPD (chronic obstructive pulmonary disease) ([address]     Hypertension         Past Surgical History:     History reviewed. No pertinent surgical history. Medications Prior to Admission:     Prior to Admission medications    Medication Sig Start Date End Date Taking?  Authorizing Provider   escitalopram (LEXAPRO) 10 MG tablet Take 1 tablet by mouth daily  Patient not taking: Reported on 1/3/2023 6/22/22   Beatrice Cueva MD folic acid (FOLVITE) 1 MG tablet Take 1 tablet by mouth daily 6/22/22   Ursula Knutson MD   SYMBICORT 160-4.5 MCG/ACT AERO Inhale 2 puffs into the lungs daily 6/22/22   Ursula Knutson MD   thiamine 100 MG tablet Take 1 tablet by mouth daily 6/22/22   Ursula Knutson MD   tiotropium (Uvaldo Clunes) 18 MCG inhalation capsule Inhale 1 capsule into the lungs daily 6/22/22   Ursula Knutson MD   traZODone (DESYREL) 50 MG tablet Take 1 tablet by mouth nightly  Patient not taking: Reported on 1/3/2023 6/22/22   Ursula Knutson MD   vitamin D (ERGOCALCIFEROL) 1.25 MG (99279 UT) CAPS capsule Take 1 capsule by mouth once a week 6/22/22   Ursula Knutson MD   albuterol (PROVENTIL) (2.5 MG/3ML) 0.083% nebulizer solution Take 3 mLs by nebulization 4 times daily 6/7/22   Mahi Carpio DO   amLODIPine (NORVASC) 10 MG tablet Take 1 tablet by mouth daily 3/25/22   Ursula Knutson MD        Allergies:     Patient has no known allergies. Social History:     Tobacco:    reports that he has been smoking cigarettes. He has a 43.00 pack-year smoking history. He has never used smokeless tobacco.  Alcohol:      reports current alcohol use. Drug Use:  reports that he does not currently use drugs. Family History:     History reviewed. No pertinent family history. Review of Systems:     Positive and Negative as described in HPI. CONSTITUTIONAL:  negative for fevers, chills, sweats, fatigue, weight loss  HEENT:  negative for vision, hearing changes, runny nose, throat pain  RESPIRATORY: Shortness of breath, cough  CARDIOVASCULAR:  negative for chest pain, palpitations.   Patient has pain on right side of lower chest which worse with coughing  GASTROINTESTINAL:  negative for nausea, vomiting, diarrhea, constipation, change in bowel habits, abdominal pain   GENITOURINARY:  negative for difficulty of urination, burning with urination, frequency   INTEGUMENT:  negative for rash, skin lesions, easy bruising HEMATOLOGIC/LYMPHATIC:  negative for swelling/edema   ALLERGIC/IMMUNOLOGIC:  negative for urticaria , itching  ENDOCRINE:  negative increase in drinking, increase in urination, hot or cold intolerance  MUSCULOSKELETAL:  negative joint pains, muscle aches, swelling of joints, clubbing present   NEUROLOGICAL:  negative for headaches, dizziness, lightheadedness, numbness, pain, tingling extremities  BEHAVIOR/PSYCH:  negative for depression, anxiety    Physical Exam:   /65   Pulse 90   Temp 98.5 °F (36.9 °C) (Oral)   Resp 12   Ht 5' 7.72\" (1.72 m)   Wt 143 lb 11.8 oz (65.2 kg)   SpO2 98%   BMI 22.04 kg/m²   Temp (24hrs), Av °F (36.7 °C), Min:97 °F (36.1 °C), Max:98.6 °F (37 °C)    No results for input(s): POCGLU in the last 72 hours. Intake/Output Summary (Last 24 hours) at 2023 1041  Last data filed at 2023 0433  Gross per 24 hour   Intake 1866.92 ml   Output 200 ml   Net 1666.92 ml       General Appearance:  alert, well appearing, and in no acute distress  Mental status: oriented to person, place, and time with normal affect  Head:  normocephalic, atraumatic. Eye: no icterus, redness, pupils equal and reactive, extraocular eye movements intact, conjunctiva clear  Ear: normal external ear, no discharge, hearing intact  Nose:  no drainage noted  Mouth: mucous membranes moist  Neck: supple, no carotid bruits, thyroid not palpable  Lungs: Air entry, bilateral decreased, no rales, rhonchi  Cardiovascular: normal rate, regular rhythm, no murmur, gallop, rub.   Abdomen: Soft, nontender, nondistended, normal bowel sounds, no hepatomegaly or splenomegaly  Neurologic: There are no new focal motor or sensory deficits, normal muscle tone and bulk, no abnormal sensation, normal speech, cranial nerves II through XII grossly intact  Skin: No gross lesions, rashes, bruising or bleeding on exposed skin area  Extremities:  peripheral pulses palpable, no pedal edema or calf pain with palpation  Psych: normal affect     Investigations:      Laboratory Testing:  Recent Results (from the past 24 hour(s))   Basic Metabolic Panel w/ Reflex to MG    Collection Time: 01/05/23  3:37 AM   Result Value Ref Range    Glucose 128 (H) 70 - 99 mg/dL    BUN 18 6 - 20 mg/dL    Creatinine <0.40 (L) 0.70 - 1.20 mg/dL    Est, Glom Filt Rate Can not be calculated >60 mL/min/1.73m2    Calcium 8.6 8.6 - 10.4 mg/dL    Sodium 139 135 - 144 mmol/L    Potassium 4.3 3.7 - 5.3 mmol/L    Chloride 95 (L) 98 - 107 mmol/L    CO2 45 (HH) 20 - 31 mmol/L    Anion Gap PENDING mmol/L   CBC with Auto Differential    Collection Time: 01/05/23  3:37 AM   Result Value Ref Range    WBC 4.0 3.5 - 11.0 k/uL    RBC 3.02 (L) 4.5 - 5.9 m/uL    Hemoglobin 8.9 (L) 13.5 - 17.5 g/dL    Hematocrit 28.4 (L) 41 - 53 %    MCV 94.1 80 - 100 fL    MCH 29.4 26 - 34 pg    MCHC 31.3 31 - 37 g/dL    RDW 13.0 11.5 - 14.9 %    Platelets 396 288 - 216 k/uL    MPV 6.6 6.0 - 12.0 fL    Seg Neutrophils 66 36 - 66 %    Lymphocytes 8 (L) 24 - 44 %    Monocytes 8 (H) 1 - 7 %    Eosinophils % 0 0 - 4 %    Basophils 0 0 - 2 %    Bands 18 (H) 0 - 10 %    Segs Absolute 2.64 1.3 - 9.1 k/uL    Absolute Lymph # 0.32 (L) 1.0 - 4.8 k/uL    Absolute Mono # 0.32 0.1 - 1.3 k/uL    Absolute Eos # 0.00 0.0 - 0.4 k/uL    Basophils Absolute 0.00 0.0 - 0.2 k/uL    Absolute Bands # 0.72 0.0 - 1.0 k/uL    Morphology TOXIC GRANULATION PRESENT        Imaging/Diagnostics:      Assessment :      Primary Problem  COPD exacerbation (HCC)    Active Hospital Problems    Diagnosis Date Noted    COPD exacerbation ([facility] [J44.1] 06/22/2022     Priority: Medium       Plan:     Patient status Admit as inpatient in the  Medical ICU    Advanced COPD,, admitted with worsening shortness of breath,  Acute hypercapnic respiratory failure, on BiPAP, steroids, DuoNeb nebulization  Hypertension, restarted home dose of Norvasc, controlled  On DVT prophylaxis Lovenox  Patient is clubbing, CT chest done in 6/22 , which was negative   Overall prognosis guarded, given advanced COPD, chronic hypoxic respiratory failure  Patient had a left renal mass, which was seen in CT scan in June of this year, need urology input as outpatient  1/5   Patient feeling better  Complaining of cough and dryness of mouth with continuous BiPAP  Started on cough medication  Much improved  Will discuss with pulmonary medicine team, likely transfer out of ICU  Consultations:   [address]    Patient is admitted as inpatient status because of co-morbidities listed above, severity of signs and symptoms as outlined, requirement for current medical therapies and most importantly because of direct risk to patient if care not provided in a hospital setting. Kaye Osborn MD  1/5/2023  10:41 AM    Copy sent to Dr. Carrie Morse MD    Please note that this chart was generated using voice recognition Dragon dictation software. Although every effort was made to ensure the accuracy of this automated transcription, some errors in transcription may have occurred.

## 2023-01-05 NOTE — PROGRESS NOTES
Physician Progress Note      PATIENT:               Isidro Leyva  CSN #:                  [id number]  :                       1965  ADMIT DATE:       1/3/2023 5:10 PM  DISCH DATE:  RESPONDING  PROVIDER #:        Darius Walsh MD          QUERY TEXT:    Patient admitted with COPD exacerbation with respiratory failure. Patient   with recent Covid infection and noted positive Covid test on  at [place] [address] and 1/3 at [facility]. If possible, please document in progress notes   and discharge summary if patient has an active Covid-19 infection or if   patient is testing positive for Covid-19 without a current active infection: The medical record reflects the following:  Risk Factors: PMH of recent admission at [facility] for COVID-19 infection. Clinical Indicators: In the setting of above risk factors, ED Provider Note   1/3: Final Impression: Pneumonia due to COVID-19 virus. H&P : Was admitted   at [facility] with [facility]. In emergency room COVID was positive, was   cleared by pulmonary medicine team for droplet plus precautions. Pulm Consult   1/3: COVID test positive. Recent hospitalization at [facility] for   COVID-19. Positive test . CXR 1/3: Negative chest radiograph. WBCs/Bands: 4.6/12 (1/3), 3.7/44 (/), 4.0/18 (/). Procalcitonin 0.14   (1/3). .4 (1/3). COVID detected 1/3. Treatment: Pulm Consult, Supplemental O2, IV steroids. Options provided:  -- Active Covid-19 infection is being treated and/or evaluated on current   encounter  -- Positive Covid test result without an active current Covid-19 infection  -- Other - I will add my own diagnosis  -- Disagree - Not applicable / Not valid  -- Disagree - Clinically unable to determine / Unknown  -- Refer to Clinical Documentation Reviewer    PROVIDER RESPONSE TEXT:    Patient is testing positive for Covid without an active Covid-19 infection.     Query created by: Michelle Horvath on 2023 8:13 AM      Electronically signed by:  Daniel Ellison MD 1/5/2023 2:22 PM

## 2023-01-05 NOTE — PROGRESS NOTES
ICU Progress Note (Non-Vent)  [facility]    Patient - Svetlana Malik,  Age - 61 y.o.    - 1965      Room Number -    MRN -  [id number]   Acct # - [de-identified]  Date of Admission -  1/3/2023  5:10 PM    Events of Past 24 Hours   He says he feels better, used BiPAP overnight and is taking a break now to eat    Vitals    height is 5' 7.72\" (1.72 m) and weight is 143 lb 11.8 oz (65.2 kg). His oral temperature is 98.5 °F (36.9 °C). His blood pressure is 108/65 and his pulse is 90. His respiration is 12 and oxygen saturation is 98%.        Temperature Range: Temp: 98.5 °F (36.9 °C) Temp  Av.8 °F (36.6 °C)  Min: 97 °F (36.1 °C)  Max: 98.5 °F (36.9 °C)  BP Range:  Systolic (85VXJ), FEC:385 , Min:99 , AEQ:079     Diastolic (45AEA), UWU:41, Min:47, Max:96    Pulse Range: Pulse  Av.9  Min: 79  Max: 107  Respiration Range: Resp  Av.3  Min: 12  Max: 27  Current Pulse Ox[de-identified]  SpO2: 98 %  24HR Pulse Ox Range:  SpO2  Av.5 %  Min: 84 %  Max: 98 %  Oxygen Amount and Delivery: O2 Flow Rate (L/min): 6 L/min    Wt Readings from Last 3 Encounters:   23 143 lb 11.8 oz (65.2 kg)   22 154 lb 1.6 oz (69.9 kg)   22 140 lb 6.4 oz (63.7 kg)     I/O     Intake/Output Summary (Last 24 hours) at 2023 1256  Last data filed at 2023 0433  Gross per 24 hour   Intake 1866.92 ml   Output 200 ml   Net 1666.92 ml     DRAIN/TUBE OUTPUT       Invasive Lines   ICP PRESSURE RANGE  No data recorded  CVP PRESSURE RANGE  No data recorded      Medications      ipratropium-albuterol  1 ampule Inhalation Q4H    enoxaparin  40 mg SubCUTAneous Daily    azithromycin  500 mg IntraVENous Q24H    cefepime  2,000 mg IntraVENous Q8H    amLODIPine  10 mg Oral Daily    folic acid  1 mg Oral Daily    budesonide-formoterol  2 puff Inhalation Daily    thiamine mononitrate  100 mg Oral Daily    sodium chloride flush 5-40 mL IntraVENous 2 times per day    methylPREDNISolone  40 mg IntraVENous Q6H     guaiFENesin, ipratropium-albuterol, sodium chloride flush, sodium chloride, ondansetron **OR** ondansetron, polyethylene glycol, acetaminophen **OR** acetaminophen  IV Drips/Infusions   sodium chloride      sodium chloride 75 mL/hr at 01/05/23 0433       Diet/Nutrition   ADULT DIET; Regular; 4 carb choices (60 gm/meal)    Exam      Constitutional - Alert, arousable  General Appearance underdeveloped  HEENT -normocephalic, atraumatic. PERRLA  Lungs - Chest expands equally, no wheezes, rales or rhonchi. Improved aeration  Cardiovascular - Heart sounds are normal.  normal rate and rhythm regular, no murmur, gallop or rub. Abdomen - soft, nontender, nondistended, no masses or organomegaly  Neurologic - CN II-XII are grossly intact.  There are no focal motor deficits  Skin - no bruising or bleeding  Extremities - no cyanosis, clubbing or edema    Lab Results   CBC     Lab Results   Component Value Date/Time    WBC 4.0 01/05/2023 03:37 AM    RBC 3.02 01/05/2023 03:37 AM    HGB 8.9 01/05/2023 03:37 AM    HCT 28.4 01/05/2023 03:37 AM     01/05/2023 03:37 AM    MCV 94.1 01/05/2023 03:37 AM    MCH 29.4 01/05/2023 03:37 AM    MCHC 31.3 01/05/2023 03:37 AM    RDW 13.0 01/05/2023 03:37 AM    LYMPHOPCT 8 01/05/2023 03:37 AM    MONOPCT 8 01/05/2023 03:37 AM    BASOPCT 0 01/05/2023 03:37 AM    MONOSABS 0.32 01/05/2023 03:37 AM    LYMPHSABS 0.32 01/05/2023 03:37 AM    EOSABS 0.00 01/05/2023 03:37 AM    BASOSABS 0.00 01/05/2023 03:37 AM    DIFFTYPE NOT REPORTED 01/28/2022 10:12 AM       BMP   Lab Results   Component Value Date/Time     01/05/2023 03:37 AM    K 4.3 01/05/2023 03:37 AM    CL 95 01/05/2023 03:37 AM    CO2 45 01/05/2023 03:37 AM    BUN 18 01/05/2023 03:37 AM    CREATININE <0.40 01/05/2023 03:37 AM    GLUCOSE 128 01/05/2023 03:37 AM       LFTS  Lab Results   Component Value Date/Time    ALKPHOS 79 06/22/2022 03:43 PM    ALT 8 06/22/2022 03:43 PM    AST 10 06/22/2022 03:43 PM    PROT 6.6 06/22/2022 03:43 PM    BILITOT 0.19 06/22/2022 03:43 PM    LABALBU 4.0 06/22/2022 03:43 PM       ABG ABGs:   Lab Results   Component Value Date/Time    PHART 7.317 01/03/2023 06:34 PM    PO2ART 67.8 01/03/2023 06:34 PM    QSJ4STR 92.8 01/03/2023 06:34 PM       Lab Results   Component Value Date/Time    MODE IPAP18/EPAP8 01/03/2023 06:34 PM         INR  No results for input(s): PROTIME, INR in the last 72 hours. APTT  No results for input(s): APTT in the last 72 hours. Lactic Acid  No results found for: LACTA     BNP   No results for input(s): BNP in the last 72 hours.      Cultures       Radiology     CXR      CT Scans    (See actual reports for details)      SYSTEMS ASSESSMENT    Acute on chronic hypoxic and hypercapnic respiratory failure  Severe's COPD clinically with acute exacerbation  History of tobacco use  History of recent COVID infection early December at [facility]  Full code    Has improved with more aggressive therapy  Will wean his Solu-Medrol to 40 every 8  Continue BiPAP at night and as needed during the day  DuoNeb aerosols every 4 hours  Needs mobilization  Okay to transfer to progressive unit  Finishing up course of his Zithromax, continue cefepime    Critical Care Time   0 min    Electronically signed by Stefan Eason MD on 1/5/2023 at 12:56 PM

## 2023-01-05 NOTE — PROGRESS NOTES
[address] [facility]    Occupational Therapy Evaluation  Date: 23  Patient Name: Viral Drake       Room:   MRN: [id number]  Account: [de-identified]   : 1965  (58 y.o.) Gender: male     Discharge Recommendations:  Further Occupational Therapy is recommended upon facility discharge. OT Equipment Recommendations  Other: TBD    Referring Practitioner: Dr. Jono Newsome  Diagnosis: COPD exacerbation, acute on chronic respiratory failure with hypoxia and hypercapnia, pneumonia due to COVID-19 Additional Pertinent Hx: Diagnosed with COVID early 2023, COPD    Treatment Diagnosis: Impaired self-care status    Past Medical History:  has a past medical history of COPD (chronic obstructive pulmonary disease) ([address] and Hypertension. Past Surgical History:   has no past surgical history on file. Restrictions  Restrictions/Precautions  Restrictions/Precautions: Contact Precautions;General Precautions (5L O2, needed placed back on Bipap 2* SpO2 86% with EOB activity, anxious)  Required Braces or Orthoses?: No      Vitals  Vitals  Heart Rate: 90  Heart Rate Source: Monitor  BP: 108/65  BP Location: Right upper arm  BP Method: Automatic  Patient Position: Sitting  MAP (Calculated): 79  Resp: 12  SpO2: 98 %  O2 Device: PAP (positive airway pressure)     Subjective  Subjective: \"Where is it at now? Is it going up? \"  Comments: Pt SOB while sitting EOB and very focused on SpO2 readings on monitor which were 86-88%, eventually RN placed pt back on Bipap and his SpO2 increased to 94% once resting in bed. Subjective  Pain: Pt denies pain.     Social/Functional History  Social/Functional History  Lives With: Family (Daughter, ex-girlfriend, and granddaughter)  Type of Home: House  Home Layout: One level  Home Access: Ramped entrance  Bathroom Shower/Tub: Tub/Shower unit, Shower chair with back  [employer]: Standard  Bathroom Equipment: Grab bars in shower, Hand-held shower, Toilet raiser, Grab bars around toilet  Bathroom Accessibility: Accessible  Home Equipment: Rollator, Oxygen (5L)  Has the patient had two or more falls in the past year or any fall with injury in the past year?: No  Receives Help From: Family  ADL Assistance: Independent  Homemaking Assistance: Independent (Pt does the cooking, states \"I do too much\")  Homemaking Responsibilities: Yes  Ambulation Assistance: Independent (No device)  Transfer Assistance: Independent  Active : No  Patient's  Info: Sister-in-law   Mode of Transportation: SUV  Occupation: On disability  IADL Comments: Sleeps in recliner chair at home. Additional Comments: Pt reports that ex-girlfriend is also on disability and that she will A him if needed upon discharge. Objective  Orientation  Overall Orientation Status: Within Functional Limits  Cognition  Overall Cognitive Status: WFL     ADL  Feeding: Setup, Supervision  Feeding Skilled Clinical Factors: Pt gets SOB when eating, per RN report he did not eat lunch yesterday because he could not tolerate being off of the Bipap. Grooming: Stand by assistance  UE Bathing: Stand by assistance  LE Bathing: Stand by assistance  UE Dressing: Stand by assistance  LE Dressing: Stand by assistance  Toileting: Stand by assistance  Additional Comments: Above levels based on pt report, observation, and clinical reasoning. Although pt able to complete each task, likely unable to tolerate full ADL 2* SOB and decreased SpO2 with minimal EOB activity. Pt would require a significant amount of time and rest breaks. Gross Assessment  AROM: Within functional limits  Strength:  Within functional limits  Coordination: Within functional limits  Tone: Normal    Bed Mobility  Bed mobility  Supine to Sit: Stand by assistance  Sit to Supine: Stand by assistance  Scooting: Stand by assistance    Balance  Balance  Sitting Balance: Stand by assistance  Standing Balance: Stand by assistance    Transfers  Transfers  Stand Step Transfers: Stand by assistance  Stand Pivot Transfers: Stand by assistance  Sit to stand: Stand by assistance  Stand to sit: Stand by assistance  Transfer Comments: No device, stood at EOB and took a few steps towards the HOB, SBA for line mgt, no LOB or safety concerns, SpO2 90%+ while on Bipap    Functional Mobility  Functional Mobility Comments: Mobility deferred 2* pt SpO2 decreased to 86% at EOB with no improvement after several minutes, RN placed pt back on Bipap    Assessment  Assessment  Performance deficits / Impairments: Decreased functional mobility , Decreased ADL status, Decreased endurance, Decreased high-level IADLs  Treatment Diagnosis: Impaired self-care status  Prognosis: Fair  Decision Making: Medium Complexity  Discharge Recommendations: Patient would benefit from continued therapy after discharge    Activity Tolerance  Activity Tolerance: Patient limited by fatigue, Treatment limited secondary to medical complications (free text)  Activity Tolerance Comments: SpO2 decreased to 86% with EOB activity, no improvement with pursed lip breathing, RN placed pt back on Bipap with SpO2 recovery to 94%    Safety Devices  Type of Devices: Left in bed, Call light within reach, Nurse notified    Patient Education  Patient Education  Education Given To: Patient  Education Provided: Role of Therapy, Plan of Care, Energy Conservation  Education Method: Verbal  Barriers to Learning: None  Education Outcome: Verbalized understanding, Continued education needed    Functional Outcome Measures  AM-PAC Daily Activity Inpatient   How much help for putting on and taking off regular lower body clothing?: A Little  How much help for Bathing?: A Little  How much help for Toileting?: A Little  How much help for putting on and taking off regular upper body clothing?: A Little  How much help for taking care of personal grooming?: A Little  How much help for eating meals?: A Little  AM-PAC Inpatient Daily Activity Raw Score: 18  AM-PAC Inpatient ADL T-Scale Score : 38.66  ADL Inpatient CMS 0-100% Score: 46.65  ADL Inpatient CMS G-Code Modifier : CK    Goals  Patient Goals   Patient goals : None voiced  Short Term Goals  Time Frame for Short Term Goals: By Discharge  Short Term Goal 1: Pt will actively participate in self-care routine at EOB or up to chair while maintaining SpO2 90%+ and taking <3 rest breaks. Short Term Goal 2: Pt will complete toilet transfer and toileting with SUP and Good safety using least restrictive device and maintaining SpO2 90%+. Short Term Goal 3: Pt will V/D at least EC/WS techniques that he can utilize during daily routines. Short Term Goal 4: Pt will tolerate standing for 3-4 minutes with 0-1 UE support and SUP while participating in a functional task and maintaining SpO2 90%+. Short Term Goal 5: Pt will actively participate in 15-20 minutes of therapeutic exercise/activity to promote increased independence and safety with self-care and mobility. Plan  Occupational Therapy Plan  Times Per Week: 5  Times Per Day:  Once a day    OT Individual Minutes  OT Individual Minutes  Time In: 1009  Time Out: [address] Ellis  Minutes: 14    Electronically signed by ALPA Del Toro on 1/5/23 at 10:57 AM EST

## 2023-01-05 NOTE — PROGRESS NOTES
provided visitation for patient. However, patient was using the restroom. Unable to assess patient.  provided prayer for the patient outside in the hallway.     01/05/23 1618   Encounter Summary   Encounter Overview/Reason  Spiritual/Emotional Needs   Service Provided For: Patient   [facility]   Support System Unknown   Last Encounter  01/05/23   Begin Time 0249   End Time  0251   Total Time Calculated 2 min   Spiritual/Emotional needs   Type Spiritual Support   Assessment/Intervention/Outcome   Assessment Unable to assess   Intervention Sustaining Presence/Ministry of presence;Prayer (assurance of)/[place]   Outcome   (Patient was prayed outside of bedroom.)

## 2023-01-06 ENCOUNTER — APPOINTMENT (OUTPATIENT)
Dept: GENERAL RADIOLOGY | Age: 58
DRG: 140 | End: 2023-01-06
Payer: COMMERCIAL

## 2023-01-06 LAB
ABSOLUTE BANDS #: 0.05 K/UL (ref 0–1)
ABSOLUTE EOS #: 0 K/UL (ref 0–0.4)
ABSOLUTE LYMPH #: 0.56 K/UL (ref 1–4.8)
ABSOLUTE MONO #: 0.15 K/UL (ref 0.1–1.3)
ANION GAP SERPL CALCULATED.3IONS-SCNC: 4 MMOL/L (ref 9–17)
ANION GAP SERPL CALCULATED.3IONS-SCNC: 4 MMOL/L (ref 9–17)
ANION GAP SERPL CALCULATED.3IONS-SCNC: ABNORMAL MMOL/L (ref 9–17)
BANDS: 1 % (ref 0–10)
BASOPHILS # BLD: 0 % (ref 0–2)
BASOPHILS ABSOLUTE: 0 K/UL (ref 0–0.2)
BUN BLDV-MCNC: 11 MG/DL (ref 6–20)
BUN BLDV-MCNC: 14 MG/DL (ref 6–20)
BUN BLDV-MCNC: 18 MG/DL (ref 6–20)
CALCIUM SERPL-MCNC: 8.4 MG/DL (ref 8.6–10.4)
CALCIUM SERPL-MCNC: 8.6 MG/DL (ref 8.6–10.4)
CALCIUM SERPL-MCNC: 8.6 MG/DL (ref 8.6–10.4)
CHLORIDE BLD-SCNC: 86 MMOL/L (ref 98–107)
CHLORIDE BLD-SCNC: 90 MMOL/L (ref 98–107)
CHLORIDE BLD-SCNC: 92 MMOL/L (ref 98–107)
CO2: 42 MMOL/L (ref 20–31)
CO2: 43 MMOL/L (ref 20–31)
CO2: >45 MMOL/L (ref 20–31)
CREAT SERPL-MCNC: 0.64 MG/DL (ref 0.7–1.2)
CREAT SERPL-MCNC: <0.4 MG/DL (ref 0.7–1.2)
CREAT SERPL-MCNC: <0.4 MG/DL (ref 0.7–1.2)
EOSINOPHILS RELATIVE PERCENT: 0 % (ref 0–4)
GFR SERPL CREATININE-BSD FRML MDRD: >60 ML/MIN/1.73M2
GFR SERPL CREATININE-BSD FRML MDRD: ABNORMAL ML/MIN/1.73M2
GFR SERPL CREATININE-BSD FRML MDRD: ABNORMAL ML/MIN/1.73M2
GLUCOSE BLD-MCNC: 128 MG/DL (ref 70–99)
GLUCOSE BLD-MCNC: 143 MG/DL (ref 70–99)
GLUCOSE BLD-MCNC: 198 MG/DL (ref 70–99)
HCT VFR BLD CALC: 31 % (ref 41–53)
HEMOGLOBIN: 9.9 G/DL (ref 13.5–17.5)
LYMPHOCYTES # BLD: 11 % (ref 24–44)
MAGNESIUM: 2.1 MG/DL (ref 1.6–2.6)
MCH RBC QN AUTO: 30.1 PG (ref 26–34)
MCHC RBC AUTO-ENTMCNC: 32 G/DL (ref 31–37)
MCV RBC AUTO: 94.1 FL (ref 80–100)
MONOCYTES # BLD: 3 % (ref 1–7)
MORPHOLOGY: ABNORMAL
PDW BLD-RTO: 13 % (ref 11.5–14.9)
PLATELET # BLD: 203 K/UL (ref 150–450)
PMV BLD AUTO: 6.7 FL (ref 6–12)
POTASSIUM SERPL-SCNC: 4.3 MMOL/L (ref 3.7–5.3)
POTASSIUM SERPL-SCNC: 4.5 MMOL/L (ref 3.7–5.3)
POTASSIUM SERPL-SCNC: 4.6 MMOL/L (ref 3.7–5.3)
PRO-BNP: 397 PG/ML
RBC # BLD: 3.29 M/UL (ref 4.5–5.9)
SEG NEUTROPHILS: 85 % (ref 36–66)
SEGMENTED NEUTROPHILS ABSOLUTE COUNT: 4.34 K/UL (ref 1.3–9.1)
SODIUM BLD-SCNC: 135 MMOL/L (ref 135–144)
SODIUM BLD-SCNC: 136 MMOL/L (ref 135–144)
SODIUM BLD-SCNC: 139 MMOL/L (ref 135–144)
TROPONIN, HIGH SENSITIVITY: 6 NG/L (ref 0–22)
WBC # BLD: 5.1 K/UL (ref 3.5–11)

## 2023-01-06 PROCEDURE — 71045 X-RAY EXAM CHEST 1 VIEW: CPT

## 2023-01-06 PROCEDURE — 6360000002 HC RX W HCPCS: Performed by: INTERNAL MEDICINE

## 2023-01-06 PROCEDURE — 99233 SBSQ HOSP IP/OBS HIGH 50: CPT | Performed by: INTERNAL MEDICINE

## 2023-01-06 PROCEDURE — 6370000000 HC RX 637 (ALT 250 FOR IP): Performed by: INTERNAL MEDICINE

## 2023-01-06 PROCEDURE — 94761 N-INVAS EAR/PLS OXIMETRY MLT: CPT

## 2023-01-06 PROCEDURE — 2500000003 HC RX 250 WO HCPCS: Performed by: INTERNAL MEDICINE

## 2023-01-06 PROCEDURE — 80048 BASIC METABOLIC PNL TOTAL CA: CPT

## 2023-01-06 PROCEDURE — 6370000000 HC RX 637 (ALT 250 FOR IP): Performed by: NURSE PRACTITIONER

## 2023-01-06 PROCEDURE — 83735 ASSAY OF MAGNESIUM: CPT

## 2023-01-06 PROCEDURE — 2060000000 HC ICU INTERMEDIATE R&B

## 2023-01-06 PROCEDURE — 83880 ASSAY OF NATRIURETIC PEPTIDE: CPT

## 2023-01-06 PROCEDURE — 84484 ASSAY OF TROPONIN QUANT: CPT

## 2023-01-06 PROCEDURE — 2580000003 HC RX 258: Performed by: INTERNAL MEDICINE

## 2023-01-06 PROCEDURE — 94660 CPAP INITIATION&MGMT: CPT

## 2023-01-06 PROCEDURE — 93005 ELECTROCARDIOGRAM TRACING: CPT | Performed by: INTERNAL MEDICINE

## 2023-01-06 PROCEDURE — 85025 COMPLETE CBC W/AUTO DIFF WBC: CPT

## 2023-01-06 PROCEDURE — 94640 AIRWAY INHALATION TREATMENT: CPT

## 2023-01-06 PROCEDURE — 36415 COLL VENOUS BLD VENIPUNCTURE: CPT

## 2023-01-06 PROCEDURE — 2700000000 HC OXYGEN THERAPY PER DAY

## 2023-01-06 RX ORDER — DEXTROMETHORPHAN POLISTIREX 30 MG/5ML
60 SUSPENSION ORAL EVERY 12 HOURS SCHEDULED
Status: DISCONTINUED | OUTPATIENT
Start: 2023-01-06 | End: 2023-01-19 | Stop reason: HOSPADM

## 2023-01-06 RX ORDER — GUAIFENESIN 600 MG/1
1200 TABLET, EXTENDED RELEASE ORAL 2 TIMES DAILY
Status: DISCONTINUED | OUTPATIENT
Start: 2023-01-06 | End: 2023-01-19 | Stop reason: HOSPADM

## 2023-01-06 RX ADMIN — AMLODIPINE BESYLATE 10 MG: 10 TABLET ORAL at 07:43

## 2023-01-06 RX ADMIN — BUDESONIDE AND FORMOTEROL FUMARATE DIHYDRATE 2 PUFF: 160; 4.5 AEROSOL RESPIRATORY (INHALATION) at 07:42

## 2023-01-06 RX ADMIN — CEFEPIME 2000 MG: 2 INJECTION, POWDER, FOR SOLUTION INTRAVENOUS at 03:16

## 2023-01-06 RX ADMIN — METHYLPREDNISOLONE SODIUM SUCCINATE 40 MG: 40 INJECTION, POWDER, FOR SOLUTION INTRAMUSCULAR; INTRAVENOUS at 19:24

## 2023-01-06 RX ADMIN — CEFEPIME 2000 MG: 2 INJECTION, POWDER, FOR SOLUTION INTRAVENOUS at 19:22

## 2023-01-06 RX ADMIN — IPRATROPIUM BROMIDE AND ALBUTEROL SULFATE 1 AMPULE: 2.5; .5 SOLUTION RESPIRATORY (INHALATION) at 03:36

## 2023-01-06 RX ADMIN — ENOXAPARIN SODIUM 40 MG: 100 INJECTION SUBCUTANEOUS at 07:43

## 2023-01-06 RX ADMIN — GUAIFENESIN 200 MG: 200 SOLUTION ORAL at 06:16

## 2023-01-06 RX ADMIN — CEFEPIME 2000 MG: 2 INJECTION, POWDER, FOR SOLUTION INTRAVENOUS at 12:50

## 2023-01-06 RX ADMIN — Medication 60 MG: at 19:24

## 2023-01-06 RX ADMIN — METHYLPREDNISOLONE SODIUM SUCCINATE 40 MG: 40 INJECTION, POWDER, FOR SOLUTION INTRAMUSCULAR; INTRAVENOUS at 12:44

## 2023-01-06 RX ADMIN — GUAIFENESIN 1200 MG: 600 TABLET, EXTENDED RELEASE ORAL at 12:44

## 2023-01-06 RX ADMIN — FOLIC ACID 1 MG: 1 TABLET ORAL at 07:43

## 2023-01-06 RX ADMIN — IPRATROPIUM BROMIDE AND ALBUTEROL SULFATE 1 AMPULE: 2.5; .5 SOLUTION RESPIRATORY (INHALATION) at 11:02

## 2023-01-06 RX ADMIN — IPRATROPIUM BROMIDE AND ALBUTEROL SULFATE 1 AMPULE: 2.5; .5 SOLUTION RESPIRATORY (INHALATION) at 19:18

## 2023-01-06 RX ADMIN — GUAIFENESIN 1200 MG: 600 TABLET, EXTENDED RELEASE ORAL at 19:24

## 2023-01-06 RX ADMIN — THIAMINE HCL TAB 100 MG 100 MG: 100 TAB at 07:43

## 2023-01-06 RX ADMIN — METHYLPREDNISOLONE SODIUM SUCCINATE 40 MG: 40 INJECTION, POWDER, FOR SOLUTION INTRAMUSCULAR; INTRAVENOUS at 03:14

## 2023-01-06 RX ADMIN — IPRATROPIUM BROMIDE AND ALBUTEROL SULFATE 1 AMPULE: 2.5; .5 SOLUTION RESPIRATORY (INHALATION) at 15:00

## 2023-01-06 RX ADMIN — IPRATROPIUM BROMIDE AND ALBUTEROL SULFATE 1 AMPULE: 2.5; .5 SOLUTION RESPIRATORY (INHALATION) at 07:42

## 2023-01-06 RX ADMIN — IPRATROPIUM BROMIDE AND ALBUTEROL SULFATE 1 AMPULE: 2.5; .5 SOLUTION RESPIRATORY (INHALATION) at 23:33

## 2023-01-06 NOTE — PROGRESS NOTES
[address] [facility]   OCCUPATIONAL THERAPY MISSED TREATMENT NOTE   INPATIENT   Date: 23  Patient Name: Sebastien Munoz       Room: 9171/9063-62  MRN: [id number]   Account #: [de-identified]    : 1965  (61 y.o.)  Gender: male   Referring Practitioner: Dr. Aura Mcclelland  Diagnosis: COPD exacerbation, acute on chronic respiratory failure with hypoxia and hypercapnia, pneumonia due to COVID-19             REASON FOR MISSED TREATMENT:  Patient refusal   -    pt politely declines therapy at this time 2* not feeling well. Will continue to follow for OT needs.         BENEDICTO Negro

## 2023-01-06 NOTE — CARE COORDINATION
DISCHARGE PLANNING NOTE:    Call from Patient's sister robbie and she talked to the patient and he is agreeable to go . [facility]ossi Barr would like [facility] and second choice is [facility]. Referral made to both facilities.      Electronically signed by Belkis Waller RN on 1/6/2023 at 3:04 PM

## 2023-01-06 NOTE — PROGRESS NOTES
[facility]    HISTORY AND PHYSICAL EXAMINATION            Date:   1/6/2023  Patient name:  Kain Kan  Date of admission:  1/3/2023  5:10 PM  MRN:   [id number]  Account:  [de-identified]  YOB: 1965  PCP:    Luann Grewal MD  Room:   2094/2094-01  Code Status:    Full Code    Chief Complaint:     Chief Complaint   Patient presents with    Respiratory Distress       History Obtained From:     patient, electronic medical record    History of Present Illness: The patient is a 61 y.o. Non- / non  male who presents with Respiratory Distress   and he is admitted to the hospital for the management of shortness of breath  Patient past medical history multiple medical problem which include hypertension, advanced COPD, chronic alcoholism, patient, was admitted with worsening shortness of breath, symptoms are going on for last few days before presentation  Patient apparently was not taking his medication  Was [facility]  Patient denying complaints of chest pain  In emergency room COVID was positive, was cleared by pulmonary medicine team for droplet plus precautions  Patient in emergency room, was found to have pH of 7.2, PCO2 was too high to be recorded, remained on BiPAP overnight, repeat PCO2 is 91    1/5   Patient, clinically doing better  Did use BiPAP last night  On intermittent BiPAP  No new complaints      1/7  Patient got transferred out of ICU this morning  Complaining of worsening cough  Not able to get phlegm out  Will give  acapella  DC fluids      Past Medical History:     Past Medical History:   Diagnosis Date    COPD (chronic obstructive pulmonary disease) ([address]     Hypertension         Past Surgical History:     History reviewed. No pertinent surgical history. Medications Prior to Admission:     Prior to Admission medications    Medication Sig Start Date End Date Taking?  Authorizing Provider   SYMBICORT 160-4.5 MCG/ACT AERO INHALE 2 PUFFS BY MOUTH ONCE DAILY 1/5/23   Angel Greco MD   traZODone (DESYREL) 50 MG tablet Take 1 tablet by mouth nightly 1/5/23   MD Panda Pinto Asters 18 MCG inhalation capsule Inhale 1 puff by mouth once daily 1/5/23   Angel Greco MD   escitalopram (LEXAPRO) 10 MG tablet Take 1 tablet by mouth daily  Patient not taking: Reported on 1/3/2023 6/22/22   Apolinar Mercer MD   folic acid (FOLVITE) 1 MG tablet Take 1 tablet by mouth daily 6/22/22   Apolinar Mercer MD   thiamine 100 MG tablet Take 1 tablet by mouth daily 6/22/22   Apolinar Mercer MD   vitamin D (ERGOCALCIFEROL) 1.25 MG (82074 UT) CAPS capsule Take 1 capsule by mouth once a week 6/22/22   Apolinar Mercer MD   albuterol (PROVENTIL) (2.5 MG/3ML) 0.083% nebulizer solution Take 3 mLs by nebulization 4 times daily 6/7/22   Kyra Yuia, DO   amLODIPine (NORVASC) 10 MG tablet Take 1 tablet by mouth daily 3/25/22   Apolinar Mercer MD        Allergies:     Patient has no known allergies. Social History:     Tobacco:    reports that he has been smoking cigarettes. He has a 43.00 pack-year smoking history. He has never used smokeless tobacco.  Alcohol:      reports current alcohol use. Drug Use:  reports that he does not currently use drugs. Family History:     History reviewed. No pertinent family history. Review of Systems:     Positive and Negative as described in HPI. CONSTITUTIONAL:  negative for fevers, chills, sweats, fatigue, weight loss  HEENT:  negative for vision, hearing changes, runny nose, throat pain  RESPIRATORY: Shortness of breath, cough  CARDIOVASCULAR:  negative for chest pain, palpitations.   Patient has pain on right side of lower chest which worse with coughing  GASTROINTESTINAL:  negative for nausea, vomiting, diarrhea, constipation, change in bowel habits, abdominal pain   GENITOURINARY:  negative for difficulty of urination, burning with urination, frequency INTEGUMENT:  negative for rash, skin lesions, easy bruising   HEMATOLOGIC/LYMPHATIC:  negative for swelling/edema   ALLERGIC/IMMUNOLOGIC:  negative for urticaria , itching  ENDOCRINE:  negative increase in drinking, increase in urination, hot or cold intolerance  MUSCULOSKELETAL:  negative joint pains, muscle aches, swelling of joints, clubbing present   NEUROLOGICAL:  negative for headaches, dizziness, lightheadedness, numbness, pain, tingling extremities  BEHAVIOR/PSYCH:  negative for depression, anxiety    Physical Exam:   /80   Pulse 89   Temp 98.1 °F (36.7 °C) (Oral)   Resp 18   Ht 5' 7.72\" (1.72 m)   Wt 143 lb 15.4 oz (65.3 kg)   SpO2 95%   BMI 22.07 kg/m²   Temp (24hrs), Av.8 °F (36.6 °C), Min:97 °F (36.1 °C), Max:98.2 °F (36.8 °C)    No results for input(s): POCGLU in the last 72 hours. Intake/Output Summary (Last 24 hours) at 2023 1103  Last data filed at 2023 6333  Gross per 24 hour   Intake 3971.89 ml   Output 2350 ml   Net 1621.89 ml       General Appearance:  alert, well appearing, and in no acute distress  Mental status: oriented to person, place, and time with normal affect  Head:  normocephalic, atraumatic. Eye: no icterus, redness, pupils equal and reactive, extraocular eye movements intact, conjunctiva clear  Ear: normal external ear, no discharge, hearing intact  Nose:  no drainage noted  Mouth: mucous membranes moist  Neck: supple, no carotid bruits, thyroid not palpable  Lungs: Air entry, bilateral decreased, no rales, rhonchi  Cardiovascular: normal rate, regular rhythm, no murmur, gallop, rub.   Abdomen: Soft, nontender, nondistended, normal bowel sounds, no hepatomegaly or splenomegaly  Neurologic: There are no new focal motor or sensory deficits, normal muscle tone and bulk, no abnormal sensation, normal speech, cranial nerves II through XII grossly intact  Skin: No gross lesions, rashes, bruising or bleeding on exposed skin area  Extremities:  peripheral pulses palpable, no pedal edema or calf pain with palpation  Psych: normal affect     Investigations:      Laboratory Testing:  Recent Results (from the past 24 hour(s))   Basic Metabolic Panel w/ Reflex to MG    Collection Time: 01/06/23  5:09 AM   Result Value Ref Range    Glucose 143 (H) 70 - 99 mg/dL    BUN 11 6 - 20 mg/dL    Creatinine <0.40 (L) 0.70 - 1.20 mg/dL    Est, Glom Filt Rate Can not be calculated >60 mL/min/1.73m2    Calcium 8.4 (L) 8.6 - 10.4 mg/dL    Sodium 136 135 - 144 mmol/L    Potassium 4.5 3.7 - 5.3 mmol/L    Chloride 90 (L) 98 - 107 mmol/L    CO2 42 (HH) 20 - 31 mmol/L    Anion Gap 4 (L) 9 - 17 mmol/L   CBC with Auto Differential    Collection Time: 01/06/23  5:09 AM   Result Value Ref Range    WBC 5.1 3.5 - 11.0 k/uL    RBC 3.29 (L) 4.5 - 5.9 m/uL    Hemoglobin 9.9 (L) 13.5 - 17.5 g/dL    Hematocrit 31.0 (L) 41 - 53 %    MCV 94.1 80 - 100 fL    MCH 30.1 26 - 34 pg    MCHC 32.0 31 - 37 g/dL    RDW 13.0 11.5 - 14.9 %    Platelets 034 122 - 410 k/uL    MPV 6.7 6.0 - 12.0 fL    Seg Neutrophils 85 (H) 36 - 66 %    Lymphocytes 11 (L) 24 - 44 %    Monocytes 3 1 - 7 %    Eosinophils % 0 0 - 4 %    Basophils 0 0 - 2 %    Bands 1 0 - 10 %    Segs Absolute 4.34 1.3 - 9.1 k/uL    Absolute Lymph # 0.56 (L) 1.0 - 4.8 k/uL    Absolute Mono # 0.15 0.1 - 1.3 k/uL    Absolute Eos # 0.00 0.0 - 0.4 k/uL    Basophils Absolute 0.00 0.0 - 0.2 k/uL    Absolute Bands # 0.05 0.0 - 1.0 k/uL    Morphology ANISOCYTOSIS PRESENT     Morphology 1+ TEARDROPS     Morphology 1+ ELLIPTOCYTES     Morphology TOXIC GRANULATION PRESENT        Imaging/Diagnostics:      Assessment :      Primary Problem  COPD exacerbation (HCC)    Active Hospital Problems    Diagnosis Date Noted    COPD exacerbation ([facility] [address] [J44.1] 06/22/2022     Priority: Medium       Plan:     Patient status Admit as inpatient in the  Medical ICU    Advanced COPD,, admitted with worsening shortness of breath,  Acute hypercapnic respiratory failure, on BiPAP, steroids, DuoNeb nebulization  Hypertension, restarted home dose of Norvasc, controlled  On DVT prophylaxis Lovenox  Patient is clubbing, CT chest done in 6/22 , which was negative   Overall prognosis guarded, given advanced COPD, chronic hypoxic respiratory failure  Patient had a left renal mass, which was seen in CT scan in June of this year, need urology input as outpatient  1/5   Patient feeling better  Complaining of cough and dryness of mouth with continuous BiPAP  Started on cough medication  Much improved  Will discuss with pulmonary medicine team, likely transfer out of ICU    1/6  Transferred out of ICU  On Mucinex  Not able to expectorate  Will give Acapella  Continues to be on IV steroids  Patient will likely long-term steroids  Will order PT OT eval.  Consultations:   IP CONSULT TO PULMONOLOGY  IP CONSULT TO PRIMARY CARE PROVIDER  IP CONSULT TO SOCIAL WORK    Patient is admitted as inpatient status because of co-morbidities listed above, severity of signs and symptoms as outlined, requirement for current medical therapies and most importantly because of direct risk to patient if care not provided in a hospital setting. Marilee Archer MD  1/6/2023  11:03 AM    Copy sent to Dr. Luann Grewal MD    Please note that this chart was generated using voice recognition Dragon dictation software. Although every effort was made to ensure the accuracy of this automated transcription, some errors in transcription may have occurred.

## 2023-01-06 NOTE — PROGRESS NOTES
Report given to [facility] from PCU. Patient transferred to room 2094 via wheelchair with all belongings.

## 2023-01-06 NOTE — PROGRESS NOTES
Pulmonary Progress Note  [facility]      Patient - Heather Barefoot,  Age - 61 y.o.    - 1965      Room Number - 2094/2094-01   N -  [id number]   [facility]  Date of Admission -  1/3/2023  5:10 PM        Consulting Sudhakar Paiz MD  Primary Care Physician - Geoff Cisneros MD     SUBJECTIVE   Complains of being very short of breath and used BiPAP overnight but eating well; also states that he cannot bring up his phlegm    OBJECTIVE   VITALS    height is 5' 7.72\" (1.72 m) and weight is 143 lb 15.4 oz (65.3 kg). His oral temperature is 98.1 °F (36.7 °C). His blood pressure is 124/80 and his pulse is 88. His respiration is 20 and oxygen saturation is 94%. Body mass index is 22.07 kg/m². Temperature Range: Temp: 98.1 °F (36.7 °C) Temp  Av.8 °F (36.6 °C)  Min: 97 °F (36.1 °C)  Max: 98.2 °F (36.8 °C)  BP Range:  Systolic (66DWF), SON:822 , Min:123 , MT     Diastolic (77MPW), VJX:53, Min:61, Max:88    Pulse Range: Pulse  Av.2  Min: 73  Max: 93  Respiration Range: Resp  Av.4  Min: 12  Max: 22  Current Pulse Ox[de-identified]  SpO2: 94 %  24HR Pulse Ox Range:  SpO2  Av.5 %  Min: 84 %  Max: 99 %  Oxygen Amount and Delivery: O2 Flow Rate (L/min): (S) 4 L/min    Wt Readings from Last 3 Encounters:   23 143 lb 15.4 oz (65.3 kg)   22 154 lb 1.6 oz (69.9 kg)   22 140 lb 6.4 oz (63.7 kg)       I/O (24 Hours)    Intake/Output Summary (Last 24 hours) at 2023 1220  Last data filed at 2023 0621  Gross per 24 hour   Intake 3621.89 ml   Output 2350 ml   Net 1271.89 ml       EXAM     General Appearance  Awake, alert, oriented, in no acute distress  HEENT - normocephalic, atraumatic.  []  Mallampati  [] Crowded airway   [] Macroglossia  []  Retrognathia  [] Micrognathia  []  Normal tongue size []  Normal Bite  [] Chikis sign positive    Neck - Supple,  trachea midline   Lungs -scattered wheezes  Cardiovascular - Heart sounds are normal.  Regular rate and rhythm   Abdomen - Soft, nontender, nondistended, no masses or organomegaly  Neurologic - There are no focal motor or sensory deficits  Skin - No bruising or bleeding  Extremities - No clubbing, cyanosis, edema    MEDS      methylPREDNISolone  40 mg IntraVENous Q8H    ipratropium-albuterol  1 ampule Inhalation Q4H    enoxaparin  40 mg SubCUTAneous Daily    cefepime  2,000 mg IntraVENous Q8H    amLODIPine  10 mg Oral Daily    folic acid  1 mg Oral Daily    budesonide-formoterol  2 puff Inhalation Daily    thiamine mononitrate  100 mg Oral Daily    sodium chloride flush  5-40 mL IntraVENous 2 times per day      sodium chloride       guaiFENesin, ipratropium-albuterol, sodium chloride flush, sodium chloride, ondansetron **OR** ondansetron, polyethylene glycol, acetaminophen **OR** acetaminophen    LABS   CBC   Recent Labs     01/06/23  0509   WBC 5.1   HGB 9.9*   HCT 31.0*   MCV 94.1        BMP:   Lab Results   Component Value Date/Time     01/06/2023 05:09 AM    K 4.5 01/06/2023 05:09 AM    CL 90 01/06/2023 05:09 AM    CO2 42 01/06/2023 05:09 AM    BUN 11 01/06/2023 05:09 AM    LABALBU 4.0 06/22/2022 03:43 PM    CREATININE <0.40 01/06/2023 05:09 AM    CALCIUM 8.4 01/06/2023 05:09 AM    GFRAA >60 06/24/2022 04:47 AM    LABGLOM Can not be calculated 01/06/2023 05:09 AM     ABGs:  Lab Results   Component Value Date/Time    PHART 7.317 01/03/2023 06:34 PM    PO2ART 67.8 01/03/2023 06:34 PM    XRR4YIR 92.8 01/03/2023 06:34 PM      Lab Results   Component Value Date/Time    MODE IPAP18/EPAP8 01/03/2023 06:34 PM     Ionized Calcium:  No results found for: IONCA  Magnesium:    Lab Results   Component Value Date/Time    MG 2.0 06/07/2022 07:30 PM     Phosphorus:  No results found for: PHOS     LIVER PROFILE No results for input(s): AST, ALT, LIPASE, BILIDIR, BILITOT, ALKPHOS in the last 72 hours. Invalid input(s):   AMYLASE,  ALB  INR No results for input(s): INR in the last 72 hours.   PTT No results found for: APTT      RADIOLOGY     (See actual reports for details)    ASSESSMENT/PLAN       Acute on chronic hypoxic and hypercapnic respiratory failure  Severe's COPD clinically with acute exacerbation  History of tobacco use  History of recent COVID infection early December at [address]  Full code      Agree with flutter valve therapy  Continue DuoNebs every 4 hours and every 4 hours as needed  Add Mucinex 1200 mg twice a day  No change in steroids  BiPAP at night and during the day as needed  PT/OT may need additional evaluation at [facility]      Electronically signed by Juanito Robles MD on 1/6/2023 at 12:20 PM

## 2023-01-06 NOTE — PLAN OF CARE
Problem: Discharge Planning  Goal: Discharge to home or other facility with appropriate resources  1/6/2023 0251 by Regine Song RN  Outcome: Progressing     Problem: Safety - Adult  Goal: Free from fall injury  1/6/2023 0251 by Regine Song RN  Outcome: Progressing  Flowsheets (Taken 1/5/2023 2000)  Free From Fall Injury: Instruct family/caregiver on patient safety   Pt educated on the use of the call light,. Problem: Skin/Tissue Integrity  Goal: Absence of new skin breakdown  Description: 1. Monitor for areas of redness and/or skin breakdown  2. Assess vascular access sites hourly  3. Every 4-6 hours minimum:  Change oxygen saturation probe site  4. Every 4-6 hours:  If on nasal continuous positive airway pressure, respiratory therapy assess nares and determine need for appliance change or resting period.   1/6/2023 0251 by Regine Song RN  Outcome: Progressing

## 2023-01-06 NOTE — CARE COORDINATION
ONGOING DISCHARGE PLAN:    Patient is alert and oriented x4. Spoke with patient regarding discharge plan and patient confirms that plan is still wants to go to a Facility , but wants his sister Az Garcia to make the decision. Writer called Sister Az Garcia and She gave 2 choices, first choice is [facility], 2nd choice is [facility]    Will send referral.    Was a transfer out of ICU today. Patient states he still is not feeling good. Remains on IV solu medrol, IV Zithromax, IV Cefepime, IV solu medrol    Will continue to follow for additional discharge needs.     Electronically signed by Vee Ford RN on 1/6/2023 at 3:06 PM

## 2023-01-06 NOTE — PROGRESS NOTES
BRONCHOSPASM/BRONCHOCONSTRICTION     [x]         IMPROVE AERATION/BREATH SOUNDS  [x]   ADMINISTER BRONCHODILATOR THERAPY AS APPROPRIATE  [x]   ASSESS BREATH SOUNDS  []   IMPLEMENT AEROSOL/MDI PROTOCOL  [x]   PATIENT EDUCATION AS NEEDED   NONINVASIVE VENTILATION    PROVIDE OPTIMAL VENTILATION/ACCEPTABLE SPO2   IMPLEMENT NONINVASIVE VENTILATION PROTOCOL   MAINTAIN ACCEPTABLE SPO2   ASSESS SKIN INTEGRITY/BREAKDOWN SCORE   PATIENT EDUCATION AS NEEDED   BIPAP AS NEEDED    Pt currently on 3lnc , SpO2 97% pt wore bipap overnight without issues diminished breath sounds throughout

## 2023-01-07 LAB
ABSOLUTE BANDS #: 0.15 K/UL (ref 0–1)
ABSOLUTE EOS #: 0 K/UL (ref 0–0.4)
ABSOLUTE LYMPH #: 0.56 K/UL (ref 1–4.8)
ABSOLUTE MONO #: 0.46 K/UL (ref 0.1–1.3)
ANION GAP SERPL CALCULATED.3IONS-SCNC: ABNORMAL MMOL/L (ref 9–17)
BANDS: 3 % (ref 0–10)
BASOPHILS # BLD: 0 % (ref 0–2)
BASOPHILS ABSOLUTE: 0 K/UL (ref 0–0.2)
BUN BLDV-MCNC: 11 MG/DL (ref 6–20)
CALCIUM SERPL-MCNC: 8.8 MG/DL (ref 8.6–10.4)
CHLORIDE BLD-SCNC: 90 MMOL/L (ref 98–107)
CO2: >45 MMOL/L (ref 20–31)
CREAT SERPL-MCNC: <0.4 MG/DL (ref 0.7–1.2)
EOSINOPHILS RELATIVE PERCENT: 0 % (ref 0–4)
GFR SERPL CREATININE-BSD FRML MDRD: ABNORMAL ML/MIN/1.73M2
GLUCOSE BLD-MCNC: 118 MG/DL (ref 70–99)
HCT VFR BLD CALC: 32.9 % (ref 41–53)
HEMOGLOBIN: 10.8 G/DL (ref 13.5–17.5)
LYMPHOCYTES # BLD: 11 % (ref 24–44)
MCH RBC QN AUTO: 30.8 PG (ref 26–34)
MCHC RBC AUTO-ENTMCNC: 32.8 G/DL (ref 31–37)
MCV RBC AUTO: 94 FL (ref 80–100)
MONOCYTES # BLD: 9 % (ref 1–7)
MORPHOLOGY: ABNORMAL
PDW BLD-RTO: 12.7 % (ref 11.5–14.9)
PLATELET # BLD: 230 K/UL (ref 150–450)
PMV BLD AUTO: 6.5 FL (ref 6–12)
POTASSIUM SERPL-SCNC: 4.4 MMOL/L (ref 3.7–5.3)
RBC # BLD: 3.5 M/UL (ref 4.5–5.9)
SEG NEUTROPHILS: 77 % (ref 36–66)
SEGMENTED NEUTROPHILS ABSOLUTE COUNT: 3.93 K/UL (ref 1.3–9.1)
SODIUM BLD-SCNC: 138 MMOL/L (ref 135–144)
WBC # BLD: 5.1 K/UL (ref 3.5–11)

## 2023-01-07 PROCEDURE — 80048 BASIC METABOLIC PNL TOTAL CA: CPT

## 2023-01-07 PROCEDURE — 36415 COLL VENOUS BLD VENIPUNCTURE: CPT

## 2023-01-07 PROCEDURE — 2580000003 HC RX 258: Performed by: INTERNAL MEDICINE

## 2023-01-07 PROCEDURE — 99233 SBSQ HOSP IP/OBS HIGH 50: CPT | Performed by: INTERNAL MEDICINE

## 2023-01-07 PROCEDURE — 6360000002 HC RX W HCPCS: Performed by: INTERNAL MEDICINE

## 2023-01-07 PROCEDURE — 6370000000 HC RX 637 (ALT 250 FOR IP): Performed by: INTERNAL MEDICINE

## 2023-01-07 PROCEDURE — 94761 N-INVAS EAR/PLS OXIMETRY MLT: CPT

## 2023-01-07 PROCEDURE — 2060000000 HC ICU INTERMEDIATE R&B

## 2023-01-07 PROCEDURE — 94640 AIRWAY INHALATION TREATMENT: CPT

## 2023-01-07 PROCEDURE — 6370000000 HC RX 637 (ALT 250 FOR IP): Performed by: NURSE PRACTITIONER

## 2023-01-07 PROCEDURE — 94660 CPAP INITIATION&MGMT: CPT

## 2023-01-07 PROCEDURE — 85025 COMPLETE CBC W/AUTO DIFF WBC: CPT

## 2023-01-07 RX ADMIN — THIAMINE HCL TAB 100 MG 100 MG: 100 TAB at 10:38

## 2023-01-07 RX ADMIN — ENOXAPARIN SODIUM 40 MG: 100 INJECTION SUBCUTANEOUS at 10:38

## 2023-01-07 RX ADMIN — CEFEPIME 2000 MG: 2 INJECTION, POWDER, FOR SOLUTION INTRAVENOUS at 03:22

## 2023-01-07 RX ADMIN — GUAIFENESIN 1200 MG: 600 TABLET, EXTENDED RELEASE ORAL at 20:04

## 2023-01-07 RX ADMIN — IPRATROPIUM BROMIDE AND ALBUTEROL SULFATE 1 AMPULE: 2.5; .5 SOLUTION RESPIRATORY (INHALATION) at 03:25

## 2023-01-07 RX ADMIN — IPRATROPIUM BROMIDE AND ALBUTEROL SULFATE 1 AMPULE: 2.5; .5 SOLUTION RESPIRATORY (INHALATION) at 15:42

## 2023-01-07 RX ADMIN — Medication 60 MG: at 20:04

## 2023-01-07 RX ADMIN — Medication 60 MG: at 10:38

## 2023-01-07 RX ADMIN — CEFEPIME 2000 MG: 2 INJECTION, POWDER, FOR SOLUTION INTRAVENOUS at 20:06

## 2023-01-07 RX ADMIN — AMLODIPINE BESYLATE 10 MG: 10 TABLET ORAL at 10:39

## 2023-01-07 RX ADMIN — METHYLPREDNISOLONE SODIUM SUCCINATE 40 MG: 40 INJECTION, POWDER, FOR SOLUTION INTRAMUSCULAR; INTRAVENOUS at 20:04

## 2023-01-07 RX ADMIN — IPRATROPIUM BROMIDE AND ALBUTEROL SULFATE 1 AMPULE: 2.5; .5 SOLUTION RESPIRATORY (INHALATION) at 11:33

## 2023-01-07 RX ADMIN — CEFEPIME 2000 MG: 2 INJECTION, POWDER, FOR SOLUTION INTRAVENOUS at 12:59

## 2023-01-07 RX ADMIN — FOLIC ACID 1 MG: 1 TABLET ORAL at 10:38

## 2023-01-07 RX ADMIN — METHYLPREDNISOLONE SODIUM SUCCINATE 40 MG: 40 INJECTION, POWDER, FOR SOLUTION INTRAMUSCULAR; INTRAVENOUS at 12:57

## 2023-01-07 RX ADMIN — METHYLPREDNISOLONE SODIUM SUCCINATE 40 MG: 40 INJECTION, POWDER, FOR SOLUTION INTRAMUSCULAR; INTRAVENOUS at 03:21

## 2023-01-07 RX ADMIN — IPRATROPIUM BROMIDE AND ALBUTEROL SULFATE 1 AMPULE: 2.5; .5 SOLUTION RESPIRATORY (INHALATION) at 23:41

## 2023-01-07 RX ADMIN — GUAIFENESIN 1200 MG: 600 TABLET, EXTENDED RELEASE ORAL at 10:38

## 2023-01-07 NOTE — PROGRESS NOTES
DARRION Berkowitz 52    HISTORY AND PHYSICAL EXAMINATION            Date:   1/7/2023  Patient name:  Svetlana Malik  Date of admission:  1/3/2023  5:10 PM  MRN:   [id number]  Account:  [de-identified]  YOB: 1965  PCP:    Kelton Aly MD  Room:   2094/2094-01  Code Status:    Full Code    Chief Complaint:     Chief Complaint   Patient presents with    Respiratory Distress       History Obtained From:     patient, electronic medical record    History of Present Illness: The patient is a 61 y.o. Non- / non  male who presents with Respiratory Distress   and he is admitted to the hospital for the management of shortness of breath  Patient past medical history multiple medical problem which include hypertension, advanced COPD, chronic alcoholism, patient, was admitted with worsening shortness of breath, symptoms are going on for last few days before presentation  Patient apparently was not taking his medication  Was admitted at [facility]  Patient denying complaints of chest pain  In emergency room COVID was positive, was cleared by pulmonary medicine team for droplet plus precautions  Patient in emergency room, was found to have pH of 7.2, PCO2 was too high to be recorded, remained on BiPAP overnight, repeat PCO2 is 91    1/5   Patient, clinically doing better  Did use BiPAP last night  On intermittent BiPAP  No new complaints      1/7  Patient got transferred out of ICU this morning  Complaining of worsening cough  Not able to get phlegm out  Will give  acapella  DC fluids      1/8  Had episode of worsening shortness of breath yesterday  Was placed on BiPAP  Also had 5 beat run of V. tach  Electrolytes were normal  Patient denied any chest pain or palpitations  Currently on BiPAP  States that he is feeling slightly better than yesterday  His vital signs been stable  Patient has been afebrile.     Past Medical History: Past Medical History:   Diagnosis Date    COPD (chronic obstructive pulmonary disease) ([facility] [address]     Hypertension         Past Surgical History:     History reviewed. No pertinent surgical history. Medications Prior to Admission:     Prior to Admission medications    Medication Sig Start Date End Date Taking? Authorizing Provider   SYMBICORT 160-4.5 MCG/ACT AERO INHALE 2 PUFFS BY MOUTH ONCE DAILY 1/5/23   Quin Morgan MD   traZODone (DESYREL) 50 MG tablet Take 1 tablet by mouth nightly 1/5/23   MD Bryn Hernándezford [address] 18 MCG inhalation capsule Inhale 1 puff by mouth once daily 1/5/23   Quin Morgan MD   escitalopram (LEXAPRO) 10 MG tablet Take 1 tablet by mouth daily  Patient not taking: Reported on 1/3/2023 6/22/22   Ely Mcqueen MD   folic acid (FOLVITE) 1 MG tablet Take 1 tablet by mouth daily 6/22/22   Ely Mcqueen MD   thiamine 100 MG tablet Take 1 tablet by mouth daily 6/22/22   Ely Mcqueen MD   vitamin D (ERGOCALCIFEROL) 1.25 MG (50411 UT) CAPS capsule Take 1 capsule by mouth once a week 6/22/22   Ely Mcqueen MD   albuterol (PROVENTIL) (2.5 MG/3ML) 0.083% nebulizer solution Take 3 mLs by nebulization 4 times daily 6/7/22   Win Olivares DO   amLODIPine (NORVASC) 10 MG tablet Take 1 tablet by mouth daily 3/25/22   Ely Mcqueen MD        Allergies:     Patient has no known allergies. Social History:     Tobacco:    reports that he has been smoking cigarettes. He has a 43.00 pack-year smoking history. He has never used smokeless tobacco.  Alcohol:      reports current alcohol use. Drug Use:  reports that he does not currently use drugs. Family History:     History reviewed. No pertinent family history. Review of Systems:     Positive and Negative as described in HPI.     CONSTITUTIONAL:  negative for fevers, chills, sweats, fatigue, weight loss  HEENT:  negative for vision, hearing changes, runny nose, throat pain  RESPIRATORY: Shortness of breath, cough  CARDIOVASCULAR:  negative for chest pain, palpitations.  Patient has pain on right side of lower chest which worse with coughing  GASTROINTESTINAL:  negative for nausea, vomiting, diarrhea, constipation, change in bowel habits, abdominal pain   GENITOURINARY:  negative for difficulty of urination, burning with urination, frequency   INTEGUMENT:  negative for rash, skin lesions, easy bruising   HEMATOLOGIC/LYMPHATIC:  negative for swelling/edema   ALLERGIC/IMMUNOLOGIC:  negative for urticaria , itching  ENDOCRINE:  negative increase in drinking, increase in urination, hot or cold intolerance  MUSCULOSKELETAL:  negative joint pains, muscle aches, swelling of joints, clubbing present   NEUROLOGICAL:  negative for headaches, dizziness, lightheadedness, numbness, pain, tingling extremities  BEHAVIOR/PSYCH:  negative for depression, anxiety    Physical Exam:   /80   Pulse 74   Temp 97.7 °F (36.5 °C) (Axillary)   Resp 20   Ht 5' 7.72\" (1.72 m)   Wt 143 lb 15.4 oz (65.3 kg)   SpO2 90%   BMI 22.07 kg/m²   Temp (24hrs), Av.1 °F (36.7 °C), Min:97.7 °F (36.5 °C), Max:98.6 °F (37 °C)    No results for input(s): POCGLU in the last 72 hours.    Intake/Output Summary (Last 24 hours) at 2023 1107  Last data filed at 2023 1044  Gross per 24 hour   Intake --   Output 1875 ml   Net -1875 ml       General Appearance:  alert, well appearing, and in no acute distress  Mental status: oriented to person, place, and time with normal affect  Head:  normocephalic, atraumatic.  Eye: no icterus, redness, pupils equal and reactive, extraocular eye movements intact, conjunctiva clear  Ear: normal external ear, no discharge, hearing intact  Nose:  no drainage noted  Mouth: mucous membranes moist  Neck: supple, no carotid bruits, thyroid not palpable  Lungs: Air entry, bilateral decreased, no rales, rhonchi  Cardiovascular: normal rate, regular rhythm, no murmur, gallop, rub.  Abdomen: Soft, nontender, nondistended,  normal bowel sounds, no hepatomegaly or splenomegaly  Neurologic: There are no new focal motor or sensory deficits, normal muscle tone and bulk, no abnormal sensation, normal speech, cranial nerves II through XII grossly intact  Skin: No gross lesions, rashes, bruising or bleeding on exposed skin area  Extremities:  peripheral pulses palpable, no pedal edema or calf pain with palpation  Psych: normal affect     Investigations:      Laboratory Testing:  Recent Results (from the past 24 hour(s))   EKG 12 Lead    Collection Time: 01/06/23  4:52 PM   Result Value Ref Range    Ventricular Rate 84 BPM    Atrial Rate 84 BPM    P-R Interval 134 ms    QRS Duration 82 ms    Q-T Interval 334 ms    QTc Calculation (Bazett) 394 ms    P Axis 81 degrees    R Axis 72 degrees    T Axis 80 degrees   Troponin    Collection Time: 01/06/23  5:15 PM   Result Value Ref Range    Troponin, High Sensitivity 6 0 - 22 ng/L   Magnesium    Collection Time: 01/06/23  5:15 PM   Result Value Ref Range    Magnesium 2.1 1.6 - 2.6 mg/dL   Brain Natriuretic Peptide    Collection Time: 01/06/23  5:15 PM   Result Value Ref Range    Pro- (H) <300 pg/mL   Basic Metabolic Panel    Collection Time: 01/06/23  7:16 PM   Result Value Ref Range    Glucose 198 (H) 70 - 99 mg/dL    BUN 14 6 - 20 mg/dL    Creatinine 0.64 (L) 0.70 - 1.20 mg/dL    Est, Glom Filt Rate >60 >60 mL/min/1.73m2    Calcium 8.6 8.6 - 10.4 mg/dL    Sodium 135 135 - 144 mmol/L    Potassium 4.6 3.7 - 5.3 mmol/L    Chloride 86 (L) 98 - 107 mmol/L    CO2 >45 (HH) 20 - 31 mmol/L    Anion Gap Unable to calculate anion gap due to CO2 >45. 9 - 17 mmol/L   CBC with Auto Differential    Collection Time: 01/07/23  5:41 AM   Result Value Ref Range    WBC 5.1 3.5 - 11.0 k/uL    RBC 3.50 (L) 4.5 - 5.9 m/uL    Hemoglobin 10.8 (L) 13.5 - 17.5 g/dL    Hematocrit 32.9 (L) 41 - 53 %    MCV 94.0 80 - 100 fL    MCH 30.8 26 - 34 pg    MCHC 32.8 31 - 37 g/dL    RDW 12.7 11.5 - 14.9 %    Platelets 994 907 - 450 k/uL    MPV 6.5 6.0 - 12.0 fL    Seg Neutrophils 77 (H) 36 - 66 %    Lymphocytes 11 (L) 24 - 44 %    Monocytes 9 (H) 1 - 7 %    Eosinophils % 0 0 - 4 %    Basophils 0 0 - 2 %    Bands 3 0 - 10 %    Segs Absolute 3.93 1.3 - 9.1 k/uL    Absolute Lymph # 0.56 (L) 1.0 - 4.8 k/uL    Absolute Mono # 0.46 0.1 - 1.3 k/uL    Absolute Eos # 0.00 0.0 - 0.4 k/uL    Basophils Absolute 0.00 0.0 - 0.2 k/uL    Absolute Bands # 0.15 0.0 - 1.0 k/uL    Morphology ANISOCYTOSIS PRESENT     Morphology 1+ ELLIPTOCYTES     Morphology 1+ TEARDROPS    Basic Metabolic Panel    Collection Time: 01/07/23  5:41 AM   Result Value Ref Range    Glucose 118 (H) 70 - 99 mg/dL    BUN 11 6 - 20 mg/dL    Creatinine <0.40 (L) 0.70 - 1.20 mg/dL    Est, Glom Filt Rate Can not be calculated >60 mL/min/1.73m2    Calcium 8.8 8.6 - 10.4 mg/dL    Sodium 138 135 - 144 mmol/L    Potassium 4.4 3.7 - 5.3 mmol/L    Chloride 90 (L) 98 - 107 mmol/L    CO2 >45 (HH) 20 - 31 mmol/L    Anion Gap Unable to calculate anion gap due to CO2 >45. 9 - 17 mmol/L       Imaging/Diagnostics:      Assessment :      Primary Problem  COPD exacerbation (HCC)    Active Hospital Problems    Diagnosis Date Noted    [facility] [J44.1] 06/22/2022     Priority: Medium       Plan:     Patient status Admit as inpatient in the  Medical ICU    Advanced COPD,, admitted with worsening shortness of breath,  Acute hypercapnic respiratory failure, on BiPAP, steroids, DuoNeb nebulization  Hypertension, restarted home dose of Norvasc, controlled  On DVT prophylaxis Lovenox  Patient is clubbing, CT chest done in 6/22 , which was negative   Overall prognosis guarded, given advanced COPD, chronic hypoxic respiratory failure  Patient had a left renal mass, which was seen in CT scan in June of this year, need urology input as outpatient  1/5   Patient feeling better  Complaining of cough and dryness of mouth with continuous BiPAP  Started on cough medication  Much improved  Will discuss with pulmonary medicine team, likely transfer out of ICU    1/6  Transferred out of ICU  On Mucinex  Not able to expectorate  Will give Acapella  Continues to be on IV steroids  Patient will likely long-term steroids  Will order PT OT eval.    1/7  Patient had episode of NSVT likely precipitated by underlying hypoxia  Electrolytes were checked which were normal  Troponin was normal  Will check echocardiogram  Chest x-ray did yesterday was nonacute  Patient currently on BiPAP  Dose of Mucinex was increased yesterday  Advised to do Acapella to get secretions out  On antibiotics will complete total of 7 days.  On IV steroids will likely need long-term steroids on discharge.  CODE STATUS discussed again today patient wants to remain full code  Patient understands that he is critically sick  Will also need urology follow-up for left renal mass    Consultations:   IP CONSULT TO PULMONOLOGY  IP CONSULT TO PRIMARY CARE PROVIDER  IP CONSULT TO SOCIAL WORK    Patient is admitted as inpatient status because of co-morbidities listed above, severity of signs and symptoms as outlined, requirement for current medical therapies and most importantly because of direct risk to patient if care not provided in a hospital setting.    Daniella Sierra MD  1/7/2023  11:07 AM    Copy sent to Dr. Erika Quiroz MD    Please note that this chart was generated using voice recognition Dragon dictation software.  Although every effort was made to ensure the accuracy of this automated transcription, some errors in transcription may have occurred.

## 2023-01-07 NOTE — PLAN OF CARE
Problem: Discharge Planning  Goal: Discharge to home or other facility with appropriate resources  1/7/2023 1641 by Keenan Barahona RN  Outcome: Progressing  Flowsheets (Taken 1/7/2023 1641)  Discharge to home or other facility with appropriate resources:   Identify barriers to discharge with patient and caregiver   Arrange for needed discharge resources and transportation as appropriate   Identify discharge learning needs (meds, wound care, etc)  1/7/2023 0431 by Ernestine Rivero RN  Outcome: Progressing  Flowsheets (Taken 1/7/2023 0431)  Discharge to home or other facility with appropriate resources:   Identify barriers to discharge with patient and caregiver   Refer to discharge planning if patient needs post-hospital services based on physician order or complex needs related to functional status, cognitive ability or social support system   Arrange for needed discharge resources and transportation as appropriate  Note: Patient transferred out of ICU to PCU 1/6; Referrals sent to [facility] and [facility] per Case Management. Patient agreeable to SNF once medically cleared. Problem: Safety - Adult  Goal: Free from fall injury  1/7/2023 1641 by Keenan Barahona RN  Outcome: Progressing  Flowsheets (Taken 1/7/2023 1641)  Free From Fall Injury: Instruct family/caregiver on patient safety  Note: The patient remained free from falls this shift, call light within reach, bed in locked and lowest position. Side rails up x2. Continue to monitor closely. 1/7/2023 0431 by Ernestine Rivero RN  Outcome: Progressing  Flowsheets (Taken 1/7/2023 0431)  Free From Fall Injury:   Instruct family/caregiver on patient safety   Based on caregiver fall risk screen, instruct family/caregiver to ask for assistance with transferring infant if caregiver noted to have fall risk factors  Note: Patient remains free from falls during this shift.  Bed in lowest position, side rails up x2, call light/personal belongings/side table within reach. Patient calls out appropriately with call light for assistance with ambulation. Problem: Skin/Tissue Integrity  Goal: Absence of new skin breakdown  Description: 1. Monitor for areas of redness and/or skin breakdown  2. Assess vascular access sites hourly  3. Every 4-6 hours minimum:  Change oxygen saturation probe site  4. Every 4-6 hours:  If on nasal continuous positive airway pressure, respiratory therapy assess nares and determine need for appliance change or resting period. 1/7/2023 1641 by Renate Og RN  Outcome: Progressing  Note: Skin assessment complete. Pt turns self. Area kept free from moisture. Proper nourishment and fluids encouraged, as appropriate. Skin remains clean, dry, and intact. See flowsheets. Will continue to monitor for additional needs and changes in skin breakdown.    1/7/2023 0431 by Theodora Gonzalez RN  Outcome: Progressing     Problem: ABCDS Injury Assessment  Goal: Absence of physical injury  1/7/2023 1641 by Renate Og RN  Outcome: Progressing  Flowsheets (Taken 1/7/2023 1641)  Absence of Physical Injury: Implement safety measures based on patient assessment  1/7/2023 0431 by Theodora Gonzalez RN  Outcome: Progressing  Flowsheets (Taken 1/7/2023 0431)  Absence of Physical Injury: Implement safety measures based on patient assessment

## 2023-01-07 NOTE — PROGRESS NOTES
Primitivo Hurd MD/Mukul Villalta MD/ Dr. Magali Montgomery Dr. Spurgeon Harada Dr. Darby Ohms AGACNP-BC, NP-C      Arnold Pitts APRN NP-C          Bindu Klein APRN - NP-C                                      Critical Care / Pulmonary Progress Note    Patient - Shea Case   Age - 61 y.o.   - 1965  MRN - [id number]  Acct # - [de-identified]  Date of Admission - 1/3/2023  5:10 PM    Consulting Service/Physician:       Primary Care Physician: Ely Mcqueen MD    SUBJECTIVE:     Chief Complaint:   Chief Complaint   Patient presents with    Respiratory Distress     Subjective:    Patient's vital signs are stable. He has been using BiPAP. He tells me he still has some shortness of breath. He tells me his baseline of oxygen is 4 L. He is unsure who his pulmonologist is outpatient. VITALS  /80   Pulse 74   Temp 97.7 °F (36.5 °C) (Axillary)   Resp 20   Ht 5' 7.72\" (1.72 m)   Wt 143 lb 15.4 oz (65.3 kg)   SpO2 90%   BMI 22.07 kg/m²   Wt Readings from Last 3 Encounters:   23 143 lb 15.4 oz (65.3 kg)   22 154 lb 1.6 oz (69.9 kg)   22 140 lb 6.4 oz (63.7 kg)     I/O (24 Hours)    Intake/Output Summary (Last 24 hours) at 2023 1118  Last data filed at 2023 1044  Gross per 24 hour   Intake --   Output 1875 ml   Net -1875 ml     Ventilator:   Settings  FiO2 : 40 %  Insp Rise Time (%): 4 %  Exam:   Physical Exam   Constitutional:  Oriented to person, place, and time. Appears well-developed and well-nourished. HENT: Unremarkable  Head: Normocephalic and atraumatic. Eyes: EOM are normal. Pupils are equal, round, and reactive to light. Neck: Neck supple. Cardiovascular:  Regular rate and rhythm. Normal heart tones. No JVD. Pulmonary/Chest: Effort normal and breath sounds are fairly diminished posteriorly  Abdominal: Soft. Bowel sounds are normal. There is no tenderness.    Neurological:patient is alert and oriented to person, place, and time. Skin: Skin is warm and dry. No rash noted.    Extremities: No edema or discoloration  Infusions:      sodium chloride       Meds:     Current Facility-Administered Medications:     guaiFENesin (MUCINEX) extended release tablet 1,200 mg, 1,200 mg, Oral, BID, Khalida Mckeon MD, 1,200 mg at 01/07/23 1038    dextromethorphan (DELSYM) 30 MG/5ML extended release liquid 60 mg, 60 mg, Oral, 2 times per day, Khalida Mckeon MD, 60 mg at 01/07/23 1038    methylPREDNISolone sodium (SOLU-MEDROL) injection 40 mg, 40 mg, IntraVENous, Q8H, Khalida Mckeon MD, 40 mg at 01/07/23 0321    ipratropium-albuterol (DUONEB) nebulizer solution 1 ampule, 1 ampule, Inhalation, Q4H, Khalida Mckeon MD, 1 ampule at 01/07/23 0325    ipratropium-albuterol (DUONEB) nebulizer solution 1 ampule, 1 ampule, Inhalation, Q4H PRN, Khalida Mckeon MD    enoxaparin (LOVENOX) injection 40 mg, 40 mg, SubCUTAneous, Daily, Rox Hong MD, 40 mg at 01/07/23 1038    [COMPLETED] cefepime (MAXIPIME) 2,000 mg in sodium chloride 0.9 % 50 mL IVPB mini-bag, 2,000 mg, IntraVENous, Once, Stopped at 01/03/23 2003 **FOLLOWED BY** cefepime (MAXIPIME) 2,000 mg in sodium chloride 0.9 % 50 mL IVPB mini-bag, 2,000 mg, IntraVENous, Q8H, Rox Hong MD, Stopped at 01/07/23 0754    amLODIPine (NORVASC) tablet 10 mg, 10 mg, Oral, Daily, Samy Aleman APRN - NP, 10 mg at 90/80/98 1765    folic acid (FOLVITE) tablet 1 mg, 1 mg, Oral, Daily, Samy Aleman APRN - NP, 1 mg at 01/07/23 1038    budesonide-formoterol (SYMBICORT) 160-4.5 MCG/ACT inhaler 2 puff, 2 puff, Inhalation, Daily, ERICA Herring - NP, 2 puff at 01/06/23 0196    thiamine mononitrate tablet 100 mg, 100 mg, Oral, Daily, ERICA Herring - DANYELLE, 100 mg at 01/07/23 1038    sodium chloride flush 0.9 % injection 5-40 mL, 5-40 mL, IntraVENous, 2 times per day, ERICA Herring NP    sodium chloride flush 0.9 % injection 5-40 mL, 5-40 mL, IntraVENous, PRN, Rosan Drain, APRN - NP    0.9 % sodium chloride infusion, , IntraVENous, PRN, Rosan Drain, APRN - NP    ondansetron (ZOFRAN-ODT) disintegrating tablet 4 mg, 4 mg, Oral, Q8H PRN **OR** ondansetron (ZOFRAN) injection 4 mg, 4 mg, IntraVENous, Q6H PRN, Rosan Drain, APRN - NP    polyethylene glycol (GLYCOLAX) packet 17 g, 17 g, Oral, Daily PRN, Rosan Drain, APRN - NP    acetaminophen (TYLENOL) tablet 650 mg, 650 mg, Oral, Q6H PRN **OR** acetaminophen (TYLENOL) suppository 650 mg, 650 mg, Rectal, Q6H PRN, Rosan Drain, APRN - NP    Lab Results:     [unfilled]  Lab Results   Component Value Date    WBC 5.1 01/07/2023    HGB 10.8 (L) 01/07/2023    HCT 32.9 (L) 01/07/2023    MCV 94.0 01/07/2023     01/07/2023     Lab Results   Component Value Date    CALCIUM 8.8 01/07/2023     01/07/2023    K 4.4 01/07/2023    CO2 >45 (HH) 01/07/2023    CL 90 (L) 01/07/2023    BUN 11 01/07/2023    CREATININE <0.40 (L) 01/07/2023     No components found for: ABGSAMPLETYP, ABGBODYTEMP, ABGPHCORRFOR, [id number], ABGPHCORRFOOR, ABGPH, ABGPCO2, ABGPO2, ABGBASEEXCES, ABGBASEDEFIC, ABGHCO3, ZPOZ4RNS, ABGENDTIDALC, ABGALLENSTES, ABGSPO2, ABGSAMEPLESIT, [id number], ABGOXYGENSOU  No results found for: INR, PROTIME    Radiology:      Chest x-ray yesterday showed mild emphysema without any acute infiltrate  ASSESSMENT:       Acute on chronic hypoxemic respiratory failure  Acute on chronic hypercapnic respiratory failure  COPD exacerbation  History of severe COPD; maintained on Spiriva, Symbicort Proventil  Recent COVID-19 infection in December at [facility]  Tobacco abuse  Hypertension    PLAN:   Continue with BiPAP nocturnally as well as as needed  Patient may benefit from noninvasive ventilator trilogy. Plans for skilled nursing facility.   Keep current steroids hope to wean tomorrow  I spoke to respiratory therapy at the bedside      Electronically signed by ERICA Gomez CNP on 01/07/23     This progress note was completed using a voice transcription system. Every effort was made to ensure accuracy. However, inadvertent computerized transcription errors may be present.    Suzanna MALDONADO [facility] Pulmonary, Critical [facility] & Sleep

## 2023-01-07 NOTE — PLAN OF CARE
Problem: Discharge Planning  Goal: Discharge to home or other facility with appropriate resources  1/7/2023 0431 by Eryn Cline RN  Outcome: Progressing  Flowsheets (Taken 1/7/2023 0431)  Discharge to home or other facility with appropriate resources:   Identify barriers to discharge with patient and caregiver   Refer to discharge planning if patient needs post-hospital services based on physician order or complex needs related to functional status, cognitive ability or social support system   Arrange for needed discharge resources and transportation as appropriate  Note: Patient transferred out of ICU to PCU 1/6; Referrals sent to [facility] and [facility] per Case Management. Patient agreeable to SNF once medically cleared. Problem: Safety - Adult  Goal: Free from fall injury  1/7/2023 0431 by Eryn Cline RN  Outcome: Progressing  Flowsheets (Taken 1/7/2023 0431)  Free From Fall Injury:   Lawgino Lied family/caregiver on patient safety   Based on caregiver fall risk screen, instruct family/caregiver to ask for assistance with transferring infant if caregiver noted to have fall risk factors  Note: Patient remains free from falls during this shift. Bed in lowest position, side rails up x2, call light/personal belongings/side table within reach. Patient calls out appropriately with call light for assistance with ambulation. Problem: Skin/Tissue Integrity  Goal: Absence of new skin breakdown  Description: 1. Monitor for areas of redness and/or skin breakdown  2. Assess vascular access sites hourly  3. Every 4-6 hours minimum:  Change oxygen saturation probe site  4. Every 4-6 hours:  If on nasal continuous positive airway pressure, respiratory therapy assess nares and determine need for appliance change or resting period.   1/7/2023 0431 by Eryn Cline RN  Outcome: Progressing     Problem: ABCDS Injury Assessment  Goal: Absence of physical injury  1/7/2023 0431 by Eryn Cline RN  Outcome: Progressing  Flowsheets (Taken 1/7/2023 1861)  Absence of Physical Injury: Implement safety measures based on patient assessment

## 2023-01-08 PROCEDURE — 6360000002 HC RX W HCPCS: Performed by: INTERNAL MEDICINE

## 2023-01-08 PROCEDURE — 99233 SBSQ HOSP IP/OBS HIGH 50: CPT | Performed by: INTERNAL MEDICINE

## 2023-01-08 PROCEDURE — 94640 AIRWAY INHALATION TREATMENT: CPT

## 2023-01-08 PROCEDURE — 6370000000 HC RX 637 (ALT 250 FOR IP): Performed by: INTERNAL MEDICINE

## 2023-01-08 PROCEDURE — 2580000003 HC RX 258: Performed by: INTERNAL MEDICINE

## 2023-01-08 PROCEDURE — 94660 CPAP INITIATION&MGMT: CPT

## 2023-01-08 PROCEDURE — 94761 N-INVAS EAR/PLS OXIMETRY MLT: CPT

## 2023-01-08 PROCEDURE — 6370000000 HC RX 637 (ALT 250 FOR IP): Performed by: NURSE PRACTITIONER

## 2023-01-08 PROCEDURE — 2700000000 HC OXYGEN THERAPY PER DAY

## 2023-01-08 PROCEDURE — 2060000000 HC ICU INTERMEDIATE R&B

## 2023-01-08 RX ORDER — METHYLPREDNISOLONE SODIUM SUCCINATE 40 MG/ML
40 INJECTION, POWDER, LYOPHILIZED, FOR SOLUTION INTRAMUSCULAR; INTRAVENOUS EVERY 12 HOURS
Status: DISCONTINUED | OUTPATIENT
Start: 2023-01-08 | End: 2023-01-11

## 2023-01-08 RX ADMIN — GUAIFENESIN 1200 MG: 600 TABLET, EXTENDED RELEASE ORAL at 09:26

## 2023-01-08 RX ADMIN — IPRATROPIUM BROMIDE AND ALBUTEROL SULFATE 1 AMPULE: 2.5; .5 SOLUTION RESPIRATORY (INHALATION) at 15:37

## 2023-01-08 RX ADMIN — CEFEPIME 2000 MG: 2 INJECTION, POWDER, FOR SOLUTION INTRAVENOUS at 12:18

## 2023-01-08 RX ADMIN — Medication 60 MG: at 09:26

## 2023-01-08 RX ADMIN — IPRATROPIUM BROMIDE AND ALBUTEROL SULFATE 1 AMPULE: 2.5; .5 SOLUTION RESPIRATORY (INHALATION) at 11:30

## 2023-01-08 RX ADMIN — IPRATROPIUM BROMIDE AND ALBUTEROL SULFATE 1 AMPULE: 2.5; .5 SOLUTION RESPIRATORY (INHALATION) at 03:56

## 2023-01-08 RX ADMIN — AMLODIPINE BESYLATE 10 MG: 10 TABLET ORAL at 09:26

## 2023-01-08 RX ADMIN — IPRATROPIUM BROMIDE AND ALBUTEROL SULFATE 1 AMPULE: 2.5; .5 SOLUTION RESPIRATORY (INHALATION) at 07:10

## 2023-01-08 RX ADMIN — METHYLPREDNISOLONE SODIUM SUCCINATE 40 MG: 40 INJECTION, POWDER, FOR SOLUTION INTRAMUSCULAR; INTRAVENOUS at 21:45

## 2023-01-08 RX ADMIN — Medication 60 MG: at 21:38

## 2023-01-08 RX ADMIN — ENOXAPARIN SODIUM 40 MG: 100 INJECTION SUBCUTANEOUS at 09:26

## 2023-01-08 RX ADMIN — THIAMINE HCL TAB 100 MG 100 MG: 100 TAB at 09:26

## 2023-01-08 RX ADMIN — IPRATROPIUM BROMIDE AND ALBUTEROL SULFATE 1 AMPULE: 2.5; .5 SOLUTION RESPIRATORY (INHALATION) at 23:45

## 2023-01-08 RX ADMIN — IPRATROPIUM BROMIDE AND ALBUTEROL SULFATE 1 AMPULE: 2.5; .5 SOLUTION RESPIRATORY (INHALATION) at 19:35

## 2023-01-08 RX ADMIN — BUDESONIDE AND FORMOTEROL FUMARATE DIHYDRATE 2 PUFF: 160; 4.5 AEROSOL RESPIRATORY (INHALATION) at 07:12

## 2023-01-08 RX ADMIN — GUAIFENESIN 1200 MG: 600 TABLET, EXTENDED RELEASE ORAL at 21:38

## 2023-01-08 RX ADMIN — FOLIC ACID 1 MG: 1 TABLET ORAL at 09:26

## 2023-01-08 RX ADMIN — CEFEPIME 2000 MG: 2 INJECTION, POWDER, FOR SOLUTION INTRAVENOUS at 21:50

## 2023-01-08 RX ADMIN — METHYLPREDNISOLONE SODIUM SUCCINATE 40 MG: 40 INJECTION, POWDER, FOR SOLUTION INTRAMUSCULAR; INTRAVENOUS at 03:05

## 2023-01-08 RX ADMIN — CEFEPIME 2000 MG: 2 INJECTION, POWDER, FOR SOLUTION INTRAVENOUS at 03:06

## 2023-01-08 NOTE — PROGRESS NOTES
DARRION Berkowitz 52    HISTORY AND PHYSICAL EXAMINATION            Date:   1/8/2023  Patient name:  Kyle Herring  Date of admission:  1/3/2023  5:10 PM  MRN:   [id number]  Account:  [de-identified]  YOB: 1965  PCP:    Arleth Cordova MD  Room:   2094/2094-01  Code Status:    Full Code    Chief Complaint:     Chief Complaint   Patient presents with    Respiratory Distress       History Obtained From:     patient, electronic medical record    History of Present Illness: The patient is a 61 y.o. Non- / non  male who presents with Respiratory Distress   and he is admitted to the hospital for the management of shortness of breath  Patient past medical history multiple medical problem which include hypertension, advanced COPD, chronic alcoholism, patient, was admitted with worsening shortness of breath, symptoms are going on for last few days before presentation  Patient apparently was not taking his medication  Was admitted at [facility] with [facility]  Patient denying complaints of chest pain  In emergency room COVID was positive, was cleared by pulmonary medicine team for droplet plus precautions  Patient in emergency room, was found to have pH of 7.2, PCO2 was too high to be recorded, remained on BiPAP overnight, repeat PCO2 is 91    1/5   Patient, clinically doing better  Did use BiPAP last night  On intermittent BiPAP  No new complaints      1/7  Patient got transferred out of ICU this morning  Complaining of worsening cough  Not able to get phlegm out  Will give  acapella  DC fluids      1/8  Had episode of worsening shortness of breath yesterday  Was placed on BiPAP  Also had 5 beat run of V. tach  Electrolytes were normal  Patient denied any chest pain or palpitations  Currently on BiPAP  States that he is feeling slightly better than yesterday  His vital signs been stable  Patient has been afebrile.     Past Medical History:     Past Medical History:   Diagnosis Date    COPD (chronic obstructive pulmonary disease) (HCC)     Hypertension         Past Surgical History:     History reviewed. No pertinent surgical history.     Medications Prior to Admission:     Prior to Admission medications    Medication Sig Start Date End Date Taking? Authorizing Provider   SYMBICORT 160-4.5 MCG/ACT AERO INHALE 2 PUFFS BY MOUTH ONCE DAILY 1/5/23   Nicolas Josue MD   traZODone (DESYREL) 50 MG tablet Take 1 tablet by mouth nightly 1/5/23   Nicolas Josue MD   SPIRIVA HANDIHALER 18 MCG inhalation capsule Inhale 1 puff by mouth once daily 1/5/23   Nicolas Josue MD   escitalopram (LEXAPRO) 10 MG tablet Take 1 tablet by mouth daily  Patient not taking: Reported on 1/3/2023 6/22/22   Erika Quiroz MD   folic acid (FOLVITE) 1 MG tablet Take 1 tablet by mouth daily 6/22/22   Erika Quiroz MD   thiamine 100 MG tablet Take 1 tablet by mouth daily 6/22/22   Erika Quiroz MD   vitamin D (ERGOCALCIFEROL) 1.25 MG (34634 UT) CAPS capsule Take 1 capsule by mouth once a week 6/22/22   Erika Quiroz MD   albuterol (PROVENTIL) (2.5 MG/3ML) 0.083% nebulizer solution Take 3 mLs by nebulization 4 times daily 6/7/22   Cynthia Monreal DO   amLODIPine (NORVASC) 10 MG tablet Take 1 tablet by mouth daily 3/25/22   Erika Quiroz MD        Allergies:     Patient has no known allergies.    Social History:     Tobacco:    reports that he has been smoking cigarettes. He has a 43.00 pack-year smoking history. He has never used smokeless tobacco.  Alcohol:      reports current alcohol use.  Drug Use:  reports that he does not currently use drugs.    Family History:     History reviewed. No pertinent family history.    Review of Systems:     Positive and Negative as described in HPI.    CONSTITUTIONAL:  negative for fevers, chills, sweats, fatigue, weight loss  HEENT:  negative for vision, hearing changes, runny nose, throat pain  RESPIRATORY: Shortness of breath,  cough  CARDIOVASCULAR:  negative for chest pain, palpitations. Patient has pain on right side of lower chest which worse with coughing  GASTROINTESTINAL:  negative for nausea, vomiting, diarrhea, constipation, change in bowel habits, abdominal pain   GENITOURINARY:  negative for difficulty of urination, burning with urination, frequency   INTEGUMENT:  negative for rash, skin lesions, easy bruising   HEMATOLOGIC/LYMPHATIC:  negative for swelling/edema   ALLERGIC/IMMUNOLOGIC:  negative for urticaria , itching  ENDOCRINE:  negative increase in drinking, increase in urination, hot or cold intolerance  MUSCULOSKELETAL:  negative joint pains, muscle aches, swelling of joints, clubbing present   NEUROLOGICAL:  negative for headaches, dizziness, lightheadedness, numbness, pain, tingling extremities  BEHAVIOR/PSYCH:  negative for depression, anxiety    Physical Exam:   /78   Pulse 73   Temp 98 °F (36.7 °C) (Axillary)   Resp 20   Ht 5' 7.72\" (1.72 m)   Wt 143 lb 15.4 oz (65.3 kg)   SpO2 96%   BMI 22.07 kg/m²   Temp (24hrs), Av.2 °F (36.8 °C), Min:97.8 °F (36.6 °C), Max:98.7 °F (37.1 °C)    No results for input(s): POCGLU in the last 72 hours. Intake/Output Summary (Last 24 hours) at 2023 1708  Last data filed at 2023 0313  Gross per 24 hour   Intake 540 ml   Output 1300 ml   Net -760 ml       General Appearance:  alert, well appearing, and in no acute distress  Mental status: oriented to person, place, and time with normal affect  Head:  normocephalic, atraumatic. Eye: no icterus, redness, pupils equal and reactive, extraocular eye movements intact, conjunctiva clear  Ear: normal external ear, no discharge, hearing intact  Nose:  no drainage noted  Mouth: mucous membranes moist  Neck: supple, no carotid bruits, thyroid not palpable  Lungs: Air entry, bilateral decreased, no rales, rhonchi  Cardiovascular: normal rate, regular rhythm, no murmur, gallop, rub.   Abdomen: Soft, nontender, nondistended, normal bowel sounds, no hepatomegaly or splenomegaly  Neurologic: There are no new focal motor or sensory deficits, normal muscle tone and bulk, no abnormal sensation, normal speech, cranial nerves II through XII grossly intact  Skin: No gross lesions, rashes, bruising or bleeding on exposed skin area  Extremities:  peripheral pulses palpable, no pedal edema or calf pain with palpation  Psych: normal affect     Investigations:      Laboratory Testing:  No results found for this or any previous visit (from the past 24 hour(s)).      Imaging/Diagnostics:      Assessment :      Primary Problem  COPD exacerbation (HCC)    Active Hospital Problems    Diagnosis Date Noted    COPD exacerbation (HCC) [J44.1] 06/22/2022     Priority: Medium       Plan:     Patient status Admit as inpatient in the  Medical ICU    Advanced COPD,, admitted with worsening shortness of breath,  Acute hypercapnic respiratory failure, on BiPAP, steroids, DuoNeb nebulization  Hypertension, restarted home dose of Norvasc, controlled  On DVT prophylaxis Lovenox  Patient is clubbing, CT chest done in 6/22 , which was negative   Overall prognosis guarded, given advanced COPD, chronic hypoxic respiratory failure  Patient had a left renal mass, which was seen in CT scan in June of this year, need urology input as outpatient  1/5   Patient feeling better  Complaining of cough and dryness of mouth with continuous BiPAP  Started on cough medication  Much improved  Will discuss with pulmonary medicine team, likely transfer out of ICU    1/6  Transferred out of ICU  On Mucinex  Not able to expectorate  Will give Acapella  Continues to be on IV steroids  Patient will likely long-term steroids  Will order PT OT eval.    1/7  Patient had episode of NSVT likely precipitated by underlying hypoxia  Electrolytes were checked which were normal  Troponin was normal  Will check echocardiogram  Chest x-ray did yesterday was nonacute  Patient currently on  BiPAP  Dose of Mucinex was increased yesterday  Advised to do Acapella to get secretions out  On antibiotics will complete total of 7 days. On IV steroids will likely need long-term steroids on discharge. CODE STATUS discussed again today patient wants to remain full code  Patient understands that he is critically sick  Will also need urology follow-up for left renal mass    1/8  No further episode of arrhythmia  Echocardiogram is pending  Patient continues to use BiPAP as needed during the day and at night  Using Acapella  On antibiotics  Dose of steroid decreased      Consultations:   IP CONSULT TO PULMONOLOGY  IP CONSULT TO PRIMARY CARE PROVIDER  IP CONSULT TO SOCIAL WORK    Patient is admitted as inpatient status because of co-morbidities listed above, severity of signs and symptoms as outlined, requirement for current medical therapies and most importantly because of direct risk to patient if care not provided in a hospital setting. Airam Lam MD  1/8/2023  5:08 PM    Copy sent to Dr. Kelton Aly MD    Please note that this chart was generated using voice recognition Dragon dictation software. Although every effort was made to ensure the accuracy of this automated transcription, some errors in transcription may have occurred.

## 2023-01-08 NOTE — PROGRESS NOTES
Pulmonary Progress Note  [facility]      Patient - Heather Barefoot,  Age - 61 y.o.    - 1965      Room Number - 2094/2094-01   N -  [id number]   [facility]  Date of Admission -  1/3/2023  5:10 PM        Miguelito Hua MD  Primary Care Physician - Geoff Cisneros MD     SUBJECTIVE   He wants to go to rehab, still using BiPAP quite frequently during the day and wears it at night  Also complains of sore throat  OBJECTIVE   VITALS    height is 5' 7.72\" (1.72 m) and weight is 143 lb 15.4 oz (65.3 kg). His oral temperature is 97.8 °F (36.6 °C). His blood pressure is 120/65 and his pulse is 75. His respiration is 20 and oxygen saturation is 95%. Body mass index is 22.07 kg/m². Temperature Range: Temp: 97.8 °F (36.6 °C) Temp  Av.4 °F (36.9 °C)  Min: 97.8 °F (36.6 °C)  Max: 98.8 °F (37.1 °C)  BP Range:  Systolic (79CMG), MZR:036 , Min:120 , ICP:353     Diastolic (32LNG), ART:54, Min:62, Max:88    Pulse Range: Pulse  Av.8  Min: 72  Max: 93  Respiration Range: Resp  Av.6  Min: 18  Max: 21  Current Pulse Ox[de-identified]  SpO2: 95 %  24HR Pulse Ox Range:  SpO2  Av.6 %  Min: 94 %  Max: 100 %  Oxygen Amount and Delivery: O2 Flow Rate (L/min): 4 L/min    Wt Readings from Last 3 Encounters:   23 143 lb 15.4 oz (65.3 kg)   22 154 lb 1.6 oz (69.9 kg)   22 140 lb 6.4 oz (63.7 kg)       I/O (24 Hours)    Intake/Output Summary (Last 24 hours) at 2023 1128  Last data filed at 2023 0313  Gross per 24 hour   Intake 540 ml   Output 1300 ml   Net -760 ml       EXAM     General Appearance  Awake, alert, oriented, in no acute distress  HEENT - normocephalic, atraumatic.  []  Mallampati  [x] Crowded airway   [x] Macroglossia  []  Retrognathia  [] Micrognathia  []  Normal tongue size []  Normal Bite  [] [place] sign positive  No thrush  Neck - Supple,  trachea midline   Lungs -diminished  Cardiovascular - Heart sounds are normal.  Regular rate and rhythm   Abdomen - Soft, nontender, nondistended, no masses or organomegaly  Neurologic - There are no focal motor or sensory deficits  Skin - No bruising or bleeding  Extremities - No clubbing, cyanosis, edema    MEDS      guaiFENesin  1,200 mg Oral BID    dextromethorphan  60 mg Oral 2 times per day    methylPREDNISolone  40 mg IntraVENous Q8H    ipratropium-albuterol  1 ampule Inhalation Q4H    enoxaparin  40 mg SubCUTAneous Daily    cefepime  2,000 mg IntraVENous Q8H    amLODIPine  10 mg Oral Daily    folic acid  1 mg Oral Daily    budesonide-formoterol  2 puff Inhalation Daily    thiamine mononitrate  100 mg Oral Daily    sodium chloride flush  5-40 mL IntraVENous 2 times per day      sodium chloride       ipratropium-albuterol, sodium chloride flush, sodium chloride, ondansetron **OR** ondansetron, polyethylene glycol, acetaminophen **OR** acetaminophen    LABS   CBC   Recent Labs     01/07/23  0541   WBC 5.1   HGB 10.8*   HCT 32.9*   MCV 94.0        BMP:   Lab Results   Component Value Date/Time     01/07/2023 05:41 AM    K 4.4 01/07/2023 05:41 AM    CL 90 01/07/2023 05:41 AM    CO2 >45 01/07/2023 05:41 AM    BUN 11 01/07/2023 05:41 AM    LABALBU 4.0 06/22/2022 03:43 PM    CREATININE <0.40 01/07/2023 05:41 AM    CALCIUM 8.8 01/07/2023 05:41 AM    GFRAA >60 06/24/2022 04:47 AM    LABGLOM Can not be calculated 01/07/2023 05:41 AM     ABGs:  Lab Results   Component Value Date/Time    PHART 7.317 01/03/2023 06:34 PM    PO2ART 67.8 01/03/2023 06:34 PM    MPI8YMD 92.8 01/03/2023 06:34 PM      Lab Results   Component Value Date/Time    MODE IPAP18/EPAP8 01/03/2023 06:34 PM     Ionized Calcium:  No results found for: IONCA  Magnesium:    Lab Results   Component Value Date/Time    MG 2.1 01/06/2023 05:15 PM     Phosphorus:  No results found for: PHOS     LIVER PROFILE No results for input(s): AST, ALT, LIPASE, BILIDIR, BILITOT, ALKPHOS in the last 72 hours. Invalid input(s): AMYLASE,  ALB  INR No results for input(s): INR in the last 72 hours.   PTT No results found for: APTT      RADIOLOGY     (See actual reports for details)    ASSESSMENT/PLAN     Acute on chronic hypoxic and hypercapnic respiratory failure  Severe's COPD clinically with acute exacerbation  History of tobacco use  History of recent COVID infection early December at [facility] 72  Full code      Would strongly recommend that he go to ECF  Continue BiPAP during the day as needed-if he goes to ECF need to make sure that they have a BiPAP available for him  Told him he needs to get out of bed and sit in the chair  Continue DuoNebs every 4 hours  Decrease Solu-Medrol to 40 every 12  Electronically signed by Cat Min MD on 1/8/2023 at 11:28 AM

## 2023-01-08 NOTE — PLAN OF CARE
Problem: Discharge Planning  Goal: Discharge to home or other facility with appropriate resources  1/8/2023 1623 by Mariangel Blake RN  Outcome: Progressing  Flowsheets (Taken 1/7/2023 1641)  Discharge to home or other facility with appropriate resources:   Identify barriers to discharge with patient and caregiver   Arrange for needed discharge resources and transportation as appropriate   Identify discharge learning needs (meds, wound care, etc)  1/8/2023 1623 by Mariangel Blake RN  Outcome: Progressing  1/8/2023 0404 by Ramakrishna Salazar RN  Outcome: Progressing     Problem: Safety - Adult  Goal: Free from fall injury  1/8/2023 1623 by Mariangel Blake RN  Outcome: Progressing  Flowsheets (Taken 1/8/2023 1623)  Free From Fall Injury:   Norita Ahumada family/caregiver on patient safety   Based on caregiver fall risk screen, instruct family/caregiver to ask for assistance with transferring infant if caregiver noted to have fall risk factors  Note: The patient remained free from falls this shift, call light within reach, bed in locked and lowest position. Side rails up x2. Continue to monitor closely. 1/8/2023 1623 by Mariangel Blake RN  Outcome: Progressing  Flowsheets (Taken 1/8/2023 1623)  Free From Fall Injury:   Norita Ahumada family/caregiver on patient safety   Based on caregiver fall risk screen, instruct family/caregiver to ask for assistance with transferring infant if caregiver noted to have fall risk factors  1/8/2023 0404 by Ramakrishna Salazar RN  Outcome: Progressing     Problem: Skin/Tissue Integrity  Goal: Absence of new skin breakdown  Description: 1. Monitor for areas of redness and/or skin breakdown  2. Assess vascular access sites hourly  3. Every 4-6 hours minimum:  Change oxygen saturation probe site  4. Every 4-6 hours:  If on nasal continuous positive airway pressure, respiratory therapy assess nares and determine need for appliance change or resting period.   1/8/2023 1623 by Mariangel Blake RN  Outcome: Progressing  Note: Skin assessment complete. Waffle mattress in place. Pt turned self. Skin remains clean, dry, and intact. Will continue to monitor for additional needs and changes in skin breakdown.    1/8/2023 1623 by Livan Jacob RN  Outcome: Progressing  1/8/2023 0404 by Donna Ge RN  Outcome: Progressing     Problem: ABCDS Injury Assessment  Goal: Absence of physical injury  1/8/2023 1623 by Livan Jacob RN  Outcome: Progressing  Flowsheets (Taken 1/7/2023 1641)  Absence of Physical Injury: Implement safety measures based on patient assessment  1/8/2023 1623 by Livan Jacob RN  Outcome: Progressing  1/8/2023 0404 by Donna Ge RN  Outcome: Progressing

## 2023-01-08 NOTE — PLAN OF CARE
Problem: Discharge Planning  Goal: Discharge to home or other facility with appropriate resources  1/8/2023 0404 by Mika Jay RN  Outcome: Progressing     Problem: Safety - Adult  Goal: Free from fall injury  1/8/2023 0404 by Mika Jay RN  Outcome: Progressing     Problem: Skin/Tissue Integrity  Goal: Absence of new skin breakdown  Description: 1. Monitor for areas of redness and/or skin breakdown  2. Assess vascular access sites hourly  3. Every 4-6 hours minimum:  Change oxygen saturation probe site  4. Every 4-6 hours:  If on nasal continuous positive airway pressure, respiratory therapy assess nares and determine need for appliance change or resting period.   1/8/2023 0404 by Mika Jay RN  Outcome: Progressing

## 2023-01-09 ENCOUNTER — APPOINTMENT (OUTPATIENT)
Dept: NON INVASIVE DIAGNOSTICS | Age: 58
DRG: 140 | End: 2023-01-09
Payer: COMMERCIAL

## 2023-01-09 LAB
ABSOLUTE BANDS #: 0.13 K/UL (ref 0–1)
ABSOLUTE EOS #: 0 K/UL (ref 0–0.4)
ABSOLUTE LYMPH #: 0.38 K/UL (ref 1–4.8)
ABSOLUTE MONO #: 0.19 K/UL (ref 0.1–1.3)
ANION GAP SERPL CALCULATED.3IONS-SCNC: 3 MMOL/L (ref 9–17)
BANDS: 2 % (ref 0–10)
BASOPHILS # BLD: 0 % (ref 0–2)
BASOPHILS ABSOLUTE: 0 K/UL (ref 0–0.2)
BUN BLDV-MCNC: 17 MG/DL (ref 6–20)
CALCIUM SERPL-MCNC: 8.4 MG/DL (ref 8.6–10.4)
CHLORIDE BLD-SCNC: 88 MMOL/L (ref 98–107)
CO2: 44 MMOL/L (ref 20–31)
CREAT SERPL-MCNC: 0.41 MG/DL (ref 0.7–1.2)
EKG ATRIAL RATE: 80 BPM
EKG P AXIS: 80 DEGREES
EKG P-R INTERVAL: 142 MS
EKG Q-T INTERVAL: 340 MS
EKG QRS DURATION: 84 MS
EKG QTC CALCULATION (BAZETT): 392 MS
EKG R AXIS: 71 DEGREES
EKG T AXIS: 79 DEGREES
EKG VENTRICULAR RATE: 80 BPM
EOSINOPHILS RELATIVE PERCENT: 0 % (ref 0–4)
GFR SERPL CREATININE-BSD FRML MDRD: >60 ML/MIN/1.73M2
GLUCOSE BLD-MCNC: 204 MG/DL (ref 70–99)
GLUCOSE BLD-MCNC: 215 MG/DL (ref 75–110)
HCT VFR BLD CALC: 32.5 % (ref 41–53)
HEMOGLOBIN: 10.8 G/DL (ref 13.5–17.5)
LV EF: 55 %
LVEF MODALITY: NORMAL
LYMPHOCYTES # BLD: 6 % (ref 24–44)
MCH RBC QN AUTO: 30.9 PG (ref 26–34)
MCHC RBC AUTO-ENTMCNC: 33.2 G/DL (ref 31–37)
MCV RBC AUTO: 93.2 FL (ref 80–100)
METAMYELOCYTES ABSOLUTE COUNT: 0.13 K/UL
METAMYELOCYTES: 2 %
MONOCYTES # BLD: 3 % (ref 1–7)
MORPHOLOGY: ABNORMAL
MYELOCYTES ABSOLUTE COUNT: 0.19 K/UL
MYELOCYTES: 3 %
PDW BLD-RTO: 13 % (ref 11.5–14.9)
PLATELET # BLD: 276 K/UL (ref 150–450)
PMV BLD AUTO: 6.3 FL (ref 6–12)
POTASSIUM SERPL-SCNC: 4.7 MMOL/L (ref 3.7–5.3)
RBC # BLD: 3.49 M/UL (ref 4.5–5.9)
SEG NEUTROPHILS: 84 % (ref 36–66)
SEGMENTED NEUTROPHILS ABSOLUTE COUNT: 5.38 K/UL (ref 1.3–9.1)
SODIUM BLD-SCNC: 135 MMOL/L (ref 135–144)
WBC # BLD: 6.4 K/UL (ref 3.5–11)

## 2023-01-09 PROCEDURE — 94761 N-INVAS EAR/PLS OXIMETRY MLT: CPT

## 2023-01-09 PROCEDURE — 6360000002 HC RX W HCPCS: Performed by: INTERNAL MEDICINE

## 2023-01-09 PROCEDURE — 94640 AIRWAY INHALATION TREATMENT: CPT

## 2023-01-09 PROCEDURE — 36415 COLL VENOUS BLD VENIPUNCTURE: CPT

## 2023-01-09 PROCEDURE — 2580000003 HC RX 258: Performed by: INTERNAL MEDICINE

## 2023-01-09 PROCEDURE — 82947 ASSAY GLUCOSE BLOOD QUANT: CPT

## 2023-01-09 PROCEDURE — 99233 SBSQ HOSP IP/OBS HIGH 50: CPT | Performed by: INTERNAL MEDICINE

## 2023-01-09 PROCEDURE — 85025 COMPLETE CBC W/AUTO DIFF WBC: CPT

## 2023-01-09 PROCEDURE — 2700000000 HC OXYGEN THERAPY PER DAY

## 2023-01-09 PROCEDURE — 6370000000 HC RX 637 (ALT 250 FOR IP): Performed by: INTERNAL MEDICINE

## 2023-01-09 PROCEDURE — 6370000000 HC RX 637 (ALT 250 FOR IP): Performed by: NURSE PRACTITIONER

## 2023-01-09 PROCEDURE — 80048 BASIC METABOLIC PNL TOTAL CA: CPT

## 2023-01-09 PROCEDURE — 2060000000 HC ICU INTERMEDIATE R&B

## 2023-01-09 PROCEDURE — 94660 CPAP INITIATION&MGMT: CPT

## 2023-01-09 PROCEDURE — 93306 TTE W/DOPPLER COMPLETE: CPT

## 2023-01-09 RX ADMIN — IPRATROPIUM BROMIDE AND ALBUTEROL SULFATE 1 AMPULE: 2.5; .5 SOLUTION RESPIRATORY (INHALATION) at 11:23

## 2023-01-09 RX ADMIN — GUAIFENESIN 1200 MG: 600 TABLET, EXTENDED RELEASE ORAL at 20:35

## 2023-01-09 RX ADMIN — METHYLPREDNISOLONE SODIUM SUCCINATE 40 MG: 40 INJECTION, POWDER, FOR SOLUTION INTRAMUSCULAR; INTRAVENOUS at 10:48

## 2023-01-09 RX ADMIN — CEFEPIME 2000 MG: 2 INJECTION, POWDER, FOR SOLUTION INTRAVENOUS at 06:03

## 2023-01-09 RX ADMIN — IPRATROPIUM BROMIDE AND ALBUTEROL SULFATE 1 AMPULE: 2.5; .5 SOLUTION RESPIRATORY (INHALATION) at 07:34

## 2023-01-09 RX ADMIN — THIAMINE HCL TAB 100 MG 100 MG: 100 TAB at 10:02

## 2023-01-09 RX ADMIN — FOLIC ACID 1 MG: 1 TABLET ORAL at 10:02

## 2023-01-09 RX ADMIN — CEFEPIME 2000 MG: 2 INJECTION, POWDER, FOR SOLUTION INTRAVENOUS at 20:35

## 2023-01-09 RX ADMIN — ENOXAPARIN SODIUM 40 MG: 100 INJECTION SUBCUTANEOUS at 10:02

## 2023-01-09 RX ADMIN — CEFEPIME 2000 MG: 2 INJECTION, POWDER, FOR SOLUTION INTRAVENOUS at 12:42

## 2023-01-09 RX ADMIN — AMLODIPINE BESYLATE 10 MG: 10 TABLET ORAL at 10:02

## 2023-01-09 RX ADMIN — IPRATROPIUM BROMIDE AND ALBUTEROL SULFATE 1 AMPULE: 2.5; .5 SOLUTION RESPIRATORY (INHALATION) at 14:56

## 2023-01-09 RX ADMIN — BUDESONIDE AND FORMOTEROL FUMARATE DIHYDRATE 2 PUFF: 160; 4.5 AEROSOL RESPIRATORY (INHALATION) at 07:51

## 2023-01-09 RX ADMIN — GUAIFENESIN 1200 MG: 600 TABLET, EXTENDED RELEASE ORAL at 10:02

## 2023-01-09 RX ADMIN — IPRATROPIUM BROMIDE AND ALBUTEROL SULFATE 1 AMPULE: 2.5; .5 SOLUTION RESPIRATORY (INHALATION) at 23:50

## 2023-01-09 RX ADMIN — Medication 60 MG: at 20:35

## 2023-01-09 RX ADMIN — Medication 60 MG: at 10:02

## 2023-01-09 RX ADMIN — IPRATROPIUM BROMIDE AND ALBUTEROL SULFATE 1 AMPULE: 2.5; .5 SOLUTION RESPIRATORY (INHALATION) at 19:21

## 2023-01-09 NOTE — PROGRESS NOTES
DARRION Berkowitz 52    HISTORY AND PHYSICAL EXAMINATION            Date:   1/9/2023  Patient name:  Oscar Dorman  Date of admission:  1/3/2023  5:10 PM  MRN:   [id number]  Account:  [de-identified]  YOB: 1965  PCP:    Nyoka Hodgkins, MD  Room:   2094/2094-01  Code Status:    Full Code    Chief Complaint:     Chief Complaint   Patient presents with    Respiratory Distress       History Obtained From:     patient, electronic medical record    History of Present Illness: The patient is a 61 y.o. Non- / non  male who presents with Respiratory Distress   and he is admitted to the hospital for the management of shortness of breath  Patient past medical history multiple medical problem which include hypertension, advanced COPD, chronic alcoholism, patient, was admitted with worsening shortness of breath, symptoms are going on for last few days before presentation  Patient apparently was not taking his medication  Was admitted at [facility]  Patient denying complaints of chest pain  In emergency room COVID was positive, was cleared by pulmonary medicine team for droplet plus precautions  Patient in emergency room, was found to have pH of 7.2, PCO2 was too high to be recorded, remained on BiPAP overnight, repeat PCO2 is 91    1/5   Patient, clinically doing better  Did use BiPAP last night  On intermittent BiPAP  No new complaints      1/7  Patient got transferred out of ICU this morning  Complaining of worsening cough  Not able to get phlegm out  Will give  acapella  DC fluids      1/8  Had episode of worsening shortness of breath yesterday  Was placed on BiPAP  Also had 5 beat run of V. tach  Electrolytes were normal  Patient denied any chest pain or palpitations  Currently on BiPAP  States that he is feeling slightly better than yesterday  His vital signs been stable  Patient has been afebrile.       1/9  On BiPAP  States that he is feeling the same  Denies any chest pain  Has been afebrile  Echocardiogram pending. Past Medical History:     Past Medical History:   Diagnosis Date    COPD (chronic obstructive pulmonary disease) ([address]     Hypertension         Past Surgical History:     History reviewed. No pertinent surgical history. Medications Prior to Admission:     Prior to Admission medications    Medication Sig Start Date End Date Taking? Authorizing Provider   SYMBICORT 160-4.5 MCG/ACT AERO INHALE 2 PUFFS BY MOUTH ONCE DAILY 1/5/23   Chris White MD   traZODone (DESYREL) 50 MG tablet Take 1 tablet by mouth nightly 1/5/23   Chris White MD   Tanda Tressa 18 MCG inhalation capsule Inhale 1 puff by mouth once daily 1/5/23   Chris White MD   escitalopram (LEXAPRO) 10 MG tablet Take 1 tablet by mouth daily  Patient not taking: Reported on 1/3/2023 6/22/22   Dominick Lerma MD   folic acid (FOLVITE) 1 MG tablet Take 1 tablet by mouth daily 6/22/22   Dominick Lerma MD   thiamine 100 MG tablet Take 1 tablet by mouth daily 6/22/22   Dominick Lerma MD   vitamin D (ERGOCALCIFEROL) 1.25 MG (68109 UT) CAPS capsule Take 1 capsule by mouth once a week 6/22/22   Dominick Lerma MD   albuterol (PROVENTIL) (2.5 MG/3ML) 0.083% nebulizer solution Take 3 mLs by nebulization 4 times daily 6/7/22   Gissell Gonzales DO   amLODIPine (NORVASC) 10 MG tablet Take 1 tablet by mouth daily 3/25/22   Dominick Lerma MD        Allergies:     Patient has no known allergies. Social History:     Tobacco:    reports that he has been smoking cigarettes. He has a 43.00 pack-year smoking history. He has never used smokeless tobacco.  Alcohol:      reports current alcohol use. Drug Use:  reports that he does not currently use drugs. Family History:     History reviewed. No pertinent family history. Review of Systems:     Positive and Negative as described in HPI.     CONSTITUTIONAL:  negative for fevers, chills, sweats, fatigue, weight loss  HEENT:  negative for vision, hearing changes, runny nose, throat pain  RESPIRATORY: Shortness of breath, cough  CARDIOVASCULAR:  negative for chest pain, palpitations. Patient has pain on right side of lower chest which worse with coughing  GASTROINTESTINAL:  negative for nausea, vomiting, diarrhea, constipation, change in bowel habits, abdominal pain   GENITOURINARY:  negative for difficulty of urination, burning with urination, frequency   INTEGUMENT:  negative for rash, skin lesions, easy bruising   HEMATOLOGIC/LYMPHATIC:  negative for swelling/edema   ALLERGIC/IMMUNOLOGIC:  negative for urticaria , itching  ENDOCRINE:  negative increase in drinking, increase in urination, hot or cold intolerance  MUSCULOSKELETAL:  negative joint pains, muscle aches, swelling of joints, clubbing present   NEUROLOGICAL:  negative for headaches, dizziness, lightheadedness, numbness, pain, tingling extremities  BEHAVIOR/PSYCH:  negative for depression, anxiety    Physical Exam:   /80   Pulse (!) 101   Temp 98.2 °F (36.8 °C) (Oral)   Resp 18   Ht 5' 7.72\" (1.72 m)   Wt 137 lb 12.6 oz (62.5 kg)   SpO2 (!) 89%   BMI 21.13 kg/m²   Temp (24hrs), Av.3 °F (36.8 °C), Min:97.6 °F (36.4 °C), Max:99 °F (37.2 °C)    Recent Labs     23  1600   POCGLU 215*       Intake/Output Summary (Last 24 hours) at 2023 1744  Last data filed at 2023 1643  Gross per 24 hour   Intake 740 ml   Output 525 ml   Net 215 ml       General Appearance:  alert, well appearing, and in no acute distress  Mental status: oriented to person, place, and time with normal affect  Head:  normocephalic, atraumatic.   Eye: no icterus, redness, pupils equal and reactive, extraocular eye movements intact, conjunctiva clear  Ear: normal external ear, no discharge, hearing intact  Nose:  no drainage noted  Mouth: mucous membranes moist  Neck: supple, no carotid bruits, thyroid not palpable  Lungs: Air entry, bilateral decreased, no rales, rhonchi  Cardiovascular: normal rate, regular rhythm, no murmur, gallop, rub.   Abdomen: Soft, nontender, nondistended, normal bowel sounds, no hepatomegaly or splenomegaly  Neurologic: There are no new focal motor or sensory deficits, normal muscle tone and bulk, no abnormal sensation, normal speech, cranial nerves II through XII grossly intact  Skin: No gross lesions, rashes, bruising or bleeding on exposed skin area  Extremities:  peripheral pulses palpable, no pedal edema or calf pain with palpation  Psych: normal affect     Investigations:      Laboratory Testing:  Recent Results (from the past 24 hour(s))   CBC with Auto Differential    Collection Time: 01/09/23  5:43 AM   Result Value Ref Range    WBC 6.4 3.5 - 11.0 k/uL    RBC 3.49 (L) 4.5 - 5.9 m/uL    Hemoglobin 10.8 (L) 13.5 - 17.5 g/dL    Hematocrit 32.5 (L) 41 - 53 %    MCV 93.2 80 - 100 fL    MCH 30.9 26 - 34 pg    MCHC 33.2 31 - 37 g/dL    RDW 13.0 11.5 - 14.9 %    Platelets 692 531 - 910 k/uL    MPV 6.3 6.0 - 12.0 fL    Seg Neutrophils 84 (H) 36 - 66 %    Lymphocytes 6 (L) 24 - 44 %    Monocytes 3 1 - 7 %    Eosinophils % 0 0 - 4 %    Basophils 0 0 - 2 %    Bands 2 0 - 10 %    Metamyelocytes 2 (H) 0 %    Myelocytes 3 (H) 0 %    Segs Absolute 5.38 1.3 - 9.1 k/uL    Absolute Lymph # 0.38 (L) 1.0 - 4.8 k/uL    Absolute Mono # 0.19 0.1 - 1.3 k/uL    Absolute Eos # 0.00 0.0 - 0.4 k/uL    Basophils Absolute 0.00 0.0 - 0.2 k/uL    Absolute Bands # 0.13 0.0 - 1.0 k/uL    Metamyelocytes Absolute 0.13 (H) 0 k/uL    Myelocytes Absolute 0.19 (H) 0 k/uL    Morphology ANISOCYTOSIS PRESENT     Morphology HYPOCHROMIA PRESENT     Morphology TOXIC GRANULATION PRESENT     Morphology BASOPHILIC STIPPLING PRESENT    Basic Metabolic Panel    Collection Time: 01/09/23  5:43 AM   Result Value Ref Range    Glucose 204 (H) 70 - 99 mg/dL    BUN 17 6 - 20 mg/dL    Creatinine 0.41 (L) 0.70 - 1.20 mg/dL    Est, Glom Filt Rate >60 >60 mL/min/1.73m2    Calcium 8.4 (L) 8.6 - 10.4 mg/dL    Sodium 135 135 - 144 mmol/L    Potassium 4.7 3.7 - 5.3 mmol/L    Chloride 88 (L) 98 - 107 mmol/L    CO2 44 (HH) 20 - 31 mmol/L    Anion Gap 3 (L) 9 - 17 mmol/L   POC Glucose Fingerstick    Collection Time: 01/09/23  4:00 PM   Result Value Ref Range    POC Glucose 215 (H) 75 - 110 mg/dL         Imaging/Diagnostics:      Assessment :      Primary Problem  COPD exacerbation [facility])    Active Hospital Problems    Diagnosis Date Noted    COPD exacerbation ([address] [J44.1] 06/22/2022     Priority: Medium       Plan:     Patient status Admit as inpatient in the  Medical ICU    Advanced COPD,, admitted with worsening shortness of breath,  Acute hypercapnic respiratory failure, on BiPAP, steroids, DuoNeb nebulization  Hypertension, restarted home dose of Norvasc, controlled  On DVT prophylaxis Lovenox  Patient is clubbing, CT chest done in 6/22 , which was negative   Overall prognosis guarded, given advanced COPD, chronic hypoxic respiratory failure  Patient had a left renal mass, which was seen in CT scan in June of this year, need urology input as outpatient  1/5   Patient feeling better  Complaining of cough and dryness of mouth with continuous BiPAP  Started on cough medication  Much improved  Will discuss with pulmonary medicine team, likely transfer out of ICU    1/6  Transferred out of ICU  On Mucinex  Not able to expectorate  Will give Acapella  Continues to be on IV steroids  Patient will likely long-term steroids  Will order PT OT eval.    1/7  Patient had episode of NSVT likely precipitated by underlying hypoxia  Electrolytes were checked which were normal  Troponin was normal  Will check echocardiogram  Chest x-ray did yesterday was nonacute  Patient currently on BiPAP  Dose of Mucinex was increased yesterday  Advised to do Acapella to get secretions out  On antibiotics will complete total of 7 days. On IV steroids will likely need long-term steroids on discharge.   CODE STATUS discussed again today patient wants to remain full code  Patient understands that he is critically sick  Will also need urology follow-up for left renal mass    1/8  No further episode of arrhythmia  Echocardiogram is pending  Patient continues to use BiPAP as needed during the day and at night  Using Acapella  On antibiotics  Dose of steroid decreased    1/9  No more episode of NSVT noted  Echocardiogram is pending  On antibiotics IV steroids  Dose of steroid decreased to 40 mg every 12  Patient using BiPAP as needed during the day at night  Overall prognosis is poor  Discussed the CODE STATUS patient wants to be full code. Consultations:   IP CONSULT TO PULMONOLOGY  IP CONSULT TO PRIMARY CARE PROVIDER  IP CONSULT TO SOCIAL WORK    Patient is admitted as inpatient status because of co-morbidities listed above, severity of signs and symptoms as outlined, requirement for current medical therapies and most importantly because of direct risk to patient if care not provided in a hospital setting. Danish Reece MD  1/9/2023  5:44 PM    Copy sent to Dr. Néstor Zamudio MD    Please note that this chart was generated using voice recognition Dragon dictation software. Although every effort was made to ensure the accuracy of this automated transcription, some errors in transcription may have occurred.

## 2023-01-09 NOTE — CARE COORDINATION
ONGOING DISCHARGE PLAN:    Patient is alert and oriented x4. Spoke with patient regarding discharge plan and patient confirms that plan is still to DC to SNF. Referrals were sent to [facility]. Writer spoke to William Chirinos, from [address], & she is accepting pt & will need Pre-Cert, but this will take 4-5 days to get. Pt. Needs to work w/ therapy before Pre-cert can be started, today. Pt's Nurse, Jannette Alejo, notified, of above. She will follow. Pt. Remains on IV Cefepime & Iv Steroids, 40 Q12. ..    Currently sating, 95% on 6LNC/BIPAP, Pulmonary on board. Will continue to follow for additional discharge needs.     Electronically signed by Ezra Cortes RN on 1/9/2023 at 11:04 AM

## 2023-01-09 NOTE — PROGRESS NOTES
Physical Therapy        Physical Therapy Cancel Note      DATE: 2023    NAME: Viral Drake  MRN: [id number]   : 1965      Patient not seen this date for Physical Therapy due to:    Patient Declined: Pt refuses PT at this time d/t increase fatigue and requesting to rest in bed. Will continue to check on pt for PT services per POC.        Electronically signed by Linda Miguel PTA on 2023 at 3:32 PM

## 2023-01-09 NOTE — PROGRESS NOTES
Primitivo Hurd MD/Mukul Palm MD/ Dr. Reford Runner Dr. Belvie Arias Dr. Winifred Roche AGACNP-BC, NP-C      [facility] [place] APRN NP-C          [id number] [facility] APRN - NP-C                                      Critical Care / Pulmonary Progress Note    Patient - Elvira Middleton   Age - 61 y.o.   - 1965  MRN - [id number]  Acct # - [de-identified]  Date of Admission - 1/3/2023  5:10 PM    Consulting Service/Physician:       Primary Care Physician: Antonina Coronel MD    SUBJECTIVE:     Chief Complaint:   Chief Complaint   Patient presents with    Respiratory Distress     Subjective:    Patient was on 6 L nasal cannula. His baseline is 5. I did decrease his oxygen back down to his baseline. He is to me he is feeling a little bit better. Plans for skilled nursing placement eventually  VITALS  BP (!) 106/57   Pulse 82   Temp 99 °F (37.2 °C) (Oral)   Resp 18   Ht 5' 7.72\" (1.72 m)   Wt 137 lb 12.6 oz (62.5 kg)   SpO2 95%   BMI 21.13 kg/m²   Wt Readings from Last 3 Encounters:   23 137 lb 12.6 oz (62.5 kg)   22 154 lb 1.6 oz (69.9 kg)   22 140 lb 6.4 oz (63.7 kg)     I/O (24 Hours)    Intake/Output Summary (Last 24 hours) at 2023 1052  Last data filed at 2023 0150  Gross per 24 hour   Intake 200 ml   Output 525 ml   Net -325 ml     Ventilator:   Settings  FiO2 : 40 %  Insp Rise Time (%): 4 %  Exam:   Physical Exam   Constitutional: Overall thin, nontoxic  HENT: Unremarkable  Head: Normocephalic and atraumatic. Eyes: EOM are normal. Pupils are equal, round, and reactive to light. Neck: Neck supple. Cardiovascular:  Regular rate and rhythm. Normal heart tones. No JVD. Pulmonary/Chest: Effort normal and breath sounds-throughout diminished  Abdominal: Soft. Bowel sounds are normal. There is no tenderness. Musculoskeletal: Fairly weak  Neurological:patient is alert and oriented to person, place, and time.    Skin: Skin is warm and dry. No rash noted.    Extremities: No edema or discoloration  Infusions:      sodium chloride       Meds:     Current Facility-Administered Medications:     perflutren lipid microspheres (DEFINITY) injection 1.5 mL, 1.5 mL, IntraVENous, ONCE PRN, Yandel Og MD    phenol 1.4 % mouth spray 1 spray, 1 spray, Mouth/Throat, Q2H PRN, Tino Thompson MD    methylPREDNISolone sodium (SOLU-MEDROL) injection 40 mg, 40 mg, IntraVENous, Q12H, Tino Thompson MD, 40 mg at 01/09/23 1048    guaiFENesin (MUCINEX) extended release tablet 1,200 mg, 1,200 mg, Oral, BID, Tino Thompson MD, 1,200 mg at 01/09/23 1002    dextromethorphan (DELSYM) 30 MG/5ML extended release liquid 60 mg, 60 mg, Oral, 2 times per day, Tino Thompson MD, 60 mg at 01/09/23 1002    ipratropium-albuterol (DUONEB) nebulizer solution 1 ampule, 1 ampule, Inhalation, Q4H, Tino Thompson MD, 1 ampule at 01/09/23 0734    ipratropium-albuterol (DUONEB) nebulizer solution 1 ampule, 1 ampule, Inhalation, Q4H PRN, Tino Thompson MD    enoxaparin (LOVENOX) injection 40 mg, 40 mg, SubCUTAneous, Daily, Lalo Ho MD, 40 mg at 01/09/23 1002    [COMPLETED] cefepime (MAXIPIME) 2,000 mg in sodium chloride 0.9 % 50 mL IVPB mini-bag, 2,000 mg, IntraVENous, Once, Stopped at 01/03/23 2003 **FOLLOWED BY** cefepime (MAXIPIME) 2,000 mg in sodium chloride 0.9 % 50 mL IVPB mini-bag, 2,000 mg, IntraVENous, Q8H, Lalo Ho MD, Stopped at 01/09/23 1008    amLODIPine (NORVASC) tablet 10 mg, 10 mg, Oral, Daily, ERICA Sow NP, 10 mg at 21/54/25 2756    folic acid (FOLVITE) tablet 1 mg, 1 mg, Oral, Daily, ERICA Sow NP, 1 mg at 01/09/23 1002    budesonide-formoterol (SYMBICORT) 160-4.5 MCG/ACT inhaler 2 puff, 2 puff, Inhalation, Daily, ERICA Sow NP, 2 puff at 01/09/23 0751    thiamine mononitrate tablet 100 mg, 100 mg, Oral, Daily, ERICA Sow NP, 100 mg at 01/09/23 1002    sodium chloride flush 0.9 % injection 5-40 mL, 5-40 mL, IntraVENous, 2 times per day, Maya Valdovinos, APRN - NP    sodium chloride flush 0.9 % injection 5-40 mL, 5-40 mL, IntraVENous, PRN, Jennifer Neal APRN - NP    0.9 % sodium chloride infusion, , IntraVENous, PRN, Maya Valdovinos, APRN - NP    ondansetron (ZOFRAN-ODT) disintegrating tablet 4 mg, 4 mg, Oral, Q8H PRN **OR** ondansetron (ZOFRAN) injection 4 mg, 4 mg, IntraVENous, Q6H PRN, Maya Valdovinos, APRN - NP    polyethylene glycol (GLYCOLAX) packet 17 g, 17 g, Oral, Daily PRN, Maya Valdovinos APRN - NP    acetaminophen (TYLENOL) tablet 650 mg, 650 mg, Oral, Q6H PRN **OR** acetaminophen (TYLENOL) suppository 650 mg, 650 mg, Rectal, Q6H PRN, Maya Valdovinos APRN - NP    Lab Results:     [unfilled]  Lab Results   Component Value Date    WBC 6.4 01/09/2023    HGB 10.8 (L) 01/09/2023    HCT 32.5 (L) 01/09/2023    MCV 93.2 01/09/2023     01/09/2023     Lab Results   Component Value Date    CALCIUM 8.4 (L) 01/09/2023     01/09/2023    K 4.7 01/09/2023    CO2 44 (HH) 01/09/2023    CL 88 (L) 01/09/2023    BUN 17 01/09/2023    CREATININE 0.41 (L) 01/09/2023              ASSESSMENT:   Principal Problem:    COPD exacerbation ([address]  Resolved Problems:    * No resolved hospital problems. *      Acute on chronic hypoxemic respiratory failure baseline 5L  Acute on chronic hypercapnic respiratory failure  COPD exacerbation  History of severe COPD; maintained on Spiriva, Symbicort Proventil- follows with Dr. Nima Anderson COVID-19 infection in December at [facility] abuse  Hypertension  PLAN:   Eventual plans for skilled nursing facility  Continue with BiPAP during the day as needed he will need BiPAP nocturnally as well. However I did contact Elijah Newberry with [facility]. With patient having chronic hypercapnic respiratory failure he may fit from noninvasive ventilation  Smoking cessation  If he remains in the hospital continue current steroid and antibiotics.   If he is transition to skilled nursing would recommend Ceftin for 1 more day as well as prednisone 40 mg a day for 3 days and decrease by 10 mg every 3 days until completed      Electronically signed by ERICA Sandoval CNP on 01/09/23     This progress note was completed using a voice transcription system. Every effort was made to ensure accuracy. However, inadvertent computerized transcription errors may be present.     [address] NWO Pulmonary, Critical Care & Sleep

## 2023-01-09 NOTE — PLAN OF CARE
Problem: Discharge Planning  Goal: Discharge to home or other facility with appropriate resources  1/9/2023 0406 by Jean Robles RN  Outcome: Progressing     Problem: Safety - Adult  Goal: Free from fall injury  1/9/2023 0406 by Jean Robles RN  Outcome: Progressing     Problem: Skin/Tissue Integrity  Goal: Absence of new skin breakdown  Description: 1. Monitor for areas of redness and/or skin breakdown  2. Assess vascular access sites hourly  3. Every 4-6 hours minimum:  Change oxygen saturation probe site  4. Every 4-6 hours:  If on nasal continuous positive airway pressure, respiratory therapy assess nares and determine need for appliance change or resting period.   1/9/2023 0406 by Jean Robles RN  Outcome: Progressing     Problem: ABCDS Injury Assessment  Goal: Absence of physical injury  1/9/2023 0406 by Jean Robles RN  Outcome: Progressing

## 2023-01-09 NOTE — PROGRESS NOTES
Physician Progress Note      PATIENT:               Jayden Devine  CSN #:                  [id number]  :                       1965  ADMIT DATE:       1/3/2023 5:10 PM  DISCH DATE:  RESPONDING  PROVIDER #:        Paddy Tinajero MD          QUERY TEXT:    Patient admitted with acute on chronic respiratory failure secondary to COPD   exacerbation. Pt noted to be tachypneic, tachycardic, and hypotensive with   elevated procalcitonin, CRP, and bandemia. Also noted treatment with Maxipime   for sepsis of unknown etiology. If possible, please document in progress notes   and discharge summary the source of sepsis:    The medical record reflects the following:  Risk Factors: PMH of severe COPD, nicotine dependence, and recent COVID 19   infection. Clinical Indicators: CXR 1/3: Negative chest radiograph. RR max 31. HR max   117. BP as low as 99/47. Procalcitonin 0.14 (1/3). .4 (1/3). WBCs/Bands: 4.6/12 (1/3), 3.7/44 (), 4.0/18 (), 5.1/1 (). Per Q   Response 1/3: Patient is testing positive for Covid without an active Covid-19   infection. Treatment: IVFs at 75 ml/hr. Zithromax (COPD exacerbation).  Maxipime IV   (Sepsis of unknown etiology)  Options provided:  -- Respond - Create new note now  -- Disagree - Not applicable / Not valid  -- Disagree - Clinically unable to determine / Unknown  -- Refer to Clinical Documentation Reviewer    PROVIDER RESPONSE TEXT:    Admitted with Hypoxic Respiratory Failure ,has elevated wbc ,recent   hospitalization , was treated with Maxipime to cover sepsis    Query created by: Bree Zafar on 2023 8:05 AM      Electronically signed by:  Paddy Tinajero MD 2023 3:24 PM

## 2023-01-09 NOTE — PLAN OF CARE
Problem: Discharge Planning  Goal: Discharge to home or other facility with appropriate resources  1/9/2023 1758 by Mickie Dickson RN  Outcome: Progressing     Problem: Safety - Adult  Goal: Free from fall injury  1/9/2023 1758 by Mickie Dickson RN  Outcome: Progressing     Problem: Skin/Tissue Integrity  Goal: Absence of new skin breakdown  1/9/2023 1758 by Mickie Dickson RN  Outcome: Progressing     Problem: ABCDS Injury Assessment  Goal: Absence of physical injury  1/9/2023 1758 by Mickie Dickson RN  Outcome: Progressing

## 2023-01-09 NOTE — PROGRESS NOTES
BRONCHOSPASM/BRONCHOCONSTRICTION     [x]         IMPROVE AERATION/BREATH SOUNDS  [x]   ADMINISTER BRONCHODILATOR THERAPY AS APPROPRIATE  [x]   ASSESS BREATH SOUNDS  []   IMPLEMENT AEROSOL/MDI PROTOCOL  [x]   PATIENT EDUCATION AS NEEDED     PROVIDE ADEQUATE OXYGENATION WITH ACCEPTABLE SP02/ABG'S    [x]  IDENTIFY APPROPRIATE OXYGEN THERAPY  [x]   MONITOR SP02/ABG'S AS NEEDED   [x]   PATIENT EDUCATION AS NEEDED     Stable on 5L (baseline). Diminished throughout.

## 2023-01-10 PROCEDURE — 2700000000 HC OXYGEN THERAPY PER DAY

## 2023-01-10 PROCEDURE — 99233 SBSQ HOSP IP/OBS HIGH 50: CPT | Performed by: INTERNAL MEDICINE

## 2023-01-10 PROCEDURE — 94640 AIRWAY INHALATION TREATMENT: CPT

## 2023-01-10 PROCEDURE — 6360000002 HC RX W HCPCS: Performed by: INTERNAL MEDICINE

## 2023-01-10 PROCEDURE — 97530 THERAPEUTIC ACTIVITIES: CPT

## 2023-01-10 PROCEDURE — 94660 CPAP INITIATION&MGMT: CPT

## 2023-01-10 PROCEDURE — 97116 GAIT TRAINING THERAPY: CPT

## 2023-01-10 PROCEDURE — 6370000000 HC RX 637 (ALT 250 FOR IP): Performed by: INTERNAL MEDICINE

## 2023-01-10 PROCEDURE — 2580000003 HC RX 258: Performed by: INTERNAL MEDICINE

## 2023-01-10 PROCEDURE — 6370000000 HC RX 637 (ALT 250 FOR IP): Performed by: NURSE PRACTITIONER

## 2023-01-10 PROCEDURE — 2060000000 HC ICU INTERMEDIATE R&B

## 2023-01-10 PROCEDURE — 94761 N-INVAS EAR/PLS OXIMETRY MLT: CPT

## 2023-01-10 RX ORDER — FLUTICASONE PROPIONATE 50 MCG
1 SPRAY, SUSPENSION (ML) NASAL DAILY
Status: DISCONTINUED | OUTPATIENT
Start: 2023-01-10 | End: 2023-01-19 | Stop reason: HOSPADM

## 2023-01-10 RX ADMIN — ENOXAPARIN SODIUM 40 MG: 100 INJECTION SUBCUTANEOUS at 08:04

## 2023-01-10 RX ADMIN — CEFEPIME 2000 MG: 2 INJECTION, POWDER, FOR SOLUTION INTRAVENOUS at 23:28

## 2023-01-10 RX ADMIN — METHYLPREDNISOLONE SODIUM SUCCINATE 40 MG: 40 INJECTION, POWDER, FOR SOLUTION INTRAMUSCULAR; INTRAVENOUS at 11:25

## 2023-01-10 RX ADMIN — METHYLPREDNISOLONE SODIUM SUCCINATE 40 MG: 40 INJECTION, POWDER, FOR SOLUTION INTRAMUSCULAR; INTRAVENOUS at 23:25

## 2023-01-10 RX ADMIN — IPRATROPIUM BROMIDE AND ALBUTEROL SULFATE 1 AMPULE: 2.5; .5 SOLUTION RESPIRATORY (INHALATION) at 11:07

## 2023-01-10 RX ADMIN — IPRATROPIUM BROMIDE AND ALBUTEROL SULFATE 1 AMPULE: 2.5; .5 SOLUTION RESPIRATORY (INHALATION) at 03:09

## 2023-01-10 RX ADMIN — FOLIC ACID 1 MG: 1 TABLET ORAL at 08:04

## 2023-01-10 RX ADMIN — AMLODIPINE BESYLATE 10 MG: 10 TABLET ORAL at 08:04

## 2023-01-10 RX ADMIN — BUDESONIDE AND FORMOTEROL FUMARATE DIHYDRATE 2 PUFF: 160; 4.5 AEROSOL RESPIRATORY (INHALATION) at 11:17

## 2023-01-10 RX ADMIN — METHYLPREDNISOLONE SODIUM SUCCINATE 40 MG: 40 INJECTION, POWDER, FOR SOLUTION INTRAMUSCULAR; INTRAVENOUS at 01:07

## 2023-01-10 RX ADMIN — IPRATROPIUM BROMIDE AND ALBUTEROL SULFATE 1 AMPULE: 2.5; .5 SOLUTION RESPIRATORY (INHALATION) at 08:17

## 2023-01-10 RX ADMIN — CEFEPIME 2000 MG: 2 INJECTION, POWDER, FOR SOLUTION INTRAVENOUS at 13:41

## 2023-01-10 RX ADMIN — THIAMINE HCL TAB 100 MG 100 MG: 100 TAB at 08:04

## 2023-01-10 RX ADMIN — FLUTICASONE PROPIONATE 1 SPRAY: 50 SPRAY, METERED NASAL at 17:10

## 2023-01-10 RX ADMIN — IPRATROPIUM BROMIDE AND ALBUTEROL SULFATE 1 AMPULE: 2.5; .5 SOLUTION RESPIRATORY (INHALATION) at 15:24

## 2023-01-10 RX ADMIN — Medication 60 MG: at 08:04

## 2023-01-10 RX ADMIN — GUAIFENESIN 1200 MG: 600 TABLET, EXTENDED RELEASE ORAL at 08:04

## 2023-01-10 RX ADMIN — CEFEPIME 2000 MG: 2 INJECTION, POWDER, FOR SOLUTION INTRAVENOUS at 06:35

## 2023-01-10 RX ADMIN — IPRATROPIUM BROMIDE AND ALBUTEROL SULFATE 1 AMPULE: 2.5; .5 SOLUTION RESPIRATORY (INHALATION) at 23:09

## 2023-01-10 RX ADMIN — Medication 60 MG: at 20:47

## 2023-01-10 RX ADMIN — GUAIFENESIN 1200 MG: 600 TABLET, EXTENDED RELEASE ORAL at 20:47

## 2023-01-10 NOTE — PROGRESS NOTES
[address]    INPATIENT OCCUPATIONAL THERAPY  PROGRESS NOTE  Date: 1/10/2023  Patient Name: Linus Funes       Room: 8459/0307-89  MRN: [id number]    : 1965  (58 y.o.)  Gender: male   Referring Practitioner: Dr. Carissa Turner  Diagnosis: COPD exacerbation, acute on chronic respiratory failure with hypoxia and hypercapnia, pneumonia due to COVID-19    Restrictions/Precautions  Restrictions/Precautions  Restrictions/Precautions: Contact Precautions;General Precautions (5L O2, needed placed back on Bipap 2* SpO2 86% with EOB activity, anxious)  Required Braces or Orthoses?: No         Subjective  Subjective  Subjective: \" I just need to get better so I can keep up with my grandkids\"  Comments: Pts Spo2 reading between 88-90 after mobility while seated EOB. Stats not increased with time, RN notifed and with pt upon writer exit.     Objective  Orientation  Overall Orientation Status: Within Functional Limits  Cognition  Overall Cognitive Status: WFL  ADL  Feeding: Setup;Supervision  Grooming: Stand by assistance  UE Bathing: Stand by assistance  LE Bathing: Stand by assistance  UE Dressing: Stand by assistance  LE Dressing: Stand by assistance  Toileting: Stand by assistance  Additional Comments: Above levels based on skilled clinical observation only this date based on skilled clincial observation only this date         Balance  Balance  Sitting Balance: Supervision  Standing Balance: Stand by assistance  Standing Balance  Time: 3 min  Activity: functional mobility to door and back to EOB  Comment: Pt fatiguues quickly and requires seated rest break    Transfers/Mobility  Bed mobility  Supine to Sit: Stand by assistance  Sit to Supine: Stand by assistance  Scooting: Stand by assistance  Transfers  Sit to stand: Stand by assistance  Stand to sit: Stand by assistance  Transfer Comments: with RW    Functional Mobility  Functional - Mobility Device: Rolling Walker  Activity: Other (to and from [facility])  Assist Level: Contact guard assistance    Functional Activities  OT Exercises  Exercise Treatment: Pt completed minimal exercises seated EOB. Unable to complete  due to low Spo2. Patient Education  Patient Education  Education Given To: Patient  Education Provided: Role of Therapy, Plan of Care, Energy Conservation  Education Method: Verbal  Barriers to Learning: None  Education Outcome: Verbalized understanding, Continued education needed      Goals  Patient Goals   Patient goals : None voiced  Short Term Goals  Time Frame for Short Term Goals: By Discharge  Short Term Goal 1: Pt will actively participate in self-care routine at EOB or up to chair while maintaining SpO2 90%+ and taking <3 rest breaks. Short Term Goal 2: Pt will complete toilet transfer and toileting with SUP and Good safety using least restrictive device and maintaining SpO2 90%+. Short Term Goal 3: Pt will V/D at least EC/WS techniques that he can utilize during daily routines. Short Term Goal 4: Pt will tolerate standing for 3-4 minutes with 0-1 UE support and SUP while participating in a functional task and maintaining SpO2 90%+. Short Term Goal 5: Pt will actively participate in 15-20 minutes of therapeutic exercise/activity to promote increased independence and safety with self-care and mobility. Occupational Therapy Plan  Times Per Week: 5  Times Per Day:  Once a day      Assessment  Activity Tolerance  Activity Tolerance: Patient limited by fatigue, Treatment limited secondary to medical complications (free text)  Activity Tolerance Comments: SpO2 decreased to 86% with EOB activity, no improvement with pursed lip breathing, RN placed pt back on Bipap with SpO2 recovery to 94%  Assessment  Performance deficits / Impairments: Decreased functional mobility , Decreased ADL status, Decreased endurance, Decreased high-level IADLs  Treatment Diagnosis: Impaired self-care status  Prognosis: Fair  Decision Making: Medium Complexity  Discharge Recommendations: Patient would benefit from continued therapy after discharge  OT Equipment Recommendations  Other: TBD  Safety Devices  Type of Devices: Call light within reach, Left in bed      AM-PAC Daily Activities Inpatient  AM-PAC Daily Activity Inpatient   How much help for putting on and taking off regular lower body clothing?: A Little  How much help for Bathing?: A Little  How much help for Toileting?: A Little  How much help for putting on and taking off regular upper body clothing?: A Little  How much help for taking care of personal grooming?: A Little  How much help for eating meals?: A Little  AM-PAC Inpatient Daily Activity Raw Score: 18  AM-PAC Inpatient ADL T-Scale Score : 38.66  ADL Inpatient CMS 0-100% Score: 46.65  ADL Inpatient CMS G-Code Modifier : CK    OT Minutes  OT Individual Minutes  Time In: 1316  Time Out: 1334  Minutes: 18      BENEDICTO Sheth

## 2023-01-10 NOTE — DISCHARGE INSTR - COC
Continuity of Care Form    Patient Name: Sebastien    :  1965  MRN:  [id number]    Admit date:  1/3/2023  Discharge date:  23    Code Status Order: Full Code   Advance Directives:     Admitting Physician:  Ezio Justice MD  PCP: Zoë Rosales MD    Discharging Nurse: [facility] Unit/Room#: 2054  Discharging Unit Phone Number: [phone]    Emergency Contact:   Extended Emergency Contact Information  Primary Emergency Contact: [place]  Home Phone: [phone]  Relation: None   needed? No    Past Surgical History:  History reviewed. No pertinent surgical history. Immunization History: There is no immunization history on file for this patient.     Active Problems:  Patient Active Problem List   Diagnosis Code    Chronic respiratory failure with hypoxia (HCC) J96.11    Alcoholism ([facility] [address] F10.20    Abuse of smoked substance ([facility] [address] F18.10    Chronic obstructive pulmonary disease ([facility] [address] J44.9    Benign essential HTN I10    Recurrent major depressive disorder, in partial remission ([facility] [address] F33.41    Hyponatremia E87.1    Hyperkalemia E87.5    Vitamin D deficiency E55.9    Renal mass, left N28.89    COPD exacerbation ([facility] [address] J44.1    Acute respiratory failure (HCC) J96.00       Isolation/Infection:   Isolation            No Isolation          Patient Infection Status       Infection Onset Added Last Indicated Last Indicated By Review Planned Expiration Resolved Resolved By    None active    Resolved    COVID-19 23 COVID-19 & Influenza Combo   23 Ana Luisa Patino RN    Pt was + in early dec no longer requires isolation per Dr. Aurelio Uriarte    COVID-19 (Rule Out) 23 COVID-19 & Influenza Combo (Ordered)   23 Rule-Out Test Resulted    COVID-19 (Rule Out) 22 COVID-19 & Influenza Combo (Ordered)   22 Rule-Out Test Resulted    COVID-19 (Rule Out) 22 COVID-19 & Influenza Combo (Ordered)   06/07/22 Rule-Out Test Resulted            Nurse Assessment:  Last Vital Signs: /76   Pulse 76   Temp 97.8 °F (36.6 °C) (Oral)   Resp 20   Ht 5' 7.72\" (1.72 m)   Wt 146 lb 9.7 oz (66.5 kg)   SpO2 100%   BMI 22.48 kg/m²     Last documented pain score (0-10 scale): Pain Level: 0  Last Weight:   Wt Readings from Last 1 Encounters:   01/10/23 146 lb 9.7 oz (66.5 kg)     Mental Status:  oriented and alert    IV Access:  - None    Nursing Mobility/ADLs:  Walking   Independent  Transfer  Independent  Bathing  Independent  Dressing  Independent  Toileting  Independent  Feeding  Independent  Med Admin  Independent  Med Delivery   whole    Wound Care Documentation and Therapy:        Elimination:  Continence: Bowel: Yes  Bladder: Yes  Urinary Catheter: None   Colostomy/Ileostomy/Ileal Conduit: No       Date of Last BM: ***    Intake/Output Summary (Last 24 hours) at 1/10/2023 0817  Last data filed at 1/10/2023 7891  Gross per 24 hour   Intake 540 ml   Output 500 ml   Net 40 ml     I/O last 3 completed shifts: In: 740 [P.O.:740]  Out: 1025 [Urine:1025]    Safety Concerns: At Risk for Falls    Impairments/Disabilities:      None    Nutrition Therapy:  Current Nutrition Therapy:   - Oral Diet:  Carb Control 4 carbs/meal (1800kcals/day)    Routes of Feeding: Oral  Liquids: No Restrictions  Daily Fluid Restriction: no  Last Modified Barium Swallow with Video (Video Swallowing Test): not done    Treatments at the Time of Hospital Discharge:   Respiratory Treatments: duoneb q4h  Oxygen Therapy:  is on oxygen at 5 L/min per nasal cannula. Ventilator:    - BiPAP   IPAP: 16 cmH20, CPAP/EPAP: 6 cmH2O only when sleeping    Rehab Therapies: Physical Therapy and Occupational Therapy  Weight Bearing Status/Restrictions: No weight bearing restrictions  Other Medical Equipment (for information only, NOT a DME order):  n/a  Other Treatments: Skilled Nursing assessment and monitoring.  Medication education and monitoring per protocol. Patient's personal belongings (please select all that are sent with patient):  None    RN SIGNATURE:  Electronically signed by Hien Garza RN on 1/13/23 at 9:22 AM EST    CASE MANAGEMENT/SOCIAL WORK SECTION    Inpatient Status Date: 1/3/23    Readmission Risk Assessment Score:  Readmission Risk              Risk of Unplanned Readmission:  24           Discharging to Facility/ Agency   [address] [place] [address]  Phone [phone]  Fax [phone]  Evening fax [phone]      Dialysis Facility (if applicable)   Name:  Address:  Dialysis Schedule:  Phone:  Fax:    / signature: Electronically signed by Nikos Avery RN on 1/10/23 at 8:18 AM EST    PHYSICIAN SECTION    Prognosis: Fair    Condition at Discharge: Stable    Rehab Potential (if transferring to Rehab): Fair    Recommended Labs or Other Treatments After Discharge:     Physician Certification: I certify the above information and transfer of Shea Case  is necessary for the continuing treatment of the diagnosis listed and that he requires [facility] for greater 30 days.      Update Admission H&P: No change in H&P    Electronically signed by Stevie Bell MD on 1/18/2023 at 2:00 PM

## 2023-01-10 NOTE — PROGRESS NOTES
Primitivo Hurd MD/Mukul Smith MD/ Dr. Demarco Maria AGACNP-BC, NP-C      Tito Ocampobam APRN NP-C          [id number] [facility] APRN - NP-C                                      Critical Care / Pulmonary Progress Note    Patient - Oscar Dorman   Age - 61 y.o.   - 1965  MRN - [id number]  Acct # - [de-identified]  Date of Admission - 1/3/2023  5:10 PM    Consulting Service/Physician:       Primary Care Physician: Nyoka Hodgkins, MD    SUBJECTIVE:     Chief Complaint:   Chief Complaint   Patient presents with    Respiratory Distress     Subjective:    Patient does go on and off BiPAP. He is on his baseline 5 L when off. He overall is starting to feel little bit better. He was able to get up and walk to the bathroom and back. Still dyspneic but starting to improve he feels like    VITALS  /76   Pulse 82   Temp 97.8 °F (36.6 °C) (Oral)   Resp 22   Ht 5' 7.72\" (1.72 m)   Wt 146 lb 9.7 oz (66.5 kg)   SpO2 97%   BMI 22.48 kg/m²   Wt Readings from Last 3 Encounters:   01/10/23 146 lb 9.7 oz (66.5 kg)   22 154 lb 1.6 oz (69.9 kg)   22 140 lb 6.4 oz (63.7 kg)     I/O (24 Hours)    Intake/Output Summary (Last 24 hours) at 1/10/2023 1159  Last data filed at 1/10/2023 3434  Gross per 24 hour   Intake 300 ml   Output 500 ml   Net -200 ml     Ventilator:   Settings  FiO2 : 40 %  Insp Rise Time (%): 3 %  Exam:   Physical Exam   Constitutional:  Oriented to person, place, and time. Appears well-developed and well-nourished. HENT: Unremarkable  Head: Normocephalic and atraumatic. Eyes: EOM are normal. Pupils are equal, round, and reactive to light. Neck: Neck supple. Cardiovascular:  Regular rate and rhythm. Normal heart tones. No JVD. Pulmonary/Chest: Effort normal and breath sounds diminished throughout  Abdominal: Soft. Bowel sounds are normal. There is no tenderness.    Musculoskeletal: Normal range of motion. She exhibits no edema or tenderness.   Neurological:patient is alert and oriented to person, place, and time.   Skin: Skin is warm and dry. No rash noted.   Extremities: No edema or discoloration  Infusions:      sodium chloride       Meds:     Current Facility-Administered Medications:     perflutren lipid microspheres (DEFINITY) injection 1.5 mL, 1.5 mL, IntraVENous, ONCE PRN, Conrado Alexis MD    phenol 1.4 % mouth spray 1 spray, 1 spray, Mouth/Throat, Q2H PRN, Mukul Paul MD    methylPREDNISolone sodium (SOLU-MEDROL) injection 40 mg, 40 mg, IntraVENous, Q12H, Mukul Paul MD, 40 mg at 01/10/23 1125    guaiFENesin (MUCINEX) extended release tablet 1,200 mg, 1,200 mg, Oral, BID, Mukul Paul MD, 1,200 mg at 01/10/23 0804    dextromethorphan (DELSYM) 30 MG/5ML extended release liquid 60 mg, 60 mg, Oral, 2 times per day, Mukul Paul MD, 60 mg at 01/10/23 0804    ipratropium-albuterol (DUONEB) nebulizer solution 1 ampule, 1 ampule, Inhalation, Q4H, Mukul Paul MD, 1 ampule at 01/10/23 1107    ipratropium-albuterol (DUONEB) nebulizer solution 1 ampule, 1 ampule, Inhalation, Q4H PRN, Mukul Paul MD    enoxaparin (LOVENOX) injection 40 mg, 40 mg, SubCUTAneous, Daily, Candido Garcia MD, 40 mg at 01/10/23 0804    [COMPLETED] cefepime (MAXIPIME) 2,000 mg in sodium chloride 0.9 % 50 mL IVPB mini-bag, 2,000 mg, IntraVENous, Once, Stopped at 01/03/23 2003 **FOLLOWED BY** cefepime (MAXIPIME) 2,000 mg in sodium chloride 0.9 % 50 mL IVPB mini-bag, 2,000 mg, IntraVENous, Q8H, Candido Garcia MD, Stopped at 01/10/23 1035    amLODIPine (NORVASC) tablet 10 mg, 10 mg, Oral, Daily, Jennifer Neal, APRN - NP, 10 mg at 01/10/23 0804    folic acid (FOLVITE) tablet 1 mg, 1 mg, Oral, Daily, ERICA Ordoñez - NP, 1 mg at 01/10/23 0804    budesonide-formoterol (SYMBICORT) 160-4.5 MCG/ACT inhaler 2 puff, 2 puff, Inhalation, Daily, Jennifer Neal APRN - NP, 2 puff at 01/10/23  1117    thiamine mononitrate tablet 100 mg, 100 mg, Oral, Daily, Kiana Caller, APRN - NP, 100 mg at 01/10/23 0804    sodium chloride flush 0.9 % injection 5-40 mL, 5-40 mL, IntraVENous, 2 times per day, Kiana Caller, APRN - NP    sodium chloride flush 0.9 % injection 5-40 mL, 5-40 mL, IntraVENous, PRN, Jennifer Neal, APRN - NP    0.9 % sodium chloride infusion, , IntraVENous, PRN, Kiana Caller, APRN - NP    ondansetron (ZOFRAN-ODT) disintegrating tablet 4 mg, 4 mg, Oral, Q8H PRN **OR** ondansetron (ZOFRAN) injection 4 mg, 4 mg, IntraVENous, Q6H PRN, Kiana Caller, APRN - NP    polyethylene glycol (GLYCOLAX) packet 17 g, 17 g, Oral, Daily PRN, Kiana Caller, APRN - NP    acetaminophen (TYLENOL) tablet 650 mg, 650 mg, Oral, Q6H PRN **OR** acetaminophen (TYLENOL) suppository 650 mg, 650 mg, Rectal, Q6H PRN, Kiana Caller, APRN - NP    Lab Results:     [unfilled]  Lab Results   Component Value Date    WBC 6.4 01/09/2023    HGB 10.8 (L) 01/09/2023    HCT 32.5 (L) 01/09/2023    MCV 93.2 01/09/2023     01/09/2023     Lab Results   Component Value Date    CALCIUM 8.4 (L) 01/09/2023     01/09/2023    K 4.7 01/09/2023    CO2 44 (HH) 01/09/2023    CL 88 (L) 01/09/2023    BUN 17 01/09/2023    CREATININE 0.41 (L) 01/09/2023     No components found for: ABGSAMPLETYP, ABGBODYTEMP, ABGPHCORRFOR, [id number], ABGPHCORRFOOR, ABGPH, ABGPCO2, ABGPO2, ABGBASEEXCES, ABGBASEDEFIC, ABGHCO3, WCTW6BIB, ABGENDTIDALC, ABGALLENSTES, ABGSPO2, ABGSAMEPLESIT, [id number], ABGOXYGENSOU  No results found for: INR, PROTIME    Radiology:        ASSESSMENT:   Principal Problem:    COPD exacerbation ([address]  Resolved Problems:    * No resolved hospital problems.  *      Acute on chronic hypoxemic respiratory failure baseline 5L  Acute on chronic hypercapnic respiratory failure  COPD exacerbation  History of severe COPD; maintained on Spiriva, Symbicort Proventil- follows with Dr. Linh Sofia  Recent COVID-19 infection in December at [facility]  Tobacco abuse  Hypertension  PLAN:   He will complete cefepime tomorrow  Continue bronchodilators  Plans to switch to oral prednisone tomorrow-if patient is ready for skilled nursing facility no objection to transition to prednisone 40 mg daily and taper down by 10 mg every 3 days until completed  I did discuss with Coty Benavides with [facility] and unfortunately his insurance does not cover noninvasive ventilator with her service. I did discuss with  will attempt a different [employer] company. Patient would benefit from noninvasive ventilation due to chronic hypercapnic failure to avoid adverse outcomes as well as death. Electronically signed by ERICA Garcia CNP on 01/10/23     This progress note was completed using a voice transcription system. Every effort was made to ensure accuracy. However, inadvertent computerized transcription errors may be present.     [address] [facility] Care [facility]

## 2023-01-10 NOTE — CARE COORDINATION
ONGOING DISCHARGE PLAN:    Patient is alert and oriented x4. Spoke with patient regarding discharge plan and patient confirms that plan is still to go to SNF. Referrals made to [facility] (can accept but needs PT notes) and [facility]. Physical Therapy worked with patient today and they will put notes in . [facility] notified of this . Pt will need trilogy as outpt [facility] is out of network for patients insurance. Info sent to [facility] to follow. She will follow for [facility]. IV cefepime IV solumedrol 40 mg Q12, duoneb aerosols. Following for home O2 currently on 5lpm NC sats 90%. Will continue to follow for additional discharge needs.     Electronically signed by Garcia Benitez RN on 1/10/2023 at 1:28 PM

## 2023-01-10 NOTE — PLAN OF CARE
Problem: Discharge Planning  Goal: Discharge to home or other facility with appropriate resources  1/10/2023 0230 by James Mar RN  Outcome: Progressing     Problem: Safety - Adult  Goal: Free from fall injury  1/10/2023 0230 by James Mar RN  Outcome: Progressing     Problem: Skin/Tissue Integrity  Goal: Absence of new skin breakdown  Description: 1. Monitor for areas of redness and/or skin breakdown  2. Assess vascular access sites hourly  3. Every 4-6 hours minimum:  Change oxygen saturation probe site  4. Every 4-6 hours:  If on nasal continuous positive airway pressure, respiratory therapy assess nares and determine need for appliance change or resting period.   1/10/2023 0230 by James Mar RN  Outcome: Progressing     Problem: ABCDS Injury Assessment  Goal: Absence of physical injury  1/10/2023 0230 by James Mar RN  Outcome: Progressing

## 2023-01-10 NOTE — PROGRESS NOTES
Nutrition Assessment     Type and Reason for Visit: RD Nutrition Re-Screen/LOS    Nutrition Recommendations/Plan:   Will continue to provide 4 carbohydrate choices per tray     Nutrition Assessment:  Pt admitted due to COPD Exacerbation. Po intake has consistently been more than 75%. Elevated Glu noted. Pt is on appropriate diet. Nutrition Related Findings:     Wound Type: None    Current Nutrition Therapies:    ADULT DIET;  Regular; 4 carb choices (60 gm/meal)    Anthropometric Measures:  Height: 5' 7.72\" (172 cm)  Current Body Wt: 146 lb (66.2 kg)   BMI: 22.4    Nutrition Diagnosis:   No nutrition diagnosis at this time     Nutrition Interventions:   Food and/or Nutrient Delivery: Continue Current Diet                      Nutrition Monitoring and Evaluation:      Food/Nutrient Intake Outcomes: Food and Nutrient Intake       Discharge Planning:    Continue current diet     Jasen Granda, [address],   Contact: [phone]

## 2023-01-10 NOTE — PROGRESS NOTES
Physical Therapy  [address]    Date: 1/10/23  Patient Name: Ravi Cisneros       Room: 3597/8471-69  MRN: [id number]   Account: [de-identified]   : 1965  (58 y.o.) Gender: male     Referring Practitioner: Dr. Ping Sales  Diagnosis: COPD exacerbation, acute on chronic respiratory failure with hypoxia and hypercapnia, pneumonia due to COVID-19  Past Medical History:  has a past medical history of COPD (chronic obstructive pulmonary disease) ([address] and Hypertension. Past Surgical History:   has no past surgical history on file.        Overall Orientation Status: Within Functional Limits  Restrictions/Precautions  Restrictions/Precautions: Contact Precautions;General Precautions (5L O2, needed placed back on Bipap 2* SpO2 86% with EOB activity, anxious)  Required Braces or Orthoses?: No    Subjective: Pt sitting EOB upon arrival, agreeable to therapy \\  Comments: [facility] approved therapy; co-treat with LARS Howell       Pain Assessment: None - Denies Pain     Oxygen Therapy  SpO2: (!) 89 %  Pulse Oximeter Device Mode: Continuous  Pulse Oximeter Device Location: Finger  O2 Device: Nasal cannula  O2 Flow Rate (L/min): 5 L/min    Bed Mobility  Rolling: Stand by assistance  Supine to Sit: Stand by assistance  Sit to Supine: Unable to assess  Scooting: Stand by assistance  Bed mobility  Scooting: Stand by assistance    Transfers:  Sit to Stand: Stand by assistance  Stand to Sit: Stand by assistance    EXERCISES    Other exercises?: Yes  Other exercises 1: STS x1 from bed  Other exercises 2: pt ambulated with RW and CGA x1 ~15ft  Other exercises 3: seated B LE ankle pumps x15 reps  Other exercises 4: seated B UE exercises facilitated by SOUSA  Other Activities  Comment: Pt required increased time to perform all activities d/t desats with activity        Activity Tolerance: Patient limited by endurance  PT Equipment Recommendations  Equipment Needed: No    Current Treatment Recommendations: Functional mobility training, Gait training, Therapeutic activities, Endurance training    Conditions Requiring Skilled Therapeutic Intervention  History: COPD; Covid  Discharge Recommendations:  Other (comment) (may benefit from Pulmonary Rehab)    AM-[facility] Mobility Inpatient   How much difficulty turning over in bed?: A Little  How much difficulty sitting down on / standing up from a chair with arms?: A Little  How much difficulty moving from lying on back to sitting on side of bed?: A Little  How much help from another person moving to and from a bed to a chair?: A Little  How much help from another person needed to walk in hospital room?: A Little  How much help from another person for climbing 3-5 steps with a railing?: Total  AM-PAC Inpatient Mobility Raw Score : 16  AM-PAC Inpatient T-Scale Score : 40.78  Mobility Inpatient CMS 0-100% Score: 54.16  Mobility Inpatient CMS G-Code Modifier : CK      Goals  Short Term Goals  Time Frame for Short Term Goals: 4-5 days  Short Term Goal 1: mod-I bed mobility  Short Term Goal 2: mod-I transfers  Short Term Goal 3: ambulate with SBA and O2 x 40-50ft  Short Term Goal 4: pt able to tolerate 20-25min og therapeutic activity/exercises without BiPap       01/10/23 1402   PT Individual Minutes   Time In 1316   Time Out 1334   Minutes 18       Electronically signed by José Manuel Miranda PTA on 1/10/23 at 2:02 PM EST

## 2023-01-11 LAB
ABSOLUTE BANDS #: 0.43 K/UL (ref 0–1)
ABSOLUTE EOS #: 0 K/UL (ref 0–0.4)
ABSOLUTE LYMPH #: 0.17 K/UL (ref 1–4.8)
ABSOLUTE MONO #: 0.43 K/UL (ref 0.1–1.3)
ANION GAP SERPL CALCULATED.3IONS-SCNC: 1 MMOL/L (ref 9–17)
BANDS: 5 % (ref 0–10)
BASOPHILS # BLD: 0 % (ref 0–2)
BASOPHILS ABSOLUTE: 0 K/UL (ref 0–0.2)
BUN BLDV-MCNC: 19 MG/DL (ref 6–20)
CALCIUM SERPL-MCNC: 8.6 MG/DL (ref 8.6–10.4)
CHLORIDE BLD-SCNC: 88 MMOL/L (ref 98–107)
CO2: 45 MMOL/L (ref 20–31)
CREAT SERPL-MCNC: 0.4 MG/DL (ref 0.7–1.2)
EOSINOPHILS RELATIVE PERCENT: 0 % (ref 0–4)
GFR SERPL CREATININE-BSD FRML MDRD: >60 ML/MIN/1.73M2
GLUCOSE BLD-MCNC: 126 MG/DL (ref 75–110)
GLUCOSE BLD-MCNC: 221 MG/DL (ref 70–99)
HCT VFR BLD CALC: 32.5 % (ref 41–53)
HEMOGLOBIN: 10.7 G/DL (ref 13.5–17.5)
LYMPHOCYTES # BLD: 2 % (ref 24–44)
MCH RBC QN AUTO: 31 PG (ref 26–34)
MCHC RBC AUTO-ENTMCNC: 33 G/DL (ref 31–37)
MCV RBC AUTO: 93.8 FL (ref 80–100)
MONOCYTES # BLD: 5 % (ref 1–7)
MORPHOLOGY: NORMAL
PDW BLD-RTO: 13.1 % (ref 11.5–14.9)
PLATELET # BLD: 278 K/UL (ref 150–450)
PMV BLD AUTO: 6.2 FL (ref 6–12)
POTASSIUM SERPL-SCNC: 4.9 MMOL/L (ref 3.7–5.3)
RBC # BLD: 3.47 M/UL (ref 4.5–5.9)
SEG NEUTROPHILS: 88 % (ref 36–66)
SEGMENTED NEUTROPHILS ABSOLUTE COUNT: 7.47 K/UL (ref 1.3–9.1)
SODIUM BLD-SCNC: 134 MMOL/L (ref 135–144)
WBC # BLD: 8.5 K/UL (ref 3.5–11)

## 2023-01-11 PROCEDURE — 2700000000 HC OXYGEN THERAPY PER DAY

## 2023-01-11 PROCEDURE — 97116 GAIT TRAINING THERAPY: CPT

## 2023-01-11 PROCEDURE — 94761 N-INVAS EAR/PLS OXIMETRY MLT: CPT

## 2023-01-11 PROCEDURE — 99232 SBSQ HOSP IP/OBS MODERATE 35: CPT | Performed by: INTERNAL MEDICINE

## 2023-01-11 PROCEDURE — 94660 CPAP INITIATION&MGMT: CPT

## 2023-01-11 PROCEDURE — 6370000000 HC RX 637 (ALT 250 FOR IP): Performed by: NURSE PRACTITIONER

## 2023-01-11 PROCEDURE — 6360000002 HC RX W HCPCS: Performed by: INTERNAL MEDICINE

## 2023-01-11 PROCEDURE — 85025 COMPLETE CBC W/AUTO DIFF WBC: CPT

## 2023-01-11 PROCEDURE — 36415 COLL VENOUS BLD VENIPUNCTURE: CPT

## 2023-01-11 PROCEDURE — 6370000000 HC RX 637 (ALT 250 FOR IP): Performed by: INTERNAL MEDICINE

## 2023-01-11 PROCEDURE — 80048 BASIC METABOLIC PNL TOTAL CA: CPT

## 2023-01-11 PROCEDURE — 82947 ASSAY GLUCOSE BLOOD QUANT: CPT

## 2023-01-11 PROCEDURE — 1200000000 HC SEMI PRIVATE

## 2023-01-11 PROCEDURE — 94640 AIRWAY INHALATION TREATMENT: CPT

## 2023-01-11 RX ORDER — DEXTROMETHORPHAN POLISTIREX 30 MG/5ML
60 SUSPENSION ORAL EVERY 12 HOURS SCHEDULED
Qty: 200 ML | Refills: 0 | Status: SHIPPED | OUTPATIENT
Start: 2023-01-11 | End: 2023-01-21

## 2023-01-11 RX ORDER — PREDNISONE 10 MG/1
TABLET ORAL
Qty: 18 TABLET | Refills: 0 | Status: SHIPPED | OUTPATIENT
Start: 2023-01-11 | End: 2023-01-21

## 2023-01-11 RX ORDER — GUAIFENESIN 600 MG/1
1200 TABLET, EXTENDED RELEASE ORAL 2 TIMES DAILY
Qty: 30 TABLET | Refills: 0 | Status: SHIPPED | OUTPATIENT
Start: 2023-01-11

## 2023-01-11 RX ORDER — PREDNISONE 20 MG/1
40 TABLET ORAL DAILY
Status: DISCONTINUED | OUTPATIENT
Start: 2023-01-11 | End: 2023-01-16

## 2023-01-11 RX ADMIN — FLUTICASONE PROPIONATE 1 SPRAY: 50 SPRAY, METERED NASAL at 10:38

## 2023-01-11 RX ADMIN — Medication 60 MG: at 10:35

## 2023-01-11 RX ADMIN — THIAMINE HCL TAB 100 MG 100 MG: 100 TAB at 10:34

## 2023-01-11 RX ADMIN — FOLIC ACID 1 MG: 1 TABLET ORAL at 10:34

## 2023-01-11 RX ADMIN — Medication 60 MG: at 22:25

## 2023-01-11 RX ADMIN — BUDESONIDE AND FORMOTEROL FUMARATE DIHYDRATE 2 PUFF: 160; 4.5 AEROSOL RESPIRATORY (INHALATION) at 08:36

## 2023-01-11 RX ADMIN — GUAIFENESIN 1200 MG: 600 TABLET, EXTENDED RELEASE ORAL at 10:34

## 2023-01-11 RX ADMIN — ENOXAPARIN SODIUM 40 MG: 100 INJECTION SUBCUTANEOUS at 10:34

## 2023-01-11 RX ADMIN — GUAIFENESIN 1200 MG: 600 TABLET, EXTENDED RELEASE ORAL at 22:25

## 2023-01-11 RX ADMIN — METHYLPREDNISOLONE SODIUM SUCCINATE 40 MG: 40 INJECTION, POWDER, FOR SOLUTION INTRAMUSCULAR; INTRAVENOUS at 10:44

## 2023-01-11 RX ADMIN — IPRATROPIUM BROMIDE AND ALBUTEROL SULFATE 1 AMPULE: 2.5; .5 SOLUTION RESPIRATORY (INHALATION) at 20:44

## 2023-01-11 RX ADMIN — IPRATROPIUM BROMIDE AND ALBUTEROL SULFATE 1 AMPULE: 2.5; .5 SOLUTION RESPIRATORY (INHALATION) at 08:36

## 2023-01-11 RX ADMIN — AMLODIPINE BESYLATE 10 MG: 10 TABLET ORAL at 10:34

## 2023-01-11 RX ADMIN — PREDNISONE 40 MG: 20 TABLET ORAL at 17:04

## 2023-01-11 RX ADMIN — IPRATROPIUM BROMIDE AND ALBUTEROL SULFATE 1 AMPULE: 2.5; .5 SOLUTION RESPIRATORY (INHALATION) at 15:31

## 2023-01-11 NOTE — PROGRESS NOTES
DARRION Berkowitz 52    HISTORY AND PHYSICAL EXAMINATION            Date:   1/11/2023  Patient name:  Sadi Duenas  Date of admission:  1/3/2023  5:10 PM  MRN:   [id number]  Account:  [de-identified]  YOB: 1965  PCP:    Marjorie Waterman MD  Room:   2094/2094-01  Code Status:    Full Code    Chief Complaint:     Chief Complaint   Patient presents with    Respiratory Distress       History Obtained From:     patient, electronic medical record    History of Present Illness: The patient is a 61 y.o. Non- / non  male who presents with Respiratory Distress   and he is admitted to the hospital for the management of shortness of breath  Patient past medical history multiple medical problem which include hypertension, advanced COPD, chronic alcoholism, patient, was admitted with worsening shortness of breath, symptoms are going on for last few days before presentation  Patient apparently was not taking his medication  Was admitted at [facility] with [facility]  Patient denying complaints of chest pain  In emergency room COVID was positive, was cleared by pulmonary medicine team for droplet plus precautions  Patient in emergency room, was found to have pH of 7.2, PCO2 was too high to be recorded, remained on BiPAP overnight, repeat PCO2 is 91    1/5   Patient, clinically doing better  Did use BiPAP last night  On intermittent BiPAP  No new complaints      1/7  Patient got transferred out of ICU this morning  Complaining of worsening cough  Not able to get phlegm out  Will give  acapella  DC fluids      1/8  Had episode of worsening shortness of breath yesterday  Was placed on BiPAP  Also had 5 beat run of V. tach  Electrolytes were normal  Patient denied any chest pain or palpitations  Currently on BiPAP  States that he is feeling slightly better than yesterday  His vital signs been stable  Patient has been afebrile.       1/9  On BiPAP  States that he is feeling the same  Denies any chest pain  Has been afebrile  Echocardiogram pending. 1/10  Patient states is feeling okay  Continues to be on IV steroids  Finished antibiotic course  Using BiPAP as needed during the day and at night  Continues to await placement. Vital stable. 1/11  No overnight issues, patient continues to be dyspneic on minimal exertion which is likely his baseline now  Using BiPAP as needed during the day and at night  Denies any chest pain otherwise tolerating p.o. well  Past Medical History:     Past Medical History:   Diagnosis Date    COPD (chronic obstructive pulmonary disease) ([facility] [address]     Hypertension         Past Surgical History:     History reviewed. No pertinent surgical history. Medications Prior to Admission:     Prior to Admission medications    Medication Sig Start Date End Date Taking? Authorizing Provider   SYMBICORT 160-4.5 MCG/ACT AERO INHALE 2 PUFFS BY MOUTH ONCE DAILY 1/5/23   Ada Vera MD   traZODone (DESYREL) 50 MG tablet Take 1 tablet by mouth nightly 1/5/23   Ada Vera MD   Jenny Sinks 18 MCG inhalation capsule Inhale 1 puff by mouth once daily 1/5/23   Ada Vera MD   escitalopram (LEXAPRO) 10 MG tablet Take 1 tablet by mouth daily  Patient not taking: Reported on 1/3/2023 6/22/22   Néstor Zamudio MD   folic acid (FOLVITE) 1 MG tablet Take 1 tablet by mouth daily 6/22/22   Néstor Zamudio MD   thiamine 100 MG tablet Take 1 tablet by mouth daily 6/22/22   Néstor Zamudio MD   vitamin D (ERGOCALCIFEROL) 1.25 MG (17023 UT) CAPS capsule Take 1 capsule by mouth once a week 6/22/22   Néstor Zamudio MD   albuterol (PROVENTIL) (2.5 MG/3ML) 0.083% nebulizer solution Take 3 mLs by nebulization 4 times daily 6/7/22   Darius French DO   amLODIPine (NORVASC) 10 MG tablet Take 1 tablet by mouth daily 3/25/22   Néstor Zamudio MD        Allergies:     Patient has no known allergies.     Social History:     Tobacco:    reports that he has been smoking cigarettes. He has a 43.00 pack-year smoking history. He has never used smokeless tobacco.  Alcohol:      reports current alcohol use. Drug Use:  reports that he does not currently use drugs. Family History:     History reviewed. No pertinent family history. Review of Systems:     Positive and Negative as described in HPI. CONSTITUTIONAL:  negative for fevers, chills, sweats, fatigue, weight loss  HEENT:  negative for vision, hearing changes, runny nose, throat pain  RESPIRATORY: Shortness of breath, cough  CARDIOVASCULAR:  negative for chest pain, palpitations.   Patient has pain on right side of lower chest which worse with coughing  GASTROINTESTINAL:  negative for nausea, vomiting, diarrhea, constipation, change in bowel habits, abdominal pain   GENITOURINARY:  negative for difficulty of urination, burning with urination, frequency   INTEGUMENT:  negative for rash, skin lesions, easy bruising   HEMATOLOGIC/LYMPHATIC:  negative for swelling/edema   ALLERGIC/IMMUNOLOGIC:  negative for urticaria , itching  ENDOCRINE:  negative increase in drinking, increase in urination, hot or cold intolerance  MUSCULOSKELETAL:  negative joint pains, muscle aches, swelling of joints, clubbing present   NEUROLOGICAL:  negative for headaches, dizziness, lightheadedness, numbness, pain, tingling extremities  BEHAVIOR/PSYCH:  negative for depression, anxiety    Physical Exam:   BP (!) 141/89   Pulse (!) 104   Temp 97.5 °F (36.4 °C) (Axillary)   Resp 20   Ht 5' 7.72\" (1.72 m)   Wt 147 lb 14.9 oz (67.1 kg)   SpO2 94%   BMI 22.68 kg/m²   Temp (24hrs), Av.7 °F (36.5 °C), Min:97 °F (36.1 °C), Max:98.3 °F (36.8 °C)    Recent Labs     23  1600   POCGLU 215*       Intake/Output Summary (Last 24 hours) at 2023 1259  Last data filed at 2023 1244  Gross per 24 hour   Intake 755 ml   Output 500 ml   Net 255 ml       General Appearance:  alert, well appearing, and in no acute distress  Mental status: oriented to person, place, and time with normal affect  Head:  normocephalic, atraumatic. Eye: no icterus, redness, pupils equal and reactive, extraocular eye movements intact, conjunctiva clear  Ear: normal external ear, no discharge, hearing intact  Nose:  no drainage noted  Mouth: mucous membranes moist  Neck: supple, no carotid bruits, thyroid not palpable  Lungs: Air entry, bilateral decreased, no rales, rhonchi  Cardiovascular: normal rate, regular rhythm, no murmur, gallop, rub.   Abdomen: Soft, nontender, nondistended, normal bowel sounds, no hepatomegaly or splenomegaly  Neurologic: There are no new focal motor or sensory deficits, normal muscle tone and bulk, no abnormal sensation, normal speech, cranial nerves II through XII grossly intact  Skin: No gross lesions, rashes, bruising or bleeding on exposed skin area  Extremities:  peripheral pulses palpable, no pedal edema or calf pain with palpation  Psych: normal affect     Investigations:      Laboratory Testing:  Recent Results (from the past 24 hour(s))   CBC with Auto Differential    Collection Time: 01/11/23  6:15 AM   Result Value Ref Range    WBC 8.5 3.5 - 11.0 k/uL    RBC 3.47 (L) 4.5 - 5.9 m/uL    Hemoglobin 10.7 (L) 13.5 - 17.5 g/dL    Hematocrit 32.5 (L) 41 - 53 %    MCV 93.8 80 - 100 fL    MCH 31.0 26 - 34 pg    MCHC 33.0 31 - 37 g/dL    RDW 13.1 11.5 - 14.9 %    Platelets 562 527 - 590 k/uL    MPV 6.2 6.0 - 12.0 fL    Seg Neutrophils 88 (H) 36 - 66 %    Lymphocytes 2 (L) 24 - 44 %    Monocytes 5 1 - 7 %    Eosinophils % 0 0 - 4 %    Basophils 0 0 - 2 %    Bands 5 0 - 10 %    Segs Absolute 7.47 1.3 - 9.1 k/uL    Absolute Lymph # 0.17 (L) 1.0 - 4.8 k/uL    Absolute Mono # 0.43 0.1 - 1.3 k/uL    Absolute Eos # 0.00 0.0 - 0.4 k/uL    Basophils Absolute 0.00 0.0 - 0.2 k/uL    Absolute Bands # 0.43 0.0 - 1.0 k/uL    Morphology Normal    Basic Metabolic Panel    Collection Time: 01/11/23  6:15 AM   Result Value Ref Range    Glucose 221 (H) 70 - 99 mg/dL    BUN 19 6 - 20 mg/dL    Creatinine 0.40 (L) 0.70 - 1.20 mg/dL    Est, Glom Filt Rate >60 >60 mL/min/1.73m2    Calcium 8.6 8.6 - 10.4 mg/dL    Sodium 134 (L) 135 - 144 mmol/L    Potassium 4.9 3.7 - 5.3 mmol/L    Chloride 88 (L) 98 - 107 mmol/L    CO2 45 (HH) 20 - 31 mmol/L    Anion Gap 1 (L) 9 - 17 mmol/L           Imaging/Diagnostics:      Assessment :      Primary Problem  COPD exacerbation (HCC)    Active Hospital Problems    Diagnosis Date Noted    COPD exacerbation ([facility] [address] [J44.1] 06/22/2022     Priority: Medium       Plan:     Patient status Admit as inpatient in the  Medical ICU    Advanced COPD,, admitted with worsening shortness of breath,  Acute hypercapnic respiratory failure, on BiPAP, steroids, DuoNeb nebulization  Hypertension, restarted home dose of Norvasc, controlled  On DVT prophylaxis Lovenox  Patient is clubbing, CT chest done in 6/22 , which was negative   Overall prognosis guarded, given advanced COPD, chronic hypoxic respiratory failure  Patient had a left renal mass, which was seen in CT scan in June of this year, need urology input as outpatient  1/5   Patient feeling better  Complaining of cough and dryness of mouth with continuous BiPAP  Started on cough medication  Much improved  Will discuss with pulmonary medicine team, likely transfer out of ICU    1/6  Transferred out of ICU  On Mucinex  Not able to expectorate  Will give Acapella  Continues to be on IV steroids  Patient will likely long-term steroids  Will order PT OT eval.    1/7  Patient had episode of NSVT likely precipitated by underlying hypoxia  Electrolytes were checked which were normal  Troponin was normal  Will check echocardiogram  Chest x-ray did yesterday was nonacute  Patient currently on BiPAP  Dose of Mucinex was increased yesterday  Advised to do Acapella to get secretions out  On antibiotics will complete total of 7 days. On IV steroids will likely need long-term steroids on discharge.   CODE STATUS discussed again today patient wants to remain full code  Patient understands that he is critically sick  Will also need urology follow-up for left renal mass    1/8  No further episode of arrhythmia  Echocardiogram is pending  Patient continues to use BiPAP as needed during the day and at night  Using Acapella  On antibiotics  Dose of steroid decreased    1/9  No more episode of NSVT noted  Echocardiogram is pending  On antibiotics IV steroids  Dose of steroid decreased to 40 mg every 12  Patient using BiPAP as needed during the day at night  Overall prognosis is poor  Discussed the CODE STATUS patient wants to be full code. 1/10\  Echocardiogram showed preserved ejection fraction  Patient on 40 every 12  Completed course of cefepime  Continues to await placement    1/11  Dose of prednisone decreased yesterday  Last dose of cefepime today  Continues to await placement  Continue current dose of amlodipine for hypertension  Patient otherwise medically cleared to be discharged  Consultations:   [address] TO PRIMARY CARE PROVIDER  IP CONSULT TO SOCIAL WORK    Patient is admitted as inpatient status because of co-morbidities listed above, severity of signs and symptoms as outlined, requirement for current medical therapies and most importantly because of direct risk to patient if care not provided in a hospital setting. Airam Lam MD  1/11/2023  12:59 PM    Copy sent to Dr. Kelton Aly MD    Please note that this chart was generated using voice recognition Dragon dictation software. Although every effort was made to ensure the accuracy of this automated transcription, some errors in transcription may have occurred.

## 2023-01-11 NOTE — PLAN OF CARE
Problem: Safety - Adult  Goal: Free from fall injury  1/11/2023 1750 by Miguelina Madrid RN  Outcome: Progressing    Free from falls this shift. Bed in lowest position, call light within reach, non skid socks on.      Problem: Skin/Tissue Integrity  Goal: Absence of new skin breakdown  Description: 1.  Monitor for areas of redness and/or skin breakdown  2.  Assess vascular access sites hourly  3.  Every 4-6 hours minimum:  Change oxygen saturation probe site  4.  Every 4-6 hours:  If on nasal continuous positive airway pressure, respiratory therapy assess nares and determine need for appliance change or resting period.  1/11/2023 1750 by Miguelina Madrid RN  Outcome: Progressing     Problem: ABCDS Injury Assessment  Goal: Absence of physical injury  1/11/2023 1750 by Miguelina Madrid RN  Outcome: Progressing     Problem: Discharge Planning  Goal: Discharge to home or other facility with appropriate resources  1/11/2023 1750 by Miguelina Madrid RN  Outcome: Progressing

## 2023-01-11 NOTE — PROGRESS NOTES
Primitivo Hurd MD/Mukul Jaffe MD/ Dr. Mychal Krishnamurthy AGACNP-BC, NP-C      Elder Aschoff APRN NP-C          [id number] "[employer]" APRN - NP-C                                      Critical Care / Pulmonary Progress Note    Patient - Kain Kan   Age - 61 y.o.   - 1965  MRN - [id number]  Acct # - [de-identified]  Date of Admission - 1/3/2023  5:10 PM    Consulting Service/Physician:        Primary Care Physician: Luann Grewal MD    SUBJECTIVE:     Chief Complaint:   Chief Complaint   Patient presents with    Respiratory Distress     Subjective:    Patient's vital signs remained stable. He is currently on his baseline oxygen to 5 L with adequate saturations. He did wear his BiPAP last night. He has been able to get up and move around in the room. A little bit short of breath after activity    VITALS  /82   Pulse 79   Temp 97.9 °F (36.6 °C)   Resp 20   Ht 5' 7.72\" (1.72 m)   Wt 147 lb 14.9 oz (67.1 kg)   SpO2 100%   BMI 22.68 kg/m²   Wt Readings from Last 3 Encounters:   23 147 lb 14.9 oz (67.1 kg)   22 154 lb 1.6 oz (69.9 kg)   22 140 lb 6.4 oz (63.7 kg)     I/O (24 Hours)    Intake/Output Summary (Last 24 hours) at 2023 0944  Last data filed at 1/10/2023 2020  Gross per 24 hour   Intake 355 ml   Output 500 ml   Net -145 ml     Ventilator:   Settings  FiO2 : 40 %  Insp Rise Time (%): 3 %  Exam:   Physical Exam   Constitutional:  Oriented to person, place, and time. Appears well-developed and well-nourished. HENT: Unremarkable  Head: Normocephalic and atraumatic. Neck: Neck supple. Cardiovascular:  Regular rate and rhythm. Pulmonary/Chest: Effort normal and breath sounds diminished throughout  Abdominal: Soft. Bowel sounds are normal. There is no tenderness. Musculoskeletal: Normal range of motion. he exhibits no edema or tenderness.    Neurological:patient is alert and oriented to person, place, and time. Skin: Skin is warm and dry. No rash noted.    Extremities: No edema or discoloration  Infusions:      sodium chloride       Meds:     Current Facility-Administered Medications:     fluticasone (FLONASE) 50 MCG/ACT nasal spray 1 spray, 1 spray, Each Nostril, Daily, Annmarie Bosworth, MD, 1 spray at 01/10/23 1710    perflutren lipid microspheres (DEFINITY) injection 1.5 mL, 1.5 mL, IntraVENous, ONCE PRN, Kip Phillips MD    phenol 1.4 % mouth spray 1 spray, 1 spray, Mouth/Throat, Q2H PRN, Ragini Heart MD    methylPREDNISolone sodium (SOLU-MEDROL) injection 40 mg, 40 mg, IntraVENous, Q12H, Ragini Heart MD, 40 mg at 01/10/23 2325    guaiFENesin [facility]) extended release tablet 1,200 mg, 1,200 mg, Oral, BID, Ragini Heart MD, 1,200 mg at 01/10/23 2047    dextromethorphan (DELSYM) 30 MG/5ML extended release liquid 60 mg, 60 mg, Oral, 2 times per day, Ragini Heart MD, 60 mg at 01/10/23 2047    ipratropium-albuterol (DUONEB) nebulizer solution 1 ampule, 1 ampule, Inhalation, Q4H, Ragini Heart MD, 1 ampule at 01/11/23 0836    ipratropium-albuterol (DUONEB) nebulizer solution 1 ampule, 1 ampule, Inhalation, Q4H PRN, Ragini Heart MD    enoxaparin (LOVENOX) injection 40 mg, 40 mg, SubCUTAneous, Daily, Ammy Cyr MD, 40 mg at 01/10/23 0804    amLODIPine (NORVASC) tablet 10 mg, 10 mg, Oral, Daily, Lito Albrecht APRN - NP, 10 mg at 37/20/65 8147    folic acid (FOLVITE) tablet 1 mg, 1 mg, Oral, Daily, Lito Albrecht APRN - NP, 1 mg at 01/10/23 0804    budesonide-formoterol (SYMBICORT) 160-4.5 MCG/ACT inhaler 2 puff, 2 puff, Inhalation, Daily, Lito Albrecht, APRN - NP, 2 puff at 01/11/23 0836    thiamine mononitrate tablet 100 mg, 100 mg, Oral, Daily, Lito Albrecht, APRN - NP, 100 mg at 01/10/23 0804    sodium chloride flush 0.9 % injection 5-40 mL, 5-40 mL, IntraVENous, 2 times per day, Lito Albrecht APRN - NP    sodium chloride flush 0.9 % injection 5-40 mL, 5-40 mL, IntraVENous, PRN, Jennifer Neal APRN - NP    0.9 % sodium chloride infusion, , IntraVENous, PRN, ERICA Berman - NP    ondansetron (ZOFRAN-ODT) disintegrating tablet 4 mg, 4 mg, Oral, Q8H PRN **OR** ondansetron (ZOFRAN) injection 4 mg, 4 mg, IntraVENous, Q6H PRN, ERICA Berman - NP    polyethylene glycol (GLYCOLAX) packet 17 g, 17 g, Oral, Daily PRN, ERICA Berman - NP    acetaminophen (TYLENOL) tablet 650 mg, 650 mg, Oral, Q6H PRN **OR** acetaminophen (TYLENOL) suppository 650 mg, 650 mg, Rectal, Q6H PRN, ERICA Berman - NP    Lab Results:     [unfilled]  Lab Results   Component Value Date    WBC 8.5 01/11/2023    HGB 10.7 (L) 01/11/2023    HCT 32.5 (L) 01/11/2023    MCV 93.8 01/11/2023     01/11/2023     Lab Results   Component Value Date    CALCIUM 8.6 01/11/2023     (L) 01/11/2023    K 4.9 01/11/2023    CO2 45 (HH) 01/11/2023    CL 88 (L) 01/11/2023    BUN 19 01/11/2023    CREATININE 0.40 (L) 01/11/2023     No components found for: ABGSAMPLETYP, ABGBODYTEMP, ABGPHCORRFOR, BRYPSA0OKUTNP, ABGPHCORRFOOR, ABGPH, ABGPCO2, ABGPO2, ABGBASEEXCES, ABGBASEDEFIC, ABGHCO3, ATDF5WBK, ABGENDTIDALC, ABGALLENSTES, ABGSPO2, ABGSAMEPLESIT, [id number], ABGOXYGENSOU  No results found for: INR, PROTIME         ASSESSMENT:   Principal Problem:    COPD exacerbation (HCC)  Resolved Problems:    * No resolved hospital problems. *      Acute on chronic hypoxemic respiratory failure baseline 5L  Acute on chronic hypercapnic respiratory failure  COPD exacerbation  History of severe COPD; maintained on Spiriva, Symbicort Proventil- follows with Dr. Mallory Rubio COVID-19 infection in December at [facility]  Tobacco abuse  Hypertension  PLAN:   Complete cefepime today  Will decrease steroids and switch to oral prednisone.   Recommend 40 mg daily and decreasing every 3 days x 10 mg until completed  Referral has been made to [facility] for noninvasive ventilator she will follow at skilled nursing facility  No objection to skilled nursing facility when okay with other services  I discussed with primary service  Need outpatient follow-up in our office 4 to 6 weeks after discharge from skilled nursing facility [phone]      Electronically signed by ERICA Murry CNP on 01/11/23     This progress note was completed using a voice transcription system. Every effort was made to ensure accuracy. However, inadvertent computerized transcription errors may be present.     [address] [facility], Critical Care [facility]

## 2023-01-11 NOTE — PROGRESS NOTES
Physical Therapy  [address]    Date: 23  Patient Name: India Adames       Room: 7947/0965-98  MRN: [id number]   Account: [de-identified]   : 1965  (58 y.o.) Gender: male     Referring Practitioner: Dr. Michelle Armenta  Diagnosis: COPD exacerbation, acute on chronic respiratory failure with hypoxia and hypercapnia, pneumonia due to COVID-19  Past Medical History:  has a past medical history of COPD (chronic obstructive pulmonary disease) ([address] and Hypertension. Past Surgical History:   has no past surgical history on file.        Overall Orientation Status: Within Functional Limits  Restrictions/Precautions  Restrictions/Precautions: Contact Precautions;General Precautions (5L O2, needed placed back on Bipap 2* SpO2 86% with EOB activity, anxious)  Required Braces or Orthoses?: No    Subjective: Pt lying in bed upon arrival, agreeable to therapy  Comments: TRACEE Nava approved therapy       Pain Assessment: None - Denies Pain     Oxygen Therapy  SpO2: 94 %  Pulse Oximetry Type: Continuous  Pulse Oximeter Device Mode: Continuous  Pulse Oximeter Device Location: Finger  O2 Device: Nasal cannula  O2 Flow Rate (L/min): 5 L/min    Bed Mobility  Rolling: Stand by assistance  Supine to Sit: Stand by assistance  Sit to Supine: Stand by assistance  Scooting: Stand by assistance  Bed mobility  Scooting: Stand by assistance    Transfers:  Sit to Stand: Stand by assistance  Stand to Sit: Stand by assistance    EXERCISES    Other exercises?: Yes  Other exercises 1: STS x1 from bed; x2 from bedsisw chair  Other exercises 2: pt ambulated with no device and SBA x1 ~15ft x2 and 10ft x1  Other Activities  Comment: Pt required increased time to perform all activities d/t desats with activity        Activity Tolerance: Patient limited by endurance  PT Equipment Recommendations  Equipment Needed: No    Current Treatment Recommendations: Functional mobility training, Gait training, Therapeutic activities, Endurance training    Conditions Requiring Skilled Therapeutic Intervention  History: COPD; Covid  Discharge Recommendations:  Other (comment) (may benefit from Pulmonary Rehab)    AM-[facility] Mobility Inpatient   How much difficulty turning over in bed?: A Little  How much difficulty sitting down on / standing up from a chair with arms?: A Little  How much difficulty moving from lying on back to sitting on side of bed?: A Little  How much help from another person moving to and from a bed to a chair?: A Little  How much help from another person needed to walk in hospital room?: A Little  How much help from another person for climbing 3-5 steps with a railing?: Total  AM-PAC Inpatient Mobility Raw Score : 16  AM-PAC Inpatient T-Scale Score : 40.78  Mobility Inpatient CMS 0-100% Score: 54.16  Mobility Inpatient CMS G-Code Modifier : CK      Goals  Short Term Goals  Time Frame for Short Term Goals: 4-5 days  Short Term Goal 1: mod-I bed mobility  Short Term Goal 2: mod-I transfers  Short Term Goal 3: ambulate with SBA and O2 x 40-50ft  Short Term Goal 4: pt able to tolerate 20-25min og therapeutic activity/exercises without BiPap       01/11/23 1445   PT Individual Minutes   Time In 1014   Time Out 1025   Minutes 11       Electronically signed by Sandi Lee PTA on 1/11/23 at 2:51 PM EST

## 2023-01-11 NOTE — PLAN OF CARE
Problem: Discharge Planning  Goal: Discharge to home or other facility with appropriate resources  Outcome: Progressing     Problem: Safety - Adult  Goal: Free from fall injury  Outcome: Progressing     Problem: ABCDS Injury Assessment  Goal: Absence of physical injury  Outcome: Progressing     Problem: Skin/Tissue Integrity  Goal: Absence of new skin breakdown  Description: 1. Monitor for areas of redness and/or skin breakdown  2. Assess vascular access sites hourly  3. Every 4-6 hours minimum:  Change oxygen saturation probe site  4. Every 4-6 hours:  If on nasal continuous positive airway pressure, respiratory therapy assess nares and determine need for appliance change or resting period.   Outcome: Adequate for Discharge

## 2023-01-11 NOTE — CARE COORDINATION
ONGOING DISCHARGE  NOTE:      Writer reviewed notes, Patient is agreeable to discharge to [facility] [id number]  Phone [phone]  Fax [phone]  Evening fax [phone]       Discharge is pending pre cert. Pre cert was started on 0/40    Patient is unable to return back until 1/14 due to Covid isolation          Will continue to follow for additional discharge needs.      Electronically signed by Yana Avalos RN on 1/11/2023 at 3:10 PM  .

## 2023-01-11 NOTE — PROGRESS NOTES
[address]    HISTORY AND PHYSICAL EXAMINATION            Date:   1/10/2023  Patient name:  Keith Regalado  Date of admission:  1/3/2023  5:10 PM  MRN:   [id number]  Account:  [de-identified]  YOB: 1965  PCP:    Néstor Zamudio MD  Room:   2094/2094-01  Code Status:    Full Code    Chief Complaint:     Chief Complaint   Patient presents with    Respiratory Distress       History Obtained From:     patient, electronic medical record    History of Present Illness: The patient is a 61 y.o. Non- / non  male who presents with Respiratory Distress   and he is admitted to the hospital for the management of shortness of breath  Patient past medical history multiple medical problem which include hypertension, advanced COPD, chronic alcoholism, patient, was admitted with worsening shortness of breath, symptoms are going on for last few days before presentation  Patient apparently was not taking his medication  Was admitted at [facility] with [facility]  Patient denying complaints of chest pain  In emergency room COVID was positive, was cleared by pulmonary medicine team for droplet plus precautions  Patient in emergency room, was found to have pH of 7.2, PCO2 was too high to be recorded, remained on BiPAP overnight, repeat PCO2 is 91    1/5   Patient, clinically doing better  Did use BiPAP last night  On intermittent BiPAP  No new complaints      1/7  Patient got transferred out of ICU this morning  Complaining of worsening cough  Not able to get phlegm out  Will give  acapella  DC fluids      1/8  Had episode of worsening shortness of breath yesterday  Was placed on BiPAP  Also had 5 beat run of V. tach  Electrolytes were normal  Patient denied any chest pain or palpitations  Currently on BiPAP  States that he is feeling slightly better than yesterday  His vital signs been stable  Patient has been afebrile.       1/9  On BiPAP  States that he is feeling the same  Denies any chest pain  Has been afebrile  Echocardiogram pending. 1/10  Patient states is feeling okay  Continues to be on IV steroids  Finished antibiotic course  Using BiPAP as needed during the day and at night  Continues to await placement. Vital stable. Past Medical History:     Past Medical History:   Diagnosis Date    COPD (chronic obstructive pulmonary disease) ([address]     Hypertension         Past Surgical History:     History reviewed. No pertinent surgical history. Medications Prior to Admission:     Prior to Admission medications    Medication Sig Start Date End Date Taking? Authorizing Provider   SYMBICORT 160-4.5 MCG/ACT AERO INHALE 2 PUFFS BY MOUTH ONCE DAILY 1/5/23   Jaki Monreal MD   traZODone (DESYREL) 50 MG tablet Take 1 tablet by mouth nightly 1/5/23   Jaki Monreal MD   Selena Neer 18 MCG inhalation capsule Inhale 1 puff by mouth once daily 1/5/23   Jaki Monreal MD   escitalopram (LEXAPRO) 10 MG tablet Take 1 tablet by mouth daily  Patient not taking: Reported on 1/3/2023 6/22/22   Carroll Shah MD   folic acid (FOLVITE) 1 MG tablet Take 1 tablet by mouth daily 6/22/22   Carroll Shah MD   thiamine 100 MG tablet Take 1 tablet by mouth daily 6/22/22   Carroll Shah MD   vitamin D (ERGOCALCIFEROL) 1.25 MG (53358 UT) CAPS capsule Take 1 capsule by mouth once a week 6/22/22   Carroll Shah MD   albuterol (PROVENTIL) (2.5 MG/3ML) 0.083% nebulizer solution Take 3 mLs by nebulization 4 times daily 6/7/22   Kelsey Still DO   amLODIPine (NORVASC) 10 MG tablet Take 1 tablet by mouth daily 3/25/22   Carroll Shah MD        Allergies:     Patient has no known allergies. Social History:     Tobacco:    reports that he has been smoking cigarettes. He has a 43.00 pack-year smoking history. He has never used smokeless tobacco.  Alcohol:      reports current alcohol use. Drug Use:  reports that he does not currently use drugs.     Family History: History reviewed. No pertinent family history. Review of Systems:     Positive and Negative as described in HPI. CONSTITUTIONAL:  negative for fevers, chills, sweats, fatigue, weight loss  HEENT:  negative for vision, hearing changes, runny nose, throat pain  RESPIRATORY: Shortness of breath, cough  CARDIOVASCULAR:  negative for chest pain, palpitations. Patient has pain on right side of lower chest which worse with coughing  GASTROINTESTINAL:  negative for nausea, vomiting, diarrhea, constipation, change in bowel habits, abdominal pain   GENITOURINARY:  negative for difficulty of urination, burning with urination, frequency   INTEGUMENT:  negative for rash, skin lesions, easy bruising   HEMATOLOGIC/LYMPHATIC:  negative for swelling/edema   ALLERGIC/IMMUNOLOGIC:  negative for urticaria , itching  ENDOCRINE:  negative increase in drinking, increase in urination, hot or cold intolerance  MUSCULOSKELETAL:  negative joint pains, muscle aches, swelling of joints, clubbing present   NEUROLOGICAL:  negative for headaches, dizziness, lightheadedness, numbness, pain, tingling extremities  BEHAVIOR/PSYCH:  negative for depression, anxiety    Physical Exam:   /74   Pulse 97   Temp 98.3 °F (36.8 °C) (Oral)   Resp 18   Ht 5' 7.72\" (1.72 m)   Wt 146 lb 9.7 oz (66.5 kg)   SpO2 97%   BMI 22.48 kg/m²   Temp (24hrs), Av.1 °F (36.7 °C), Min:97.8 °F (36.6 °C), Max:98.3 °F (36.8 °C)    Recent Labs     23  1600   POCGLU 215*       Intake/Output Summary (Last 24 hours) at 1/10/2023 1938  Last data filed at 1/10/2023 1319  Gross per 24 hour   Intake 710 ml   Output 800 ml   Net -90 ml       General Appearance:  alert, well appearing, and in no acute distress  Mental status: oriented to person, place, and time with normal affect  Head:  normocephalic, atraumatic.   Eye: no icterus, redness, pupils equal and reactive, extraocular eye movements intact, conjunctiva clear  Ear: normal external ear, no discharge, hearing intact  Nose:  no drainage noted  Mouth: mucous membranes moist  Neck: supple, no carotid bruits, thyroid not palpable  Lungs: Air entry, bilateral decreased, no rales, rhonchi  Cardiovascular: normal rate, regular rhythm, no murmur, gallop, rub. Abdomen: Soft, nontender, nondistended, normal bowel sounds, no hepatomegaly or splenomegaly  Neurologic: There are no new focal motor or sensory deficits, normal muscle tone and bulk, no abnormal sensation, normal speech, cranial nerves II through XII grossly intact  Skin: No gross lesions, rashes, bruising or bleeding on exposed skin area  Extremities:  peripheral pulses palpable, no pedal edema or calf pain with palpation  Psych: normal affect     Investigations:      Laboratory Testing:  No results found for this or any previous visit (from the past 24 hour(s)).         Imaging/Diagnostics:      Assessment :      Primary Problem  COPD exacerbation [facility])    Active Hospital Problems    Diagnosis Date Noted    COPD exacerbation ([facility] [address] [J44.1] 06/22/2022     Priority: Medium       Plan:     Patient status Admit as inpatient in the  Medical ICU    Advanced COPD,, admitted with worsening shortness of breath,  Acute hypercapnic respiratory failure, on BiPAP, steroids, DuoNeb nebulization  Hypertension, restarted home dose of Norvasc, controlled  On DVT prophylaxis Lovenox  Patient is clubbing, CT chest done in 6/22 , which was negative   Overall prognosis guarded, given advanced COPD, chronic hypoxic respiratory failure  Patient had a left renal mass, which was seen in CT scan in June of this year, need urology input as outpatient  1/5   Patient feeling better  Complaining of cough and dryness of mouth with continuous BiPAP  Started on cough medication  Much improved  Will discuss with pulmonary medicine team, likely transfer out of ICU    1/6  Transferred out of ICU  On Mucinex  Not able to expectorate  Will give Acapella  Continues to be on IV steroids  Patient will likely long-term steroids  Will order PT OT eval.    1/7  Patient had episode of NSVT likely precipitated by underlying hypoxia  Electrolytes were checked which were normal  Troponin was normal  Will check echocardiogram  Chest x-ray did yesterday was nonacute  Patient currently on BiPAP  Dose of Mucinex was increased yesterday  Advised to do Acapella to get secretions out  On antibiotics will complete total of 7 days. On IV steroids will likely need long-term steroids on discharge. CODE STATUS discussed again today patient wants to remain full code  Patient understands that he is critically sick  Will also need urology follow-up for left renal mass    1/8  No further episode of arrhythmia  Echocardiogram is pending  Patient continues to use BiPAP as needed during the day and at night  Using Acapella  On antibiotics  Dose of steroid decreased    1/9  No more episode of NSVT noted  Echocardiogram is pending  On antibiotics IV steroids  Dose of steroid decreased to 40 mg every 12  Patient using BiPAP as needed during the day at night  Overall prognosis is poor  Discussed the CODE STATUS patient wants to be full code. 1/10\  Echocardiogram showed preserved ejection fraction  Patient on 40 every 12  Completed course of cefepime  Continues to await placement      Consultations:   IP CONSULT TO PULMONOLOGY  IP CONSULT TO PRIMARY CARE PROVIDER  IP CONSULT TO SOCIAL WORK    Patient is admitted as inpatient status because of co-morbidities listed above, severity of signs and symptoms as outlined, requirement for current medical therapies and most importantly because of direct risk to patient if care not provided in a hospital setting. Hayley Gordillo MD  1/10/2023  7:38 PM    Copy sent to Dr. Marjorie Waterman MD    Please note that this chart was generated using voice recognition Dragon dictation software.   Although every effort was made to ensure the accuracy of this automated transcription, some errors in transcription may have occurred.

## 2023-01-11 NOTE — PROGRESS NOTES
[address]   OCCUPATIONAL THERAPY MISSED TREATMENT NOTE   INPATIENT   Date: 23  Patient Name: Kain Kan       Room: 3684/2547-34  MRN: [id number]   Account #: [de-identified]    : 1965  (61 y.o.)  Gender: male   Referring Practitioner: Dr. Kris Lobo  Diagnosis: COPD exacerbation, acute on chronic respiratory failure with hypoxia and hypercapnia, pneumonia due to COVID-19             REASON FOR MISSED TREATMENT:   Patient reports feeling fatigued this date and requests therapy to re-attempt at later date.       Electronically signed by ALPA Carbajal on 2023 at 3:20 PM

## 2023-01-12 PROCEDURE — 2700000000 HC OXYGEN THERAPY PER DAY

## 2023-01-12 PROCEDURE — 6370000000 HC RX 637 (ALT 250 FOR IP): Performed by: INTERNAL MEDICINE

## 2023-01-12 PROCEDURE — 94761 N-INVAS EAR/PLS OXIMETRY MLT: CPT

## 2023-01-12 PROCEDURE — 94660 CPAP INITIATION&MGMT: CPT

## 2023-01-12 PROCEDURE — 99232 SBSQ HOSP IP/OBS MODERATE 35: CPT | Performed by: INTERNAL MEDICINE

## 2023-01-12 PROCEDURE — 1200000000 HC SEMI PRIVATE

## 2023-01-12 PROCEDURE — 6370000000 HC RX 637 (ALT 250 FOR IP): Performed by: NURSE PRACTITIONER

## 2023-01-12 PROCEDURE — 94640 AIRWAY INHALATION TREATMENT: CPT

## 2023-01-12 PROCEDURE — 6360000002 HC RX W HCPCS: Performed by: INTERNAL MEDICINE

## 2023-01-12 RX ADMIN — AMLODIPINE BESYLATE 10 MG: 10 TABLET ORAL at 07:41

## 2023-01-12 RX ADMIN — FOLIC ACID 1 MG: 1 TABLET ORAL at 07:41

## 2023-01-12 RX ADMIN — BUDESONIDE AND FORMOTEROL FUMARATE DIHYDRATE 2 PUFF: 160; 4.5 AEROSOL RESPIRATORY (INHALATION) at 06:53

## 2023-01-12 RX ADMIN — IPRATROPIUM BROMIDE AND ALBUTEROL SULFATE 1 AMPULE: 2.5; .5 SOLUTION RESPIRATORY (INHALATION) at 19:23

## 2023-01-12 RX ADMIN — IPRATROPIUM BROMIDE AND ALBUTEROL SULFATE 1 AMPULE: 2.5; .5 SOLUTION RESPIRATORY (INHALATION) at 06:48

## 2023-01-12 RX ADMIN — GUAIFENESIN 1200 MG: 600 TABLET, EXTENDED RELEASE ORAL at 07:41

## 2023-01-12 RX ADMIN — PREDNISONE 40 MG: 20 TABLET ORAL at 07:41

## 2023-01-12 RX ADMIN — THIAMINE HCL TAB 100 MG 100 MG: 100 TAB at 07:41

## 2023-01-12 RX ADMIN — GUAIFENESIN 1200 MG: 600 TABLET, EXTENDED RELEASE ORAL at 20:57

## 2023-01-12 RX ADMIN — IPRATROPIUM BROMIDE AND ALBUTEROL SULFATE 1 AMPULE: 2.5; .5 SOLUTION RESPIRATORY (INHALATION) at 23:27

## 2023-01-12 RX ADMIN — ENOXAPARIN SODIUM 40 MG: 100 INJECTION SUBCUTANEOUS at 07:41

## 2023-01-12 RX ADMIN — FLUTICASONE PROPIONATE 1 SPRAY: 50 SPRAY, METERED NASAL at 07:42

## 2023-01-12 RX ADMIN — IPRATROPIUM BROMIDE AND ALBUTEROL SULFATE 1 AMPULE: 2.5; .5 SOLUTION RESPIRATORY (INHALATION) at 10:39

## 2023-01-12 RX ADMIN — IPRATROPIUM BROMIDE AND ALBUTEROL SULFATE 1 AMPULE: 2.5; .5 SOLUTION RESPIRATORY (INHALATION) at 14:41

## 2023-01-12 RX ADMIN — Medication 60 MG: at 20:58

## 2023-01-12 RX ADMIN — Medication 60 MG: at 07:41

## 2023-01-12 ASSESSMENT — PAIN SCALES - GENERAL: PAINLEVEL_OUTOF10: 0

## 2023-01-12 NOTE — PROGRESS NOTES
PROVIDE ADEQUATE OXYGENATION WITH ACCEPTABLE SP02/ABG'S    [x]  IDENTIFY APPROPRIATE OXYGEN THERAPY  [x]   MONITOR SP02/ABG'S AS NEEDED   [x]   PATIENT EDUCATION AS NEEDED   BRONCHOSPASM/BRONCHOCONSTRICTION     [x]         IMPROVE AERATION/BREATH SOUNDS  [x]   ADMINISTER BRONCHODILATOR THERAPY AS APPROPRIATE  [x]   ASSESS BREATH SOUNDS  []   IMPLEMENT AEROSOL/MDI PROTOCOL  [x]   PATIENT EDUCATION AS NEEDED   NONINVASIVE VENTILATION    PROVIDE OPTIMAL VENTILATION/ACCEPTABLE SPO2   IMPLEMENT NONINVASIVE VENTILATION PROTOCOL   MAINTAIN ACCEPTABLE SPO2   ASSESS SKIN INTEGRITY/BREAKDOWN SCORE   PATIENT EDUCATION AS NEEDED   BIPAP AS NEEDED

## 2023-01-12 NOTE — PROGRESS NOTES
Primitivo Hurd MD/Mukul Orellana MD/ Gabriel Mcdonnell MD/Don MALDONADO AGACNP-BC, NP-C      Angelina MALDONADO NP-C     Elbert Kent APRN NP-C                                           Pulmonary Progress Note    Patient - Simta Victoria   Age - 61 y.o.   - 1965  MRN - [id number]  Acct # - [de-identified]  Date of Admission - 1/3/2023  5:10 PM    Consulting Service/Physician:       Primary Care Physician: Ralph Shine MD    SUBJECTIVE:     Chief Complaint:   Chief Complaint   Patient presents with    Respiratory Distress     Subjective:    He tells me he is breathing easier, he still gets short of breath with activity. He does still have some wheezing. He did bring up some yellow phlegm this morning. He has not had any fevers or chills. He tells me he always has a wheeze in his chest.  He states he no longer plans to smoke. He was smoking prior to admission. Currently awaiting precertification, I am told that he cannot go to the facility until  due to prior COVID in December at [facility].  He tested positive on 2022. Not sure why the delay for discharge on  he was positive greater than 30 days ago    VITALS  /83   Pulse 80   Temp 98.1 °F (36.7 °C)   Resp 20   Ht 5' 7.72\" (1.72 m)   Wt 137 lb 12.6 oz (62.5 kg)   SpO2 95%   BMI 21.13 kg/m²   Wt Readings from Last 3 Encounters:   23 137 lb 12.6 oz (62.5 kg)   22 154 lb 1.6 oz (69.9 kg)   22 140 lb 6.4 oz (63.7 kg)     I/O (24 Hours)    Intake/Output Summary (Last 24 hours) at 2023 1203  Last data filed at 2023 1540  Gross per 24 hour   Intake 640 ml   Output 125 ml   Net 515 ml     Ventilator:   Settings  FiO2 : 40 %  Insp Rise Time (%): 3 %  Exam:   Physical Exam   Constitutional:  Oriented to person, place, and time. Appears well-developed and well-nourished.   Able to sit up in bed no apparent distress  HENT: Unremarkable, nasal cannula in place  Head: Normocephalic and atraumatic. Eyes: EOM are normal. Pupils are equal, round, and reactive to light. Neck: Neck supple. Cardiovascular:  Regular rate and rhythm. Normal heart tones. No JVD. Pulmonary/Chest: Effort normal and breath sounds diminished with some expiratory wheezing, no rhonchi or rales, respirations easy nonlabored at rest on 5 L  Abdominal: Soft. Bowel sounds are normal. There is no tenderness. Musculoskeletal: Normal range of motion. Some generalized weakness  Neurological:patient is alert and oriented to person, place, and time. Skin: Skin is warm and dry.   Extremities: No edema   Infusions:      sodium chloride       Meds:     Current Facility-Administered Medications:     predniSONE (DELTASONE) tablet 40 mg, 40 mg, Oral, Daily, ERICA Hamilton - CNP, 40 mg at 01/12/23 0741    fluticasone (FLONASE) 50 MCG/ACT nasal spray 1 spray, 1 spray, Each Nostril, Daily, Toby Lozano MD, 1 spray at 01/12/23 0742    perflutren lipid microspheres (DEFINITY) injection 1.5 mL, 1.5 mL, IntraVENous, ONCE PRN, Giuliana Villalta MD    phenol 1.4 % mouth spray 1 spray, 1 spray, Mouth/Throat, Q2H PRN, Shmuel Dunaway MD    guaiFENesin [facility]) extended release tablet 1,200 mg, 1,200 mg, Oral, BID, Shmuel Dunaway MD, 1,200 mg at 01/12/23 0741    dextromethorphan (DELSYM) 30 MG/5ML extended release liquid 60 mg, 60 mg, Oral, 2 times per day, Shmuel Dunaway MD, 60 mg at 01/12/23 0741    ipratropium-albuterol (DUONEB) nebulizer solution 1 ampule, 1 ampule, Inhalation, Q4H, Shmuel Dunaway MD, 1 ampule at 01/12/23 1039    ipratropium-albuterol (DUONEB) nebulizer solution 1 ampule, 1 ampule, Inhalation, Q4H PRN, Shmuel Dunaway MD    enoxaparin (LOVENOX) injection 40 mg, 40 mg, SubCUTAneous, Daily, Naeem Malik MD, 40 mg at 01/12/23 0741    amLODIPine (NORVASC) tablet 10 mg, 10 mg, Oral, Daily, ERICA Jerome - NP, 10 mg at 28/41/03 7675    folic acid (FOLVITE) tablet 1 mg, 1 mg, Oral, Daily, Anell Blow, APRN - NP, 1 mg at 01/12/23 0741    budesonide-formoterol (SYMBICORT) 160-4.5 MCG/ACT inhaler 2 puff, 2 puff, Inhalation, Daily, Anell Blow, APRN - NP, 2 puff at 01/12/23 5943    thiamine mononitrate tablet 100 mg, 100 mg, Oral, Daily, Anell Blow, APRN - NP, 100 mg at 01/12/23 0741    sodium chloride flush 0.9 % injection 5-40 mL, 5-40 mL, IntraVENous, 2 times per day, Anell Blow, APRN - NP    sodium chloride flush 0.9 % injection 5-40 mL, 5-40 mL, IntraVENous, PRN, Jennifer Goldi, APRN - NP    0.9 % sodium chloride infusion, , IntraVENous, PRN, Anell Blow, APRN - NP    ondansetron (ZOFRAN-ODT) disintegrating tablet 4 mg, 4 mg, Oral, Q8H PRN **OR** ondansetron (ZOFRAN) injection 4 mg, 4 mg, IntraVENous, Q6H PRN, Anell Blow, APRN - NP    polyethylene glycol (GLYCOLAX) packet 17 g, 17 g, Oral, Daily PRN, Anell Blow, APRN - NP    acetaminophen (TYLENOL) tablet 650 mg, 650 mg, Oral, Q6H PRN **OR** acetaminophen (TYLENOL) suppository 650 mg, 650 mg, Rectal, Q6H PRN, Anell Blow, APRN - NP    Lab Results:     Lab Results   Component Value Date    WBC 8.5 01/11/2023    HGB 10.7 (L) 01/11/2023    HCT 32.5 (L) 01/11/2023    MCV 93.8 01/11/2023     01/11/2023     Lab Results   Component Value Date    CALCIUM 8.6 01/11/2023     (L) 01/11/2023    K 4.9 01/11/2023    CO2 45 (HH) 01/11/2023    CL 88 (L) 01/11/2023    BUN 19 01/11/2023    CREATININE 0.40 (L) 01/11/2023       Radiology:   No new chest imaging    ASSESSMENT:       Acute on chronic hypoxemic respiratory failure baseline 5L, currently at his baseline  Acute on chronic hypercapnic respiratory failure-not very compliant with BiPAP, bicarb 45 on CHEM profile  COPD exacerbation-slowly improving  History of severe COPD; maintained on Spiriva, Symbicort Proventil- follows with Dr. Car Butler  Recent COVID-19 infection in December at [facility], tested positive on 12/8/2022, greater than 30 days ago  Tobacco abuse  Hypertension  Full code  PLAN:   Completed antibiotics  Continue with prednisone taper, agree would go down slowly by 10 mg every 3 days  Increase activity as tolerated  Encourage BiPAP at at bedtime and with naps, not tolerating for very long periods  Encourage smoking cessation, he tells me currently he does not plan to resume  Continue with bronchodilator therapy  Referral was made to [facility] for noninvasive ventilator they will follow-up when he was at the facility  Recommend BiPAP at night at the facility  No objection to discharge anytime from our standpoint, not sure why delay of 2 more days due to prior COVID when it was greater than 30 days ago  Would recommend pulmonary follow-up after discharge from facility  Discussed with RN      Electronically signed by ERICA Bernal CNP on 01/12/23     This progress note was completed using a voice transcription system. Every effort was made to ensure accuracy. However, inadvertent computerized transcription errors may be present.     MAE MALDONADO AGACNP-BC, NP-C  [facility] Pulmonary, Critical Care & Sleep

## 2023-01-12 NOTE — PROGRESS NOTES
[address]   OCCUPATIONAL THERAPY MISSED TREATMENT NOTE   INPATIENT   Date: 23  Patient Name: Long Ribera       Room: 4189/5792-87  MRN: [id number]   Account #: [de-identified]    : 1965  (61 y.o.)  Gender: male   Referring Practitioner: Dr. Clare Zhang  Diagnosis: COPD exacerbation, acute on chronic respiratory failure with hypoxia and hypercapnia, pneumonia due to COVID-19             REASON FOR MISSED TREATMENT:  Patient refusal   -    Attempted to see patient at 64. PtCassy Montero declined and stated \"I already did therapy earlier, come back tomorrow. \" Will continue to follow patient and treat as appropriate.         LARS Traore/ELI

## 2023-01-12 NOTE — CARE COORDINATION
Authorization for Addie Catalan was started 1/11.     Electronically signed by MATTEO Ireland on 1/12/2023 at 11:49 AM

## 2023-01-12 NOTE — PROGRESS NOTES
DARR[facility] [address] 52    HISTORY AND PHYSICAL EXAMINATION            Date:   1/12/2023  Patient name:  Micheal Hermosillo  Date of admission:  1/3/2023  5:10 PM  MRN:   [id number]  Account:  [de-identified]  YOB: 1965  PCP:    Estefania Kim MD  Room:   3316/4820-53  Code Status:    Full Code    Chief Complaint:     Chief Complaint   Patient presents with    Respiratory Distress       History Obtained From:     patient, electronic medical record    History of Present Illness: The patient is a 61 y.o. Non- / non  male who presents with Respiratory Distress   and he is admitted to the hospital for the management of shortness of breath  Patient past medical history multiple medical problem which include hypertension, advanced COPD, chronic alcoholism, patient, was admitted with worsening shortness of breath, symptoms are going on for last few days before presentation  Patient apparently was not taking his medication  Was admitted at [facility] with [facility]  Patient denying complaints of chest pain  In emergency room COVID was positive, was cleared by pulmonary medicine team for droplet plus precautions  Patient in emergency room, was found to have pH of 7.2, PCO2 was too high to be recorded, remained on BiPAP overnight, repeat PCO2 is 91    1/5   Patient, clinically doing better  Did use BiPAP last night  On intermittent BiPAP  No new complaints      1/7  Patient got transferred out of ICU this morning  Complaining of worsening cough  Not able to get phlegm out  Will give  acapella  DC fluids      1/8  Had episode of worsening shortness of breath yesterday  Was placed on BiPAP  Also had 5 beat run of V. tach  Electrolytes were normal  Patient denied any chest pain or palpitations  Currently on BiPAP  States that he is feeling slightly better than yesterday  His vital signs been stable  Patient has been afebrile.       1/9  On BiPAP  States that he is feeling the same  Denies any chest pain  Has been afebrile  Echocardiogram pending. 1/10  Patient states is feeling okay  Continues to be on IV steroids  Finished antibiotic course  Using BiPAP as needed during the day and at night  Continues to await placement. Vital stable. 1/11  No overnight issues, patient continues to be dyspneic on minimal exertion which is likely his baseline now  Using BiPAP as needed during the day and at night  Denies any chest pain otherwise tolerating p.o. well    1/12  Still feels the same  Vitals stable patient has been afebrile  Continues to use BiPAP as needed  Past Medical History:     Past Medical History:   Diagnosis Date    COPD (chronic obstructive pulmonary disease) ([facility] [address]     Hypertension         Past Surgical History:     History reviewed. No pertinent surgical history. Medications Prior to Admission:     Prior to Admission medications    Medication Sig Start Date End Date Taking? Authorizing Provider   dextromethorphan (DELSYM) 30 MG/5ML extended release liquid Take 10 mLs by mouth every 12 hours for 10 days 1/11/23 1/21/23 Yes Tobias Gamboa MD   guaiFENesin (MUCINEX) 600 MG extended release tablet Take 2 tablets by mouth 2 times daily 1/11/23  Yes Tobias Gamboa MD   predniSONE (DELTASONE) 10 MG tablet tAKE 4 TABLETS FOR 2 DAYS , Take 3 tablets together daily for 3 days, then 2 tablets daily for 3 days, then 1 tablet daily for 3 days.  1/11/23 1/21/23 Yes Tobias Gamboa MD   SYMBICORT 160-4.5 MCG/ACT AERO INHALE 2 PUFFS BY MOUTH ONCE DAILY 1/5/23   Quinton Wilson MD   traZODone (DESYREL) 50 MG tablet Take 1 tablet by mouth nightly 1/5/23   Quinton Wilson MD   Xenia Josiah 18 MCG inhalation capsule Inhale 1 puff by mouth once daily 1/5/23   Quinton Wilson MD   folic acid (FOLVITE) 1 MG tablet Take 1 tablet by mouth daily 6/22/22   Lanette Soares MD   thiamine 100 MG tablet Take 1 tablet by mouth daily 6/22/22   Lanette Soares MD   vitamin D (ERGOCALCIFEROL) 1.25 MG (98206 UT) CAPS capsule Take 1 capsule by mouth once a week 6/22/22   Luann Grewal MD   albuterol (PROVENTIL) (2.5 MG/3ML) 0.083% nebulizer solution Take 3 mLs by nebulization 4 times daily 6/7/22   Maureen Blackwell DO   amLODIPine (NORVASC) 10 MG tablet Take 1 tablet by mouth daily 3/25/22   Luann Grewal MD        Allergies:     Patient has no known allergies. Social History:     Tobacco:    reports that he has been smoking cigarettes. He has a 43.00 pack-year smoking history. He has never used smokeless tobacco.  Alcohol:      reports current alcohol use. Drug Use:  reports that he does not currently use drugs. Family History:     History reviewed. No pertinent family history. Review of Systems:     Positive and Negative as described in HPI. CONSTITUTIONAL:  negative for fevers, chills, sweats, fatigue, weight loss  HEENT:  negative for vision, hearing changes, runny nose, throat pain  RESPIRATORY: Shortness of breath, cough  CARDIOVASCULAR:  negative for chest pain, palpitations.   Patient has pain on right side of lower chest which worse with coughing  GASTROINTESTINAL:  negative for nausea, vomiting, diarrhea, constipation, change in bowel habits, abdominal pain   GENITOURINARY:  negative for difficulty of urination, burning with urination, frequency   INTEGUMENT:  negative for rash, skin lesions, easy bruising   HEMATOLOGIC/LYMPHATIC:  negative for swelling/edema   ALLERGIC/IMMUNOLOGIC:  negative for urticaria , itching  ENDOCRINE:  negative increase in drinking, increase in urination, hot or cold intolerance  MUSCULOSKELETAL:  negative joint pains, muscle aches, swelling of joints, clubbing present   NEUROLOGICAL:  negative for headaches, dizziness, lightheadedness, numbness, pain, tingling extremities  BEHAVIOR/PSYCH:  negative for depression, anxiety    Physical Exam:   /83   Pulse 80   Temp 98.1 °F (36.7 °C)   Resp 20   Ht 5' 7.72\" (1.72 m)   Wt 137 lb 12.6 oz (62.5 kg)   SpO2 95%   BMI 21.13 kg/m²   Temp (24hrs), Av.2 °F (36.8 °C), Min:98.1 °F (36.7 °C), Max:98.2 °F (36.8 °C)    Recent Labs     23  1104   POCGLU 126*     No intake or output data in the 24 hours ending 23 1644      General Appearance:  alert, well appearing, and in no acute distress  Mental status: oriented to person, place, and time with normal affect  Head:  normocephalic, atraumatic. Eye: no icterus, redness, pupils equal and reactive, extraocular eye movements intact, conjunctiva clear  Ear: normal external ear, no discharge, hearing intact  Nose:  no drainage noted  Mouth: mucous membranes moist  Neck: supple, no carotid bruits, thyroid not palpable  Lungs: Air entry, bilateral decreased, no rales, rhonchi  Cardiovascular: normal rate, regular rhythm, no murmur, gallop, rub. Abdomen: Soft, nontender, nondistended, normal bowel sounds, no hepatomegaly or splenomegaly  Neurologic: There are no new focal motor or sensory deficits, normal muscle tone and bulk, no abnormal sensation, normal speech, cranial nerves II through XII grossly intact  Skin: No gross lesions, rashes, bruising or bleeding on exposed skin area  Extremities:  peripheral pulses palpable, no pedal edema or calf pain with palpation  Psych: normal affect     Investigations:      Laboratory Testing:  No results found for this or any previous visit (from the past 24 hour(s)).           Imaging/Diagnostics:      Assessment :      Primary Problem  COPD exacerbation [facility])    Active Hospital Problems    Diagnosis Date Noted    [facility] [J44.1] 2022     Priority: Medium       Plan:     Patient status Admit as inpatient in the  Medical ICU    Advanced COPD,, admitted with worsening shortness of breath,  Acute hypercapnic respiratory failure, on BiPAP, steroids, DuoNeb nebulization  Hypertension, restarted home dose of Norvasc, controlled  On DVT prophylaxis Lovenox  Patient is clubbing, CT chest done in 6/22 , which was negative   Overall prognosis guarded, given advanced COPD, chronic hypoxic respiratory failure  Patient had a left renal mass, which was seen in CT scan in June of this year, need urology input as outpatient  1/5   Patient feeling better  Complaining of cough and dryness of mouth with continuous BiPAP  Started on cough medication  Much improved  Will discuss with pulmonary medicine team, likely transfer out of ICU    1/6  Transferred out of ICU  On Mucinex  Not able to expectorate  Will give Acapella  Continues to be on IV steroids  Patient will likely long-term steroids  Will order PT OT eval.    1/7  Patient had episode of NSVT likely precipitated by underlying hypoxia  Electrolytes were checked which were normal  Troponin was normal  Will check echocardiogram  Chest x-ray did yesterday was nonacute  Patient currently on BiPAP  Dose of Mucinex was increased yesterday  Advised to do Acapella to get secretions out  On antibiotics will complete total of 7 days. On IV steroids will likely need long-term steroids on discharge. CODE STATUS discussed again today patient wants to remain full code  Patient understands that he is critically sick  Will also need urology follow-up for left renal mass    1/8  No further episode of arrhythmia  Echocardiogram is pending  Patient continues to use BiPAP as needed during the day and at night  Using Acapella  On antibiotics  Dose of steroid decreased    1/9  No more episode of NSVT noted  Echocardiogram is pending  On antibiotics IV steroids  Dose of steroid decreased to 40 mg every 12  Patient using BiPAP as needed during the day at night  Overall prognosis is poor  Discussed the CODE STATUS patient wants to be full code.     1/10\  Echocardiogram showed preserved ejection fraction  Patient on 40 every 12  Completed course of cefepime  Continues to await placement    1/11  Dose of prednisone decreased yesterday  Last dose of cefepime today  Continues to await placement  Continue current dose of amlodipine for hypertension  Patient otherwise medically cleared to be discharged      1/12  Started on p.o. steroids  Finished antibiotic course  Continue amlodipine for hypertension  Patient medically cleared for discharge awaiting placement. Consultations:   IP CONSULT TO PULMONOLOGY  IP CONSULT TO PRIMARY CARE PROVIDER  IP CONSULT TO SOCIAL WORK    Patient is admitted as inpatient status because of co-morbidities listed above, severity of signs and symptoms as outlined, requirement for current medical therapies and most importantly because of direct risk to patient if care not provided in a hospital setting. Michael Rucker MD  1/12/2023  4:44 PM    Copy sent to Dr. Nyoka Hodgkins, MD    Please note that this chart was generated using voice recognition Dragon dictation software. Although every effort was made to ensure the accuracy of this automated transcription, some errors in transcription may have occurred.

## 2023-01-12 NOTE — PLAN OF CARE
Problem: Discharge Planning  Goal: Discharge to home or other facility with appropriate resources  1/11/2023 2257 by Yaneth Bess RN  Outcome: Progressing     Problem: Safety - Adult  Goal: Free from fall injury  1/11/2023 2257 by Yaneth Bess RN  Outcome: Progressing     Problem: Skin/Tissue Integrity  Goal: Absence of new skin breakdown  Description: 1. Monitor for areas of redness and/or skin breakdown  2. Assess vascular access sites hourly  3. Every 4-6 hours minimum:  Change oxygen saturation probe site  4. Every 4-6 hours:  If on nasal continuous positive airway pressure, respiratory therapy assess nares and determine need for appliance change or resting period.   1/11/2023 2257 by Yaneth Bess RN  Outcome: Progressing     Problem: ABCDS Injury Assessment  Goal: Absence of physical injury  1/11/2023 2257 by Yaneth Bess RN  Outcome: Progressing

## 2023-01-12 NOTE — PROGRESS NOTES
Physical Therapy  DATE: 2023    NAME: Gloria Bazzi  MRN: [id number]   : 1965    Patient not seen this date for Physical Therapy due to:      [] Cancel by RN or physician due to:    [] Hemodialysis    [] Critical Lab Value Level     [] Blood transfusion in progress    [] Acute or unstable cardiovascular status   _MAP < 55 or more than >115  _HR < 40 or > 130    [] Acute or unstable pulmonary status   -FiO2 > 60%   _RR < 5 or >40    _O2 sats < 85%    [] Strict Bedrest    [] Off Unit for surgery or procedure    [] Off Unit for testing       [] Pending imaging to R/O fracture    [x] Refusal by Patient; checked on pt @ 1401. Pt lying in bed stating he has been \"working out all morning\". Pt demonstrated leg lifts while lying in bed to therapist and also stated that he has been up and walking in his room throughout the day and that he is tired at the moment. Will continue to follow. [] Other      [] PT being discontinued at this time. Patient independent. No further needs. [] PT being discontinued at this time as the patient has been transferred to hospice care. No further needs.       Electronically signed by Karmen Lynch PTA on 23 at 2:14 PM EST

## 2023-01-13 PROBLEM — J96.12 HYPERCAPNIC RESPIRATORY FAILURE, CHRONIC (HCC): Status: ACTIVE | Noted: 2023-01-13

## 2023-01-13 PROCEDURE — 6370000000 HC RX 637 (ALT 250 FOR IP): Performed by: INTERNAL MEDICINE

## 2023-01-13 PROCEDURE — 6370000000 HC RX 637 (ALT 250 FOR IP): Performed by: NURSE PRACTITIONER

## 2023-01-13 PROCEDURE — 94761 N-INVAS EAR/PLS OXIMETRY MLT: CPT

## 2023-01-13 PROCEDURE — 97110 THERAPEUTIC EXERCISES: CPT

## 2023-01-13 PROCEDURE — 1200000000 HC SEMI PRIVATE

## 2023-01-13 PROCEDURE — 94660 CPAP INITIATION&MGMT: CPT

## 2023-01-13 PROCEDURE — 99239 HOSP IP/OBS DSCHRG MGMT >30: CPT | Performed by: INTERNAL MEDICINE

## 2023-01-13 PROCEDURE — 94640 AIRWAY INHALATION TREATMENT: CPT

## 2023-01-13 PROCEDURE — 2700000000 HC OXYGEN THERAPY PER DAY

## 2023-01-13 PROCEDURE — 6360000002 HC RX W HCPCS: Performed by: INTERNAL MEDICINE

## 2023-01-13 RX ADMIN — IPRATROPIUM BROMIDE AND ALBUTEROL SULFATE 1 AMPULE: 2.5; .5 SOLUTION RESPIRATORY (INHALATION) at 10:30

## 2023-01-13 RX ADMIN — IPRATROPIUM BROMIDE AND ALBUTEROL SULFATE 1 AMPULE: 2.5; .5 SOLUTION RESPIRATORY (INHALATION) at 06:56

## 2023-01-13 RX ADMIN — IPRATROPIUM BROMIDE AND ALBUTEROL SULFATE 1 AMPULE: 2.5; .5 SOLUTION RESPIRATORY (INHALATION) at 19:13

## 2023-01-13 RX ADMIN — ENOXAPARIN SODIUM 40 MG: 100 INJECTION SUBCUTANEOUS at 08:23

## 2023-01-13 RX ADMIN — FLUTICASONE PROPIONATE 1 SPRAY: 50 SPRAY, METERED NASAL at 08:25

## 2023-01-13 RX ADMIN — AMLODIPINE BESYLATE 10 MG: 10 TABLET ORAL at 08:21

## 2023-01-13 RX ADMIN — GUAIFENESIN 1200 MG: 600 TABLET, EXTENDED RELEASE ORAL at 22:50

## 2023-01-13 RX ADMIN — PREDNISONE 40 MG: 20 TABLET ORAL at 08:21

## 2023-01-13 RX ADMIN — THIAMINE HCL TAB 100 MG 100 MG: 100 TAB at 08:22

## 2023-01-13 RX ADMIN — BUDESONIDE AND FORMOTEROL FUMARATE DIHYDRATE 2 PUFF: 160; 4.5 AEROSOL RESPIRATORY (INHALATION) at 07:06

## 2023-01-13 RX ADMIN — Medication 60 MG: at 22:51

## 2023-01-13 RX ADMIN — GUAIFENESIN 1200 MG: 600 TABLET, EXTENDED RELEASE ORAL at 08:21

## 2023-01-13 RX ADMIN — IPRATROPIUM BROMIDE AND ALBUTEROL SULFATE 1 AMPULE: 2.5; .5 SOLUTION RESPIRATORY (INHALATION) at 03:05

## 2023-01-13 RX ADMIN — FOLIC ACID 1 MG: 1 TABLET ORAL at 08:21

## 2023-01-13 RX ADMIN — IPRATROPIUM BROMIDE AND ALBUTEROL SULFATE 1 AMPULE: 2.5; .5 SOLUTION RESPIRATORY (INHALATION) at 15:04

## 2023-01-13 RX ADMIN — IPRATROPIUM BROMIDE AND ALBUTEROL SULFATE 1 AMPULE: 2.5; .5 SOLUTION RESPIRATORY (INHALATION) at 23:09

## 2023-01-13 RX ADMIN — Medication 60 MG: at 08:22

## 2023-01-13 NOTE — PLAN OF CARE
Problem: Discharge Planning  Goal: Discharge to home or other facility with appropriate resources  Outcome: Progressing  Flowsheets (Taken 1/13/2023 0415)  Discharge to home or other facility with appropriate resources: Identify barriers to discharge with patient and caregiver     Problem: Safety - Adult  Goal: Free from fall injury  Outcome: Progressing  Flowsheets (Taken 1/13/2023 0415)  Free From Fall Injury: Instruct family/caregiver on patient safety     Problem: Skin/Tissue Integrity  Goal: Absence of new skin breakdown  Description: 1. Monitor for areas of redness and/or skin breakdown  2. Assess vascular access sites hourly  3. Every 4-6 hours minimum:  Change oxygen saturation probe site  4. Every 4-6 hours:  If on nasal continuous positive airway pressure, respiratory therapy assess nares and determine need for appliance change or resting period.   Outcome: Progressing     Problem: ABCDS Injury Assessment  Goal: Absence of physical injury  Outcome: Progressing  Flowsheets (Taken 1/7/2023 1641 by Nichelle Adam RN)  Absence of Physical Injury: Implement safety measures based on patient assessment

## 2023-01-13 NOTE — PROGRESS NOTES
Patient declined prayer and visit; blessing given     01/13/23 [id number]   Encounter Summary   Encounter Overview/Reason  Spiritual/Emotional Needs   Service Provided For: Patient   Referral/Consult From: Vinayak   Last Encounter  01/13/23   Complexity of Encounter Low   Spiritual/Emotional needs   Type Spiritual Support   Assessment/Intervention/Outcome   Assessment Calm;Coping   Intervention Prayer (assurance of)/Rena Lara;Discussed illness injury and it’s impact   Outcome Engaged in conversation;Expressed feelings, needs, and concerns;Expressed Gratitude;Receptive

## 2023-01-13 NOTE — CARE COORDINATION
Christopher Bassett started authorization for patient 1/11. Patient can admit to facility once authorization comes back and after 1/14 due to \"COVID isolation\". Patient requires a RAPID COVID at discharge as well.     Electronically signed by MATTEO Friend on 1/13/2023 at 11:24 AM

## 2023-01-13 NOTE — PROGRESS NOTES
DARRION Berkowitz 52              Date:   1/13/2023  Patient name:  Anna Alcocer  Date of admission:  1/3/2023  5:10 PM  MRN:   [id number]  Account:  [de-identified]  YOB: 1965  PCP:    Amadeo Mcmullen MD  Room:   [facility]2739-73  Code Status:    Full Code    Chief Complaint:     Chief Complaint   Patient presents with    Respiratory Distress       History Obtained From:     patient, electronic medical record    History of Present Illness: The patient is a 61 y.o. Non- / non  male who presents with Respiratory Distress   and he is admitted to the hospital for the management of shortness of breath  Patient past medical history multiple medical problem which include hypertension, advanced COPD, chronic alcoholism, patient, was admitted with worsening shortness of breath, symptoms are going on for last few days before presentation  Patient apparently was not taking his medication  Was [facility]  Patient denying complaints of chest pain  In emergency room COVID was positive, was cleared by pulmonary medicine team for droplet plus precautions  Patient in emergency room, was found to have pH of 7.2, PCO2 was too high to be recorded, remained on BiPAP overnight, repeat PCO2 is 91    1/5   Patient, clinically doing better  Did use BiPAP last night  On intermittent BiPAP  No new complaints      1/7  Patient got transferred out of ICU this morning  Complaining of worsening cough  Not able to get phlegm out  Will give  acapella  DC fluids      1/8  Had episode of worsening shortness of breath yesterday  Was placed on BiPAP  Also had 5 beat run of V. tach  Electrolytes were normal  Patient denied any chest pain or palpitations  Currently on BiPAP  States that he is feeling slightly better than yesterday  His vital signs been stable  Patient has been afebrile.       1/9  On BiPAP  States that he is feeling the same  Denies any chest pain  Has been afebrile  Echocardiogram pending. 1/10  Patient states is feeling okay  Continues to be on IV steroids  Finished antibiotic course  Using BiPAP as needed during the day and at night  Continues to await placement. Vital stable. 1/11  No overnight issues, patient continues to be dyspneic on minimal exertion which is likely his baseline now  Using BiPAP as needed during the day and at night  Denies any chest pain otherwise tolerating p.o. well    1/12  Still feels the same  Vitals stable patient has been afebrile  Continues to use BiPAP as needed    1/13/23  Stable . Awaits placement  Uses BiPAP as needed  Past Medical History:     Past Medical History:   Diagnosis Date    COPD (chronic obstructive pulmonary disease) ([facility] [address]     Hypertension         Past Surgical History:     History reviewed. No pertinent surgical history. Medications Prior to Admission:     Prior to Admission medications    Medication Sig Start Date End Date Taking? Authorizing Provider   dextromethorphan (DELSYM) 30 MG/5ML extended release liquid Take 10 mLs by mouth every 12 hours for 10 days 1/11/23 1/21/23 Yes Marina Weiss MD   guaiFENesin (MUCINEX) 600 MG extended release tablet Take 2 tablets by mouth 2 times daily 1/11/23  Yes Marina Weiss MD   predniSONE (DELTASONE) 10 MG tablet tAKE 4 TABLETS FOR 2 DAYS , Take 3 tablets together daily for 3 days, then 2 tablets daily for 3 days, then 1 tablet daily for 3 days.  1/11/23 1/21/23 Yes Marina Weiss MD   SYMBICORT 160-4.5 MCG/ACT AERO INHALE 2 PUFFS BY MOUTH ONCE DAILY 1/5/23   Gwen Nagy MD   traZODone (DESYREL) 50 MG tablet Take 1 tablet by mouth nightly 1/5/23   Gwen Nagy MD   Zahraa Flower 18 MCG inhalation capsule Inhale 1 puff by mouth once daily 1/5/23   Gwen Nagy MD   folic acid (FOLVITE) 1 MG tablet Take 1 tablet by mouth daily 6/22/22   Ralph Shine MD   thiamine 100 MG tablet Take 1 tablet by mouth daily 6/22/22   Kyle Reeves Lucas Whitley MD   vitamin D (ERGOCALCIFEROL) 1.25 MG (37700 UT) CAPS capsule Take 1 capsule by mouth once a week 6/22/22   True Mejia MD   albuterol (PROVENTIL) (2.5 MG/3ML) 0.083% nebulizer solution Take 3 mLs by nebulization 4 times daily 6/7/22   Jean Carlos Carney DO   amLODIPine (NORVASC) 10 MG tablet Take 1 tablet by mouth daily 3/25/22   True Mejia MD        Allergies:     Patient has no known allergies. Social History:     Tobacco:    reports that he has been smoking cigarettes. He has a 43.00 pack-year smoking history. He has never used smokeless tobacco.  Alcohol:      reports current alcohol use. Drug Use:  reports that he does not currently use drugs. Family History:     History reviewed. No pertinent family history. Review of Systems:     Positive and Negative as described in HPI. CONSTITUTIONAL:  negative for fevers, chills, sweats, fatigue, weight loss  HEENT:  negative for vision, hearing changes, runny nose, throat pain  RESPIRATORY: Shortness of breath, cough  CARDIOVASCULAR:  negative for chest pain, palpitations.   Patient has pain on right side of lower chest which worse with coughing  GASTROINTESTINAL:  negative for nausea, vomiting, diarrhea, constipation, change in bowel habits, abdominal pain   GENITOURINARY:  negative for difficulty of urination, burning with urination, frequency   INTEGUMENT:  negative for rash, skin lesions, easy bruising   HEMATOLOGIC/LYMPHATIC:  negative for swelling/edema   ALLERGIC/IMMUNOLOGIC:  negative for urticaria , itching  ENDOCRINE:  negative increase in drinking, increase in urination, hot or cold intolerance  MUSCULOSKELETAL:  negative joint pains, muscle aches, swelling of joints, clubbing present   NEUROLOGICAL:  negative for headaches, dizziness, lightheadedness, numbness, pain, tingling extremities  BEHAVIOR/PSYCH:  negative for depression, anxiety    Physical Exam:   /81   Pulse 93   Temp 97.3 °F (36.3 °C)   Resp 20   Ht 5' 7.72\" (1.72 m)   Wt 137 lb 12.6 oz (62.5 kg)   SpO2 94%   BMI 21.13 kg/m²   Temp (24hrs), Av.5 °F (36.4 °C), Min:97.3 °F (36.3 °C), Max:97.7 °F (36.5 °C)    Recent Labs     23  1104   POCGLU 126*       No intake or output data in the 24 hours ending 23 1115      General Appearance:  alert, well appearing, and in no acute distress  Mental status: oriented to person, place, and time with normal affect  Head:  normocephalic, atraumatic. Eye: no icterus, redness, pupils equal and reactive, extraocular eye movements intact, conjunctiva clear  Ear: normal external ear, no discharge, hearing intact  Nose:  no drainage noted  Mouth: mucous membranes moist  Neck: supple, no carotid bruits, thyroid not palpable  Lungs: Air entry, bilateral decreased, no rales, rhonchi  Cardiovascular: normal rate, regular rhythm, no murmur, gallop, rub. Abdomen: Soft, nontender, nondistended, normal bowel sounds, no hepatomegaly or splenomegaly  Neurologic: There are no new focal motor or sensory deficits, normal muscle tone and bulk, no abnormal sensation, normal speech, cranial nerves II through XII grossly intact  Skin: No gross lesions, rashes, bruising or bleeding on exposed skin area  Extremities:  peripheral pulses palpable, no pedal edema or calf pain with palpation  Psych: normal affect     Investigations:      Laboratory Testing:  No results found for this or any previous visit (from the past 24 hour(s)).           Imaging/Diagnostics:      Assessment :      Primary Problem  COPD exacerbation [facility])    Active Hospital Problems    Diagnosis Date Noted    COPD exacerbation ([facility] [J44.1] 2022     Priority: Medium       Plan:     Patient status Admit as inpatient in the  Medical ICU    Advanced COPD,, admitted with worsening shortness of breath,  Acute hypercapnic respiratory failure, on BiPAP, steroids, DuoNeb nebulization  Hypertension, restarted home dose of Norvasc, controlled  On DVT prophylaxis Lovenox  Patient is clubbing, CT chest done in 6/22 , which was negative   Overall prognosis guarded, given advanced COPD, chronic hypoxic respiratory failure  Patient had a left renal mass, which was seen in CT scan in June of this year, need urology input as outpatient  1/5   Patient feeling better  Complaining of cough and dryness of mouth with continuous BiPAP  Started on cough medication  Much improved  Will discuss with pulmonary medicine team, likely transfer out of ICU    1/6  Transferred out of ICU  On Mucinex  Not able to expectorate  Will give Acapella  Continues to be on IV steroids  Patient will likely long-term steroids  Will order PT OT eval.    1/7  Patient had episode of NSVT likely precipitated by underlying hypoxia  Electrolytes were checked which were normal  Troponin was normal  Will check echocardiogram  Chest x-ray did yesterday was nonacute  Patient currently on BiPAP  Dose of Mucinex was increased yesterday  Advised to do Acapella to get secretions out  On antibiotics will complete total of 7 days. On IV steroids will likely need long-term steroids on discharge. CODE STATUS discussed again today patient wants to remain full code  Patient understands that he is critically sick  Will also need urology follow-up for left renal mass    1/8  No further episode of arrhythmia  Echocardiogram is pending  Patient continues to use BiPAP as needed during the day and at night  Using Acapella  On antibiotics  Dose of steroid decreased    1/9  No more episode of NSVT noted  Echocardiogram is pending  On antibiotics IV steroids  Dose of steroid decreased to 40 mg every 12  Patient using BiPAP as needed during the day at night  Overall prognosis is poor  Discussed the CODE STATUS patient wants to be full code.     1/10\  Echocardiogram showed preserved ejection fraction  Patient on 40 every 12  Completed course of cefepime  Continues to await placement    1/11  Dose of prednisone decreased yesterday  Last dose of cefepime today  Continues to await placement  Continue current dose of amlodipine for hypertension  Patient otherwise medically cleared to be discharged      1/12  Started on p.o. steroids  Finished antibiotic course  Continue amlodipine for hypertension  Patient medically cleared for discharge awaiting placement. 1/13/23  No new symptoms  Labs vital stable  Awaiting placement  Discharge order. Placed    Consultations:   IP CONSULT TO PULMONOLOGY  IP CONSULT TO PRIMARY CARE PROVIDER  IP CONSULT TO SOCIAL WORK    Patient is admitted as inpatient status because of co-morbidities listed above, severity of signs and symptoms as outlined, requirement for current medical therapies and most importantly because of direct risk to patient if care not provided in a hospital setting. Tracey Ross MD  1/13/2023  11:15 AM    Copy sent to Dr. Nyoka Hodgkins, MD    Please note that this chart was generated using voice recognition Dragon dictation software. Although every effort was made to ensure the accuracy of this automated transcription, some errors in transcription may have occurred.

## 2023-01-13 NOTE — DISCHARGE SUMMARY
[facility]    Discharge Summary     Patient ID: Colleen Tafoya  :  1965   MRN: [id number]     ACCOUNT:  [de-identified]   Patient's PCP: Lanette Soares MD  Admit Date: 1/3/2023   Discharge Date: 2023    Length of Stay: 15  Code Status:  Full Code  Admitting Physician: Quinton Wilson MD  Discharge Physician: Karen Vigil MD     Active Discharge Diagnoses:     Primary Problem  COPD exacerbation [facility])      [place] Problems    Diagnosis Date Noted    Hypercapnic respiratory failure, chronic ([facility] [address] [J96.12] 2023     Priority: Medium    COPD exacerbation ([facility] [address] [J44.1] 2022     Priority: Medium    Renal mass, left [N28.89] 2022     Priority: Medium    Chronic obstructive pulmonary disease ([facility] [address] [J44.9] 2022    Chronic respiratory failure with hypoxia [facility]) [J96.11] 2022    Benign essential HTN [I10] 2022       Admission Condition:  Poor     Discharged Condition: fair    Hospital Stay:     Hospital Course:  Colleen Tafoya is a 61 y.o. male who was admitted for the management of COPD exacerbation ([facility] [address] , presented to ER with Respiratory Distress    On admission  The patient is a 61 y.o. Non- / non  male who presents with Respiratory Distress   and he is admitted to the hospital for the management of shortness of breath  Patient past medical history multiple medical problem which include hypertension, advanced COPD, chronic alcoholism, patient, was admitted with worsening shortness of breath, symptoms are going on for last few days before presentation  Patient apparently was not taking his medication  [facility]  Patient denying complaints of chest pain  In emergency room COVID was positive, was cleared by pulmonary medicine team for droplet plus precautions      Pulm consult   1. Acute upon chronic hypoxemic and hypercapnic respiratory failure. Currently on BiPAP. 2.  Severe COPD, on Spiriva, Symbicort, Proventil  3. Tobacco abuse  4. Hypertension  5. Depression  6. Recent hospitalization at [facility] for COVID-19. Positive test December 8.--- I conferred with the lab at [facility]. 7.  Severe chronic hypercapnia. Patient's serum bicarb greater than 45   On discharge  Has chronic hypercapnic respiratory failure  Needing BiPAP at times  Especially at night  Blood pressure stable  Pulmonary condition stable  Vital stable             Significant therapeutic interventions:     Significant Diagnostic Studies:   Labs / Micro:        ,     Radiology:    XR CHEST PORTABLE    Result Date: 1/6/2023  EXAMINATION: ONE XRAY VIEW OF THE CHEST 1/6/2023 6:33 pm COMPARISON: 01/03/2023 HISTORY: Acute shortness of breath. FINDINGS: Cardiomediastinal silhouette and pulmonary vasculature are normal.  Mild emphysema. No consolidation, pleural effusion, or pneumothorax. Mild emphysema without acute abnormality. XR CHEST PORTABLE    Result Date: 1/3/2023  EXAMINATION: ONE XRAY VIEW OF THE CHEST 1/3/2023 5:10 pm COMPARISON: 06/22/2022 HISTORY: Acute shortness of breath. FINDINGS: Cardiomediastinal silhouette and pulmonary vasculature are normal.  Mild emphysema. No consolidation, pleural effusion, or pneumothorax. Negative chest radiograph. Consultations:    Consults:     Final Specialist Recommendations/Findings:   IP CONSULT TO PULMONOLOGY  IP CONSULT TO PRIMARY CARE PROVIDER  IP CONSULT TO SOCIAL WORK      The patient was seen and examined on day of discharge and this discharge summary is in conjunction with any daily progress note from day of discharge.     Discharge plan:     Disposition: Home    Physician Follow Up:     [facility]  [place] [id number]  [address], [address], [address]  [address] [facility] [address]  [phone]    Follow up in 4 week(s)  hospital f/u aecopd, prior covid in [facility], call [phone]       Requiring Further Evaluation/Follow Up POST HOSPITALIZATION/Incidental Findings:    Diet: cardiac diet    Activity: As tolerated    Instructions to Patient:     Discharge Medications:      Medication List        START taking these medications      dextromethorphan 30 MG/5ML extended release liquid  Commonly known as: DELSYM  Take 10 mLs by mouth every 12 hours for 10 days     guaiFENesin 600 MG extended release tablet  Commonly known as: MUCINEX  Take 2 tablets by mouth 2 times daily     predniSONE 10 MG tablet  Commonly known as: DELTASONE  tAKE 4 TABLETS FOR 2 DAYS , Take 3 tablets together daily for 3 days, then 2 tablets daily for 3 days, then 1 tablet daily for 3 days. CHANGE how you take these medications      Spiriva HandiHaler 18 MCG inhalation capsule  Generic drug: tiotropium  Inhale 1 puff by mouth once daily  What changed: See the new instructions. Symbicort 160-4.5 MCG/ACT Aero  Generic drug: budesonide-formoterol  INHALE 2 PUFFS BY MOUTH ONCE DAILY  What changed: See the new instructions.             CONTINUE taking these medications      albuterol (2.5 MG/3ML) 0.083% nebulizer solution  Commonly known as: PROVENTIL  Take 3 mLs by nebulization 4 times daily     amLODIPine 10 MG tablet  Commonly known as: NORVASC  Take 1 tablet by mouth daily     folic acid 1 MG tablet  Commonly known as: FOLVITE  Take 1 tablet by mouth daily     thiamine 100 MG tablet  Take 1 tablet by mouth daily     traZODone 50 MG tablet  Commonly known as: DESYREL  Take 1 tablet by mouth nightly     vitamin D 1.25 MG (85054 UT) Caps capsule  Commonly known as: ERGOCALCIFEROL  Take 1 capsule by mouth once a week            STOP taking these medications      escitalopram 10 MG tablet  Commonly known as: LEXAPRO               Where to Get Your Medications        These medications were sent to [facility] [phone] [address]      Phone: [phone]   dextromethorphan 30 MG/5ML extended release liquid  guaiFENesin 600 MG extended release tablet  predniSONE 10 MG tablet  Spiriva HandiHaler 18 MCG inhalation capsule  Symbicort 160-4.5 MCG/ACT Aero  traZODone 50 MG tablet         Time Spent on discharge is  35 mins in patient examination, evaluation, counseling as well as medication reconciliation, prescriptions for required medications, discharge plan and follow up. Electronically signed by   Petey Ferrara MD  1/18/2023  2:03 PM      Thank you Dr. Arleth Cordova MD for the opportunity to be involved in this patient's care.

## 2023-01-13 NOTE — PROGRESS NOTES
Primitivo Hurd MD/Mukul Miranda MD/ Leola Parkinson MD/Don Coffman APRN AGACNP-BC, NP-C      Almeta Galina APRN NP-C     Suyapa Fuchs APRN NP-C                                           Pulmonary Progress Note    Patient - Pat Feliciano   Age - 61 y.o.   - 1965  MRN - [id number]  Acct # - [de-identified]  Date of Admission - 1/3/2023  5:10 PM    Consulting Service/Physician:       Primary Care Physician: Carroll Shah MD    SUBJECTIVE:     Chief Complaint:   Chief Complaint   Patient presents with    Respiratory Distress     Subjective:    No acute issues overnight, he only tolerates the BiPAP for very short periods. He was sleeping when I entered the room, awaiting extended-care facility likely will be discharged tomorrow to [facility]. VITALS  /81   Pulse 76   Temp 97.3 °F (36.3 °C)   Resp 18   Ht 5' 7.72\" (1.72 m)   Wt 137 lb 12.6 oz (62.5 kg)   SpO2 100%   BMI 21.13 kg/m²   Wt Readings from Last 3 Encounters:   23 137 lb 12.6 oz (62.5 kg)   22 154 lb 1.6 oz (69.9 kg)   22 140 lb 6.4 oz (63.7 kg)     I/O (24 Hours)  No intake or output data in the 24 hours ending 23 0945    Ventilator:   Settings  FiO2 : 40 %  Insp Rise Time (%): 1 %  Exam:   Physical Exam   Constitutional: Sleeping, not in any distress  HENT: Unremarkable, nasal cannula in place  Head: Normocephalic and atraumatic. Eyes: Eyes closed  Cardiovascular:  Regular rate and rhythm. Normal heart tones. No JVD. Pulmonary/Chest: Effort normal and breath sounds diminished, no active wheezing while asleep, respirations easy nonlabored on 5 L nasal cannula  Abdominal: Soft. Neurological:patient is sleeping, I did not assess  Skin: Skin is warm and dry.   Extremities: No edema   Infusions:      sodium chloride       Meds:     Current Facility-Administered Medications:     predniSONE (DELTASONE) tablet 40 mg, 40 mg, Oral, Daily, Meghana Rouse, APRN - CNP, 40 mg at 01/13/23 0821    fluticasone (FLONASE) 50 MCG/ACT nasal spray 1 spray, 1 spray, Each Nostril, Daily, Berta Crane MD, 1 spray at 01/13/23 0825    perflutren lipid microspheres (DEFINITY) injection 1.5 mL, 1.5 mL, IntraVENous, ONCE PRN, Valerie Lal MD    phenol 1.4 % mouth spray 1 spray, 1 spray, Mouth/Throat, Q2H PRN, Eugenie Ramos MD    guaiFENesin [facility]) extended release tablet 1,200 mg, 1,200 mg, Oral, BID, Eugenie Ramos MD, 1,200 mg at 01/13/23 5491    dextromethorphan (DELSYM) 30 MG/5ML extended release liquid 60 mg, 60 mg, Oral, 2 times per day, Eugenie Ramos MD, 60 mg at 01/13/23 5739    ipratropium-albuterol (DUONEB) nebulizer solution 1 ampule, 1 ampule, Inhalation, Q4H, Eugenie Ramos MD, 1 ampule at 01/13/23 0656    ipratropium-albuterol (DUONEB) nebulizer solution 1 ampule, 1 ampule, Inhalation, Q4H PRN, Eugenie Ramos MD    enoxaparin (LOVENOX) injection 40 mg, 40 mg, SubCUTAneous, Daily, Ramona Barrientos MD, 40 mg at 01/13/23 1580    amLODIPine (NORVASC) tablet 10 mg, 10 mg, Oral, Daily, Sherron Setting, APRN - NP, 10 mg at 28/69/88 4924    folic acid (FOLVITE) tablet 1 mg, 1 mg, Oral, Daily, Sherron Setting, APRN - NP, 1 mg at 01/13/23 1992    budesonide-formoterol (SYMBICORT) 160-4.5 MCG/ACT inhaler 2 puff, 2 puff, Inhalation, Daily, Sherron Setting, APRN - NP, 2 puff at 01/13/23 3661    thiamine mononitrate tablet 100 mg, 100 mg, Oral, Daily, Sherron Setting, APRN - NP, 100 mg at 01/13/23 6305    sodium chloride flush 0.9 % injection 5-40 mL, 5-40 mL, IntraVENous, 2 times per day, Sherron Setting, APRN - NP    sodium chloride flush 0.9 % injection 5-40 mL, 5-40 mL, IntraVENous, PRN, Jennifer Neal, APRN - NP    0.9 % sodium chloride infusion, , IntraVENous, PRN, Sherron Setting, APRN - NP    ondansetron (ZOFRAN-ODT) disintegrating tablet 4 mg, 4 mg, Oral, Q8H PRN **OR** ondansetron (ZOFRAN) injection 4 mg, 4 mg, IntraVENous, Q6H PRN, Butchrye Deck, APRN - NP    polyethylene glycol (GLYCOLAX) packet 17 g, 17 g, Oral, Daily PRN, Jerrye Deck, APRN - NP    acetaminophen (TYLENOL) tablet 650 mg, 650 mg, Oral, Q6H PRN **OR** acetaminophen (TYLENOL) suppository 650 mg, 650 mg, Rectal, Q6H PRN, Jerrye Deck, APRN - NP    Lab Results:     Lab Results   Component Value Date    WBC 8.5 01/11/2023    HGB 10.7 (L) 01/11/2023    HCT 32.5 (L) 01/11/2023    MCV 93.8 01/11/2023     01/11/2023     Lab Results   Component Value Date    CALCIUM 8.6 01/11/2023     (L) 01/11/2023    K 4.9 01/11/2023    CO2 45 (HH) 01/11/2023    CL 88 (L) 01/11/2023    BUN 19 01/11/2023    CREATININE 0.40 (L) 01/11/2023       Radiology:   No new chest imaging    ASSESSMENT:       Acute on chronic hypoxemic respiratory failure baseline 5L, currently at his baseline  Acute on chronic hypercapnic respiratory failure-not very compliant with BiPAP, bicarb 45 on CHEM profile  COPD exacerbation-slowly improving  History of severe COPD; maintained on Spiriva, Symbicort Proventil- follows with Dr. Keisha Haley  Recent COVID-19 infection in December at [facility], tested positive on 12/8/2022, greater than 30 days ago  Tobacco abuse  Hypertension  Full code  PLAN:   Completed antibiotics  Continue with prednisone taper, agree would go down slowly by 10 mg every 3 days, will decrease to 30 mg tomorrow  Increase activity as tolerated  Encourage BiPAP at at bedtime and with naps, not tolerating for very long periods  Encourage smoking cessation, after discharge  Continue with bronchodilator therapy  [facility] for noninvasive ventilator they will follow-up when he was at the facility  Recommend BiPAP at night at the facility  No objection to discharge anytime from our standpoint, plan for discharge tomorrow to [facility]  Would recommend pulmonary follow-up after discharge from facility in 4 to 6 weeks, [phone]  We will follow intermittently over the weekend, please call if any questions or concerns  Discussed with RN      Electronically signed by ERICA Terry CNP on 01/13/23     This progress note was completed using a voice transcription system. Every effort was made to ensure accuracy. However, inadvertent computerized transcription errors may be present.     MAE ESCUDERO-BC, NP-C  [facility] Pulmonary, Critical Care & Sleep

## 2023-01-13 NOTE — PROGRESS NOTES
Physical Therapy  Facility/Department: [facility] MED SURG  Daily Treatment Note  NAME: Sonja Mueller  : 1965  MRN: [id number]    Date of Service: 2023    Discharge Recommendations: Other (comment) (patient may benefit from pulmonary rehab)      Patient Diagnosis(es): The primary encounter diagnosis was Acute on chronic respiratory failure with hypoxia and hypercapnia ([address] A diagnosis of Pneumonia due to COVID-19 virus was also pertinent to this visit. Assessment   Activity Tolerance: Patient limited by endurance     Plan    Physcial Therapy Plan  General Plan: 3-5 times per week  Current Treatment Recommendations: Functional mobility training;Gait training; Therapeutic activities; Endurance training     Restrictions  Restrictions/Precautions  Restrictions/Precautions: Contact Precautions, General Precautions (5L O2, needed placed back on Bipap 2* SpO2 86% with EOB activity, anxious)  Required Braces or Orthoses?: No     Subjective    Subjective  Subjective: The patient reports having a bad night and was not able to tolerate much. Pain: Pt denies pain. Orientation  Overall Orientation Status: Within Functional Limits     Objective   Vitals  SpO2: 90 %  O2 Device: Nasal cannula  Comment: fluctuating SpO2 between 87-92% on O2 via NC with LE exercises; pt requested BiPap - RN notified and planned to don BiPap when PT left room  Bed Mobility Training  Bed Mobility Training: No (sitting EOB upon arrival)     PT Exercises  Exercise Treatment: AROM while EOB: marches, LAQs, ankle pumps x 10 reps each bilaterally.     Goals  Short Term Goals  Time Frame for Short Term Goals: 4-5 days  Short Term Goal 1: mod-I bed mobility  Short Term Goal 2: mod-I transfers  Short Term Goal 3: ambulate with SBA and O2 x 40-50ft  Short Term Goal 4: pt able to tolerate 20-25min og therapeutic activity/exercises without BiPap    Therapy Time   Individual Concurrent Group Co-treatment   Time In 7150         Time Out 5024 Minutes 8         Timed Code Treatment Minutes: 8 Minutes       Rakan Pratt, PT

## 2023-01-14 PROCEDURE — 94660 CPAP INITIATION&MGMT: CPT

## 2023-01-14 PROCEDURE — 6360000002 HC RX W HCPCS: Performed by: INTERNAL MEDICINE

## 2023-01-14 PROCEDURE — 6370000000 HC RX 637 (ALT 250 FOR IP): Performed by: INTERNAL MEDICINE

## 2023-01-14 PROCEDURE — 94761 N-INVAS EAR/PLS OXIMETRY MLT: CPT

## 2023-01-14 PROCEDURE — 94640 AIRWAY INHALATION TREATMENT: CPT

## 2023-01-14 PROCEDURE — 99231 SBSQ HOSP IP/OBS SF/LOW 25: CPT | Performed by: INTERNAL MEDICINE

## 2023-01-14 PROCEDURE — 6370000000 HC RX 637 (ALT 250 FOR IP): Performed by: NURSE PRACTITIONER

## 2023-01-14 PROCEDURE — 1200000000 HC SEMI PRIVATE

## 2023-01-14 PROCEDURE — 2700000000 HC OXYGEN THERAPY PER DAY

## 2023-01-14 RX ADMIN — IPRATROPIUM BROMIDE AND ALBUTEROL SULFATE 1 AMPULE: 2.5; .5 SOLUTION RESPIRATORY (INHALATION) at 10:44

## 2023-01-14 RX ADMIN — FOLIC ACID 1 MG: 1 TABLET ORAL at 08:56

## 2023-01-14 RX ADMIN — Medication 60 MG: at 19:45

## 2023-01-14 RX ADMIN — GUAIFENESIN 1200 MG: 600 TABLET, EXTENDED RELEASE ORAL at 19:45

## 2023-01-14 RX ADMIN — GUAIFENESIN 1200 MG: 600 TABLET, EXTENDED RELEASE ORAL at 08:56

## 2023-01-14 RX ADMIN — BUDESONIDE AND FORMOTEROL FUMARATE DIHYDRATE 2 PUFF: 160; 4.5 AEROSOL RESPIRATORY (INHALATION) at 07:02

## 2023-01-14 RX ADMIN — IPRATROPIUM BROMIDE AND ALBUTEROL SULFATE 1 AMPULE: 2.5; .5 SOLUTION RESPIRATORY (INHALATION) at 19:48

## 2023-01-14 RX ADMIN — Medication 60 MG: at 08:56

## 2023-01-14 RX ADMIN — AMLODIPINE BESYLATE 10 MG: 10 TABLET ORAL at 08:56

## 2023-01-14 RX ADMIN — PREDNISONE 40 MG: 20 TABLET ORAL at 08:56

## 2023-01-14 RX ADMIN — ENOXAPARIN SODIUM 40 MG: 100 INJECTION SUBCUTANEOUS at 08:56

## 2023-01-14 RX ADMIN — IPRATROPIUM BROMIDE AND ALBUTEROL SULFATE 1 AMPULE: 2.5; .5 SOLUTION RESPIRATORY (INHALATION) at 07:02

## 2023-01-14 RX ADMIN — IPRATROPIUM BROMIDE AND ALBUTEROL SULFATE 1 AMPULE: 2.5; .5 SOLUTION RESPIRATORY (INHALATION) at 23:04

## 2023-01-14 RX ADMIN — IPRATROPIUM BROMIDE AND ALBUTEROL SULFATE 1 AMPULE: 2.5; .5 SOLUTION RESPIRATORY (INHALATION) at 15:06

## 2023-01-14 RX ADMIN — THIAMINE HCL TAB 100 MG 100 MG: 100 TAB at 08:56

## 2023-01-14 RX ADMIN — FLUTICASONE PROPIONATE 1 SPRAY: 50 SPRAY, METERED NASAL at 08:57

## 2023-01-14 NOTE — PROGRESS NOTES
[address]              Date:   1/14/2023  Patient name:  Sebastien Munoz  Date of admission:  1/3/2023  5:10 PM  MRN:   [id number]  Account:  [de-identified]  YOB: 1965  PCP:    Zoë Rosales MD  Room:   705705-67  Code Status:    Full Code    Chief Complaint:     Chief Complaint   Patient presents with    Respiratory Distress       History Obtained From:     patient, electronic medical record    History of Present Illness: The patient is a 61 y.o. Non- / non  male who presents with Respiratory Distress   and he is admitted to the hospital for the management of shortness of breath  Patient past medical history multiple medical problem which include hypertension, advanced COPD, chronic alcoholism, patient, was admitted with worsening shortness of breath, symptoms are going on for last few days before presentation  Patient apparently was not taking his medication  Was admitted at [facility] with [facility]  Patient denying complaints of chest pain  In emergency room COVID was positive, was cleared by pulmonary medicine team for droplet plus precautions  Patient in emergency room, was found to have pH of 7.2, PCO2 was too high to be recorded, remained on BiPAP overnight, repeat PCO2 is 91    1/5   Patient, clinically doing better  Did use BiPAP last night  On intermittent BiPAP  No new complaints      1/7  Patient got transferred out of ICU this morning  Complaining of worsening cough  Not able to get phlegm out  Will give  acapella  DC fluids      1/8  Had episode of worsening shortness of breath yesterday  Was placed on BiPAP  Also had 5 beat run of V. tach  Electrolytes were normal  Patient denied any chest pain or palpitations  Currently on BiPAP  States that he is feeling slightly better than yesterday  His vital signs been stable  Patient has been afebrile.       1/9  On BiPAP  States that he is feeling the same  Denies any chest pain  Has been afebrile  Echocardiogram pending. 1/10  Patient states is feeling okay  Continues to be on IV steroids  Finished antibiotic course  Using BiPAP as needed during the day and at night  Continues to await placement. Vital stable. 1/11  No overnight issues, patient continues to be dyspneic on minimal exertion which is likely his baseline now  Using BiPAP as needed during the day and at night  Denies any chest pain otherwise tolerating p.o. well    1/12  Still feels the same  Vitals stable patient has been afebrile  Continues to use BiPAP as needed    1/13/23  Stable . Awaits placement  Uses BiPAP as needed  Past Medical History:     Past Medical History:   Diagnosis Date    COPD (chronic obstructive pulmonary disease) ([facility] [address]     Hypertension         Past Surgical History:     History reviewed. No pertinent surgical history. Medications Prior to Admission:     Prior to Admission medications    Medication Sig Start Date End Date Taking? Authorizing Provider   dextromethorphan (DELSYM) 30 MG/5ML extended release liquid Take 10 mLs by mouth every 12 hours for 10 days 1/11/23 1/21/23 Yes Roberta Gupta MD   guaiFENesin (MUCINEX) 600 MG extended release tablet Take 2 tablets by mouth 2 times daily 1/11/23  Yes Roberta Gupta MD   predniSONE (DELTASONE) 10 MG tablet tAKE 4 TABLETS FOR 2 DAYS , Take 3 tablets together daily for 3 days, then 2 tablets daily for 3 days, then 1 tablet daily for 3 days.  1/11/23 1/21/23 Yes Roberta Gupta MD   SYMBICORT 160-4.5 MCG/ACT AERO INHALE 2 PUFFS BY MOUTH ONCE DAILY 1/5/23   Melani Saenz MD   traZODone (DESYREL) 50 MG tablet Take 1 tablet by mouth nightly 1/5/23   MD Tasneem Gibbs Killings 18 MCG inhalation capsule Inhale 1 puff by mouth once daily 1/5/23   Melani Saenz MD   folic acid (FOLVITE) 1 MG tablet Take 1 tablet by mouth daily 6/22/22   Justo Miller MD   thiamine 100 MG tablet Take 1 tablet by mouth daily 6/22/22   Mega Kuhn Sadiq Sinclair MD   vitamin D (ERGOCALCIFEROL) 1.25 MG (25848 UT) CAPS capsule Take 1 capsule by mouth once a week 6/22/22   Miladys Mercado MD   albuterol (PROVENTIL) (2.5 MG/3ML) 0.083% nebulizer solution Take 3 mLs by nebulization 4 times daily 6/7/22   Ramon Mon DO   amLODIPine (NORVASC) 10 MG tablet Take 1 tablet by mouth daily 3/25/22   Miladys Mercado MD        Allergies:     Patient has no known allergies. Social History:     Tobacco:    reports that he has been smoking cigarettes. He has a 43.00 pack-year smoking history. He has never used smokeless tobacco.  Alcohol:      reports current alcohol use. Drug Use:  reports that he does not currently use drugs. Family History:     History reviewed. No pertinent family history. Review of Systems:     Positive and Negative as described in HPI. CONSTITUTIONAL:  negative for fevers, chills, sweats, fatigue, weight loss  HEENT:  negative for vision, hearing changes, runny nose, throat pain  RESPIRATORY: Shortness of breath, cough  CARDIOVASCULAR:  negative for chest pain, palpitations.   Patient has pain on right side of lower chest which worse with coughing  GASTROINTESTINAL:  negative for nausea, vomiting, diarrhea, constipation, change in bowel habits, abdominal pain   GENITOURINARY:  negative for difficulty of urination, burning with urination, frequency   INTEGUMENT:  negative for rash, skin lesions, easy bruising   HEMATOLOGIC/LYMPHATIC:  negative for swelling/edema   ALLERGIC/IMMUNOLOGIC:  negative for urticaria , itching  ENDOCRINE:  negative increase in drinking, increase in urination, hot or cold intolerance  MUSCULOSKELETAL:  negative joint pains, muscle aches, swelling of joints, clubbing present   NEUROLOGICAL:  negative for headaches, dizziness, lightheadedness, numbness, pain, tingling extremities  BEHAVIOR/PSYCH:  negative for depression, anxiety    Physical Exam:   /68   Pulse 98   Temp 98.6 °F (37 °C) (Oral)   Resp 20   Ht 5' 7.72\" (1.72 m)   Wt 137 lb 12.6 oz (62.5 kg)   SpO2 96%   BMI 21.13 kg/m²   Temp (24hrs), Av.3 °F (36.8 °C), Min:97.9 °F (36.6 °C), Max:98.6 °F (37 °C)    Recent Labs     23  1104   POCGLU 126*         Intake/Output Summary (Last 24 hours) at 2023 1007  Last data filed at 2023 0699  Gross per 24 hour   Intake 250 ml   Output --   Net 250 ml         General Appearance:  alert, well appearing, and in no acute distress  Mental status: oriented to person, place, and time with normal affect  Head:  normocephalic, atraumatic. Eye: no icterus, redness, pupils equal and reactive, extraocular eye movements intact, conjunctiva clear  Ear: normal external ear, no discharge, hearing intact  Nose:  no drainage noted  Mouth: mucous membranes moist  Neck: supple, no carotid bruits, thyroid not palpable  Lungs: Air entry, bilateral decreased, no rales, rhonchi  Cardiovascular: normal rate, regular rhythm, no murmur, gallop, rub. Abdomen: Soft, nontender, nondistended, normal bowel sounds, no hepatomegaly or splenomegaly  Neurologic: There are no new focal motor or sensory deficits, normal muscle tone and bulk, no abnormal sensation, normal speech, cranial nerves II through XII grossly intact  Skin: No gross lesions, rashes, bruising or bleeding on exposed skin area  Extremities:  peripheral pulses palpable, no pedal edema or calf pain with palpation  Psych: normal affect     Investigations:      Laboratory Testing:  No results found for this or any previous visit (from the past 24 hour(s)).           Imaging/Diagnostics:      Assessment :      Primary Problem  COPD exacerbation [facility])    Active Hospital Problems    Diagnosis Date Noted    Hypercapnic respiratory failure, chronic ([facility] [address] [J96.12] 2023     Priority: Medium    COPD exacerbation ([facility] [address] [J44.1] 2022     Priority: Medium    Renal mass, left [N28.89] 2022     Priority: Medium    Chronic obstructive pulmonary disease ([address] [J44.9] 01/28/2022    Chronic respiratory failure with hypoxia [facility]) [J96.11] 01/28/2022    Benign essential HTN [I10] 01/28/2022       Plan:     Patient status Admit as inpatient in the  Medical ICU    Advanced COPD,, admitted with worsening shortness of breath,  Acute hypercapnic respiratory failure, on BiPAP, steroids, DuoNeb nebulization  Hypertension, restarted home dose of Norvasc, controlled  On DVT prophylaxis Lovenox  Patient is clubbing, CT chest done in 6/22 , which was negative   Overall prognosis guarded, given advanced COPD, chronic hypoxic respiratory failure  Patient had a left renal mass, which was seen in CT scan in June of this year, need urology input as outpatient  1/5   Patient feeling better  Complaining of cough and dryness of mouth with continuous BiPAP  Started on cough medication  Much improved  Will discuss with pulmonary medicine team, likely transfer out of ICU    1/6  Transferred out of ICU  On Mucinex  Not able to expectorate  Will give Acapella  Continues to be on IV steroids  Patient will likely long-term steroids  Will order PT OT eval.    1/7  Patient had episode of NSVT likely precipitated by underlying hypoxia  Electrolytes were checked which were normal  Troponin was normal  Will check echocardiogram  Chest x-ray did yesterday was nonacute  Patient currently on BiPAP  Dose of Mucinex was increased yesterday  Advised to do Acapella to get secretions out  On antibiotics will complete total of 7 days. On IV steroids will likely need long-term steroids on discharge.   CODE STATUS discussed again today patient wants to remain full code  Patient understands that he is critically sick  Will also need urology follow-up for left renal mass    1/8  No further episode of arrhythmia  Echocardiogram is pending  Patient continues to use BiPAP as needed during the day and at night  Using Acapella  On antibiotics  Dose of steroid decreased    1/9  No more episode of NSVT noted  Echocardiogram is pending  On antibiotics IV steroids  Dose of steroid decreased to 40 mg every 12  Patient using BiPAP as needed during the day at night  Overall prognosis is poor  Discussed the CODE STATUS patient wants to be full code. 1/10\  Echocardiogram showed preserved ejection fraction  Patient on 40 every 12  Completed course of cefepime  Continues to await placement    1/11  Dose of prednisone decreased yesterday  Last dose of cefepime today  Continues to await placement  Continue current dose of amlodipine for hypertension  Patient otherwise medically cleared to be discharged      1/12  Started on p.o. steroids  Finished antibiotic course  Continue amlodipine for hypertension  Patient medically cleared for discharge awaiting placement. 1/13/23  No new symptoms  Labs vital stable  Awaiting placement  Discharge order. Placed      1/14/23    No new symptoms  Vitals stable . Cardiopulmonary stable . Continue current rx ,       Leaving at 5 pm for [place], [place]          Consultations:   IP CONSULT TO PULMONOLOGY  IP CONSULT TO PRIMARY CARE PROVIDER  IP CONSULT TO SOCIAL WORK    Patient is admitted as inpatient status because of co-morbidities listed above, severity of signs and symptoms as outlined, requirement for current medical therapies and most importantly because of direct risk to patient if care not provided in a hospital setting. Petey Ferrara MD  1/14/2023  10:07 AM    Copy sent to Dr. Arleth Cordova MD    Please note that this chart was generated using voice recognition Dragon dictation software. Although every effort was made to ensure the accuracy of this automated transcription, some errors in transcription may have occurred.

## 2023-01-14 NOTE — PROGRESS NOTES
Primitivo Hurd MD/Karl Elmer File MD Unknown Saint MD/ Cordelia King MD/Don ESCUDERO-BC, NP-C      Herb MALDONADO NP-C     Chad MALDONADO NP-C                                           Pulmonary Progress Note    Patient - Lubna Thakkar   Age - 61 y.o.   - 1965  MRN - [id number]  Acct # - [de-identified]  Date of Admission - 1/3/2023  5:10 PM    Consulting Service/Physician:       Primary Care Physician: Apolinar Mercer MD    SUBJECTIVE:     Chief Complaint:   Chief Complaint   Patient presents with    Respiratory Distress     Subjective:    He still gets short of breath especially with activity, he does have some wheezing. He denies any productive phlegm. He has not had any fevers. Using BiPAP at night and with naps. Still awaiting pre-CERT to [facility]. VITALS  /68   Pulse 97   Temp 98.6 °F (37 °C) (Oral)   Resp 16   Ht 5' 7.72\" (1.72 m)   Wt 137 lb 12.6 oz (62.5 kg)   SpO2 100%   BMI 21.13 kg/m²   Wt Readings from Last 3 Encounters:   23 137 lb 12.6 oz (62.5 kg)   22 154 lb 1.6 oz (69.9 kg)   22 140 lb 6.4 oz (63.7 kg)     I/O (24 Hours)    Intake/Output Summary (Last 24 hours) at 2023 1553  Last data filed at 2023 1230  Gross per 24 hour   Intake 500 ml   Output --   Net 500 ml       Ventilator:   Settings  FiO2 : 40 %  Insp Rise Time (%): 1 %  Exam:   Physical Exam   Constitutional: Alert, oriented, lying bed no apparent distress  HENT: Unremarkable, nasal cannula in place  Head: Normocephalic and atraumatic. Eyes: PERRLA  Cardiovascular:  Regular rate and rhythm. Normal heart tones. No JVD. Pulmonary/Chest: Effort normal and breath sounds diminished, no active wheezing while asleep, respirations easy nonlabored on 4 L nasal cannula  Neurological:patient is alert, oriented and appropriate  Skin: Skin is warm and dry.   Extremities: No edema   Infusions:      sodium chloride       Meds:     Current Facility-Administered Medications:     predniSONE (DELTASONE) tablet 40 mg, 40 mg, Oral, Daily, Barabara Patches, APRN - CNP, 40 mg at 01/14/23 0856    fluticasone (FLONASE) 50 MCG/ACT nasal spray 1 spray, 1 spray, Each Nostril, Daily, Airam Lam MD, 1 spray at 01/14/23 0857    perflutren lipid microspheres (DEFINITY) injection 1.5 mL, 1.5 mL, IntraVENous, ONCE PRN, Melida Young MD    phenol 1.4 % mouth spray 1 spray, 1 spray, Mouth/Throat, Q2H PRN, Yogi Richard MD    guaiFENesin [facility]) extended release tablet 1,200 mg, 1,200 mg, Oral, BID, Yogi Richard MD, 1,200 mg at 01/14/23 0856    dextromethorphan (DELSYM) 30 MG/5ML extended release liquid 60 mg, 60 mg, Oral, 2 times per day, Yogi Richard MD, 60 mg at 01/14/23 0856    ipratropium-albuterol (DUONEB) nebulizer solution 1 ampule, 1 ampule, Inhalation, Q4H, Yogi Richard MD, 1 ampule at 01/14/23 1506    ipratropium-albuterol (DUONEB) nebulizer solution 1 ampule, 1 ampule, Inhalation, Q4H PRN, Yogi Richard MD    enoxaparin (LOVENOX) injection 40 mg, 40 mg, SubCUTAneous, Daily, Kristina Foster MD, 40 mg at 01/14/23 0856    amLODIPine (NORVASC) tablet 10 mg, 10 mg, Oral, Daily, ERICA Ponce - NP, 10 mg at 13/81/08 9255    folic acid (FOLVITE) tablet 1 mg, 1 mg, Oral, Daily, ERICA Ponce - NP, 1 mg at 01/14/23 0856    budesonide-formoterol (SYMBICORT) 160-4.5 MCG/ACT inhaler 2 puff, 2 puff, Inhalation, Daily, ERICA Ponce - NP, 2 puff at 01/14/23 3422    thiamine mononitrate tablet 100 mg, 100 mg, Oral, Daily, ERICA Ponce NP, 100 mg at 01/14/23 0856    sodium chloride flush 0.9 % injection 5-40 mL, 5-40 mL, IntraVENous, 2 times per day, ERICA Ponce - NP    sodium chloride flush 0.9 % injection 5-40 mL, 5-40 mL, IntraVENous, PRN, ERICA Ordoñez - NP    0.9 % sodium chloride infusion, , IntraVENous, PRN, ERICA Ponce - NP    ondansetron (ZOFRAN-ODT) disintegrating tablet 4 mg, 4 mg, Oral, Q8H PRN **OR** ondansetron (ZOFRAN) injection 4 mg, 4 mg, IntraVENous, Q6H PRN, Gwyndolyn Tank, APRN - NP    polyethylene glycol (GLYCOLAX) packet 17 g, 17 g, Oral, Daily PRN, Gwyndolyn Tank, APRN - NP    acetaminophen (TYLENOL) tablet 650 mg, 650 mg, Oral, Q6H PRN **OR** acetaminophen (TYLENOL) suppository 650 mg, 650 mg, Rectal, Q6H PRN, Gwyndolyn Tank, APRN - NP    Lab Results:     Lab Results   Component Value Date    WBC 8.5 01/11/2023    HGB 10.7 (L) 01/11/2023    HCT 32.5 (L) 01/11/2023    MCV 93.8 01/11/2023     01/11/2023     Lab Results   Component Value Date    CALCIUM 8.6 01/11/2023     (L) 01/11/2023    K 4.9 01/11/2023    CO2 45 (HH) 01/11/2023    CL 88 (L) 01/11/2023    BUN 19 01/11/2023    CREATININE 0.40 (L) 01/11/2023       Radiology:   No new chest imaging    ASSESSMENT:       Acute on chronic hypoxemic respiratory failure baseline 5L, currently at his baseline  Acute on chronic hypercapnic respiratory failure-not very compliant with BiPAP, bicarb 45 on CHEM profile  COPD exacerbation-slowly improving  History of severe COPD; maintained on Spiriva, Symbicort Proventil- follows with Dr. Shannon No  Recent COVID-19 infection in December at [facility], tested positive on 12/8/2022, greater than 30 days ago  Tobacco abuse  Hypertension  Full code  PLAN:   Completed antibiotics  Continue with prednisone taper, agree would go down slowly by 10 mg every 3 days, will decrease to 30 mg tomorrow  Increase activity as tolerated  Encourage BiPAP at at bedtime and with naps, not tolerating for very long periods  Encourage smoking cessation, after discharge  Continue with bronchodilator therapy  Referral was made to [employer] for noninvasive ventilator they will follow-up when he is at the facility  Recommend BiPAP at night at the facility  No objection to discharge anytime from our standpoint, plan for discharge to [facility] once pre-CERT has been obtained  Would recommend pulmonary follow-up after discharge from facility in 4 to 6 weeks, [phone]  We will follow intermittently over the weekend, please call if any questions or concerns  Discussed with RN      Electronically signed by ERICA Love CNP on 01/14/23     This progress note was completed using a voice transcription system. Every effort was made to ensure accuracy. However, inadvertent computerized transcription errors may be present.     MAE ESCUDERO-BC, NP-C  [facility] Pulmonary, Critical Care & Sleep

## 2023-01-14 NOTE — PLAN OF CARE
Problem: Discharge Planning  Goal: Discharge to home or other facility with appropriate resources  1/14/2023 1030 by Tyrone Elkins RN  Outcome: Progressing  Flowsheets (Taken 1/14/2023 9808)  Discharge to home or other facility with appropriate resources: Identify barriers to discharge with patient and caregiver   Pt to discharge to facility once medically stable. Problem: Safety - Adult  Goal: Free from fall injury  1/14/2023 1030 by Tyrone Elkins RN  Outcome: Progressing  Flowsheets (Taken 1/14/2023 0902)  Free From Fall Injury: Instruct family/caregiver on patient safety   No injury noted this shift. Problem: Skin/Tissue Integrity  Goal: Absence of new skin breakdown  Description: 1. Monitor for areas of redness and/or skin breakdown  2. Assess vascular access sites hourly  3. Every 4-6 hours minimum:  Change oxygen saturation probe site  4. Every 4-6 hours:  If on nasal continuous positive airway pressure, respiratory therapy assess nares and determine need for appliance change or resting period. 1/14/2023 1030 by Tyrone Elkins RN  Outcome: Progressing   No new skin breakdown noted this shift. Problem: ABCDS Injury Assessment  Goal: Absence of physical injury  1/14/2023 1030 by Tyrone Elkins RN  Outcome: Progressing  Flowsheets (Taken 1/14/2023 0902)  Absence of Physical Injury: Implement safety measures based on patient assessment  1/14/2023 0804 by Madeleine Magaña RN  Outcome: Progressing   No injury noted this shift.

## 2023-01-14 NOTE — PROGRESS NOTES
DARRION Berk[address] 52              Date:   1/14/2023  Patient name:  Anna Alcocer  Date of admission:  1/3/2023  5:10 PM  MRN:   [id number]  Account:  [de-identified]  YOB: 1965  PCP:    Amadeo Mcmullen MD  Room:   [facility]5802-  Code Status:    Full Code    Chief Complaint:     Chief Complaint   Patient presents with    Respiratory Distress       History Obtained From:     patient, electronic medical record    History of Present Illness: The patient is a 61 y.o. Non- / non  male who presents with Respiratory Distress   and he is admitted to the hospital for the management of shortness of breath  Patient past medical history multiple medical problem which include hypertension, advanced COPD, chronic alcoholism, patient, was admitted with worsening shortness of breath, symptoms are going on for last few days before presentation  Patient apparently was not taking his medication  Was [facility] at [facility]  Patient denying complaints of chest pain  In emergency room COVID was positive, was cleared by pulmonary medicine team for droplet plus precautions  Patient in emergency room, was found to have pH of 7.2, PCO2 was too high to be recorded, remained on BiPAP overnight, repeat PCO2 is 91    1/5   Patient, clinically doing better  Did use BiPAP last night  On intermittent BiPAP  No new complaints      1/7  Patient got transferred out of ICU this morning  Complaining of worsening cough  Not able to get phlegm out  Will give  acapella  DC fluids      1/8  Had episode of worsening shortness of breath yesterday  Was placed on BiPAP  Also had 5 beat run of V. tach  Electrolytes were normal  Patient denied any chest pain or palpitations  Currently on BiPAP  States that he is feeling slightly better than yesterday  His vital signs been stable  Patient has been afebrile.       1/9  On BiPAP  States that he is feeling the same  Denies any chest pain  Has been afebrile  Echocardiogram pending. 1/10  Patient states is feeling okay  Continues to be on IV steroids  Finished antibiotic course  Using BiPAP as needed during the day and at night  Continues to await placement. Vital stable. 1/11  No overnight issues, patient continues to be dyspneic on minimal exertion which is likely his baseline now  Using BiPAP as needed during the day and at night  Denies any chest pain otherwise tolerating p.o. well    1/12  Still feels the same  Vitals stable patient has been afebrile  Continues to use BiPAP as needed    1/13/23  Stable . Awaits placement  Uses BiPAP as needed  Past Medical History:     Past Medical History:   Diagnosis Date    COPD (chronic obstructive pulmonary disease) ([facility] [address]     Hypertension         Past Surgical History:     History reviewed. No pertinent surgical history. Medications Prior to Admission:     Prior to Admission medications    Medication Sig Start Date End Date Taking? Authorizing Provider   dextromethorphan (DELSYM) 30 MG/5ML extended release liquid Take 10 mLs by mouth every 12 hours for 10 days 1/11/23 1/21/23 Yes Lorelei Jose MD   guaiFENesin (MUCINEX) 600 MG extended release tablet Take 2 tablets by mouth 2 times daily 1/11/23  Yes Lorelei Jose MD   predniSONE (DELTASONE) 10 MG tablet tAKE 4 TABLETS FOR 2 DAYS , Take 3 tablets together daily for 3 days, then 2 tablets daily for 3 days, then 1 tablet daily for 3 days.  1/11/23 1/21/23 Yes Lorelei Jose MD   SYMBICORT 160-4.5 MCG/ACT AERO INHALE 2 PUFFS BY MOUTH ONCE DAILY 1/5/23   Carlos Crawford MD   traZODone (DESYREL) 50 MG tablet Take 1 tablet by mouth nightly 1/5/23   Carlos Crawford MD   Aura Gey 18 MCG inhalation capsule Inhale 1 puff by mouth once daily 1/5/23   Carlos Crawford MD   folic acid (FOLVITE) 1 MG tablet Take 1 tablet by mouth daily 6/22/22   Victoriano Leon MD   thiamine 100 MG tablet Take 1 tablet by mouth daily 6/22/22   Chi Jauregui Ubaldo Holden MD   vitamin D (ERGOCALCIFEROL) 1.25 MG (90427 UT) CAPS capsule Take 1 capsule by mouth once a week 6/22/22   Kyra Serrano MD   albuterol (PROVENTIL) (2.5 MG/3ML) 0.083% nebulizer solution Take 3 mLs by nebulization 4 times daily 6/7/22   Danny Ortiz DO   amLODIPine (NORVASC) 10 MG tablet Take 1 tablet by mouth daily 3/25/22   Kyra Serrano MD        Allergies:     Patient has no known allergies. Social History:     Tobacco:    reports that he has been smoking cigarettes. He has a 43.00 pack-year smoking history. He has never used smokeless tobacco.  Alcohol:      reports current alcohol use. Drug Use:  reports that he does not currently use drugs. Family History:     History reviewed. No pertinent family history. Review of Systems:     Positive and Negative as described in HPI. CONSTITUTIONAL:  negative for fevers, chills, sweats, fatigue, weight loss  HEENT:  negative for vision, hearing changes, runny nose, throat pain  RESPIRATORY: Shortness of breath, cough  CARDIOVASCULAR:  negative for chest pain, palpitations.   Patient has pain on right side of lower chest which worse with coughing  GASTROINTESTINAL:  negative for nausea, vomiting, diarrhea, constipation, change in bowel habits, abdominal pain   GENITOURINARY:  negative for difficulty of urination, burning with urination, frequency   INTEGUMENT:  negative for rash, skin lesions, easy bruising   HEMATOLOGIC/LYMPHATIC:  negative for swelling/edema   ALLERGIC/IMMUNOLOGIC:  negative for urticaria , itching  ENDOCRINE:  negative increase in drinking, increase in urination, hot or cold intolerance  MUSCULOSKELETAL:  negative joint pains, muscle aches, swelling of joints, clubbing present   NEUROLOGICAL:  negative for headaches, dizziness, lightheadedness, numbness, pain, tingling extremities  BEHAVIOR/PSYCH:  negative for depression, anxiety    Physical Exam:   /68   Pulse 98   Temp 98.6 °F (37 °C) (Oral)   Resp 20   Ht 5' 7.72\" (1.72 m)   Wt 137 lb 12.6 oz (62.5 kg)   SpO2 96%   BMI 21.13 kg/m²   Temp (24hrs), Av.3 °F (36.8 °C), Min:97.9 °F (36.6 °C), Max:98.6 °F (37 °C)    Recent Labs     23  1104   POCGLU 126*         Intake/Output Summary (Last 24 hours) at 2023 1005  Last data filed at 2023 2731  Gross per 24 hour   Intake 250 ml   Output --   Net 250 ml         General Appearance:  alert, well appearing, and in no acute distress  Mental status: oriented to person, place, and time with normal affect  Head:  normocephalic, atraumatic. Eye: no icterus, redness, pupils equal and reactive, extraocular eye movements intact, conjunctiva clear  Ear: normal external ear, no discharge, hearing intact  Nose:  no drainage noted  Mouth: mucous membranes moist  Neck: supple, no carotid bruits, thyroid not palpable  Lungs: Air entry, bilateral decreased, no rales, rhonchi  Cardiovascular: normal rate, regular rhythm, no murmur, gallop, rub. Abdomen: Soft, nontender, nondistended, normal bowel sounds, no hepatomegaly or splenomegaly  Neurologic: There are no new focal motor or sensory deficits, normal muscle tone and bulk, no abnormal sensation, normal speech, cranial nerves II through XII grossly intact  Skin: No gross lesions, rashes, bruising or bleeding on exposed skin area  Extremities:  peripheral pulses palpable, no pedal edema or calf pain with palpation  Psych: normal affect     Investigations:      Laboratory Testing:  No results found for this or any previous visit (from the past 24 hour(s)).           Imaging/Diagnostics:      Assessment :      Primary Problem  COPD exacerbation [facility])    Active Hospital Problems    Diagnosis Date Noted    Hypercapnic respiratory failure, chronic ([facility] [address] [J96.12] 2023     Priority: Medium    COPD exacerbation ([facility] [address] [J44.1] 2022     Priority: Medium    Renal mass, left [N28.89] 2022     Priority: Medium    Chronic obstructive pulmonary disease ([address] [J44.9] 01/28/2022    Chronic respiratory failure with hypoxia [facility]) [J96.11] 01/28/2022    Benign essential HTN [I10] 01/28/2022       Plan:     Patient status Admit as inpatient in the  Medical ICU    Advanced COPD,, admitted with worsening shortness of breath,  Acute hypercapnic respiratory failure, on BiPAP, steroids, DuoNeb nebulization  Hypertension, restarted home dose of Norvasc, controlled  On DVT prophylaxis Lovenox  Patient is clubbing, CT chest done in 6/22 , which was negative   Overall prognosis guarded, given advanced COPD, chronic hypoxic respiratory failure  Patient had a left renal mass, which was seen in CT scan in June of this year, need urology input as outpatient  1/5   Patient feeling better  Complaining of cough and dryness of mouth with continuous BiPAP  Started on cough medication  Much improved  Will discuss with pulmonary medicine team, likely transfer out of ICU    1/6  Transferred out of ICU  On Mucinex  Not able to expectorate  Will give Acapella  Continues to be on IV steroids  Patient will likely long-term steroids  Will order PT OT eval.    1/7  Patient had episode of NSVT likely precipitated by underlying hypoxia  Electrolytes were checked which were normal  Troponin was normal  Will check echocardiogram  Chest x-ray did yesterday was nonacute  Patient currently on BiPAP  Dose of Mucinex was increased yesterday  Advised to do Acapella to get secretions out  On antibiotics will complete total of 7 days. On IV steroids will likely need long-term steroids on discharge.   CODE STATUS discussed again today patient wants to remain full code  Patient understands that he is critically sick  Will also need urology follow-up for left renal mass    1/8  No further episode of arrhythmia  Echocardiogram is pending  Patient continues to use BiPAP as needed during the day and at night  Using Acapella  On antibiotics  Dose of steroid decreased    1/9  No more episode of NSVT noted  Echocardiogram is pending  On antibiotics IV steroids  Dose of steroid decreased to 40 mg every 12  Patient using BiPAP as needed during the day at night  Overall prognosis is poor  Discussed the CODE STATUS patient wants to be full code. 1/10\  Echocardiogram showed preserved ejection fraction  Patient on 40 every 12  Completed course of cefepime  Continues to await placement    1/11  Dose of prednisone decreased yesterday  Last dose of cefepime today  Continues to await placement  Continue current dose of amlodipine for hypertension  Patient otherwise medically cleared to be discharged      1/12  Started on p.o. steroids  Finished antibiotic course  Continue amlodipine for hypertension  Patient medically cleared for discharge awaiting placement. 1/13/23  No new symptoms  Labs vital stable  Awaiting placement  Discharge order. Placed      1/35/97    Await precert . No new symptoms  Vitals stable . Cardiopulmonary stable . Continue current rx ,                 Consultations:   IP CONSULT TO PULMONOLOGY  IP CONSULT TO PRIMARY CARE PROVIDER  IP CONSULT TO SOCIAL WORK    Patient is admitted as inpatient status because of co-morbidities listed above, severity of signs and symptoms as outlined, requirement for current medical therapies and most importantly because of direct risk to patient if care not provided in a hospital setting. Tracey Ross MD  1/14/2023  10:05 AM    Copy sent to Dr. Nyoka Hodgkins, MD    Please note that this chart was generated using voice recognition Dragon dictation software. Although every effort was made to ensure the accuracy of this automated transcription, some errors in transcription may have occurred.

## 2023-01-15 PROCEDURE — 94660 CPAP INITIATION&MGMT: CPT

## 2023-01-15 PROCEDURE — 99231 SBSQ HOSP IP/OBS SF/LOW 25: CPT | Performed by: INTERNAL MEDICINE

## 2023-01-15 PROCEDURE — 2700000000 HC OXYGEN THERAPY PER DAY

## 2023-01-15 PROCEDURE — 6370000000 HC RX 637 (ALT 250 FOR IP): Performed by: INTERNAL MEDICINE

## 2023-01-15 PROCEDURE — 1200000000 HC SEMI PRIVATE

## 2023-01-15 PROCEDURE — 94761 N-INVAS EAR/PLS OXIMETRY MLT: CPT

## 2023-01-15 PROCEDURE — 94640 AIRWAY INHALATION TREATMENT: CPT

## 2023-01-15 PROCEDURE — 6360000002 HC RX W HCPCS: Performed by: INTERNAL MEDICINE

## 2023-01-15 PROCEDURE — 6370000000 HC RX 637 (ALT 250 FOR IP): Performed by: NURSE PRACTITIONER

## 2023-01-15 RX ADMIN — GUAIFENESIN 1200 MG: 600 TABLET, EXTENDED RELEASE ORAL at 08:30

## 2023-01-15 RX ADMIN — ENOXAPARIN SODIUM 40 MG: 100 INJECTION SUBCUTANEOUS at 08:29

## 2023-01-15 RX ADMIN — IPRATROPIUM BROMIDE AND ALBUTEROL SULFATE 1 AMPULE: 2.5; .5 SOLUTION RESPIRATORY (INHALATION) at 23:10

## 2023-01-15 RX ADMIN — GUAIFENESIN 1200 MG: 600 TABLET, EXTENDED RELEASE ORAL at 20:13

## 2023-01-15 RX ADMIN — IPRATROPIUM BROMIDE AND ALBUTEROL SULFATE 1 AMPULE: 2.5; .5 SOLUTION RESPIRATORY (INHALATION) at 03:16

## 2023-01-15 RX ADMIN — AMLODIPINE BESYLATE 10 MG: 10 TABLET ORAL at 08:30

## 2023-01-15 RX ADMIN — BUDESONIDE AND FORMOTEROL FUMARATE DIHYDRATE 2 PUFF: 160; 4.5 AEROSOL RESPIRATORY (INHALATION) at 07:01

## 2023-01-15 RX ADMIN — IPRATROPIUM BROMIDE AND ALBUTEROL SULFATE 1 AMPULE: 2.5; .5 SOLUTION RESPIRATORY (INHALATION) at 20:46

## 2023-01-15 RX ADMIN — PREDNISONE 40 MG: 20 TABLET ORAL at 08:30

## 2023-01-15 RX ADMIN — IPRATROPIUM BROMIDE AND ALBUTEROL SULFATE 1 AMPULE: 2.5; .5 SOLUTION RESPIRATORY (INHALATION) at 07:01

## 2023-01-15 RX ADMIN — FOLIC ACID 1 MG: 1 TABLET ORAL at 08:30

## 2023-01-15 RX ADMIN — Medication 60 MG: at 20:14

## 2023-01-15 RX ADMIN — THIAMINE HCL TAB 100 MG 100 MG: 100 TAB at 08:30

## 2023-01-15 RX ADMIN — IPRATROPIUM BROMIDE AND ALBUTEROL SULFATE 1 AMPULE: 2.5; .5 SOLUTION RESPIRATORY (INHALATION) at 10:59

## 2023-01-15 RX ADMIN — Medication 60 MG: at 08:30

## 2023-01-15 NOTE — PLAN OF CARE
Problem: Discharge Planning  Goal: Discharge to home or other facility with appropriate resources  1/15/2023 1447 by Radha Soares RN  Outcome: Progressing  Flowsheets (Taken 1/15/2023 0830)  Discharge to home or other facility with appropriate resources: Identify barriers to discharge with patient and caregiver   Pt to discharge to facility once precert is approved. Problem: Safety - Adult  Goal: Free from fall injury  1/15/2023 1447 by Radha Soares RN  Outcome: Progressing  Flowsheets (Taken 1/15/2023 0908)  Free From Fall Injury: Instruct family/caregiver on patient safety   No injury noted this shift. Problem: Skin/Tissue Integrity  Goal: Absence of new skin breakdown  Description: 1. Monitor for areas of redness and/or skin breakdown  2. Assess vascular access sites hourly  3. Every 4-6 hours minimum:  Change oxygen saturation probe site  4. Every 4-6 hours:  If on nasal continuous positive airway pressure, respiratory therapy assess nares and determine need for appliance change or resting period. 1/15/2023 1447 by Radha Soares RN  Outcome: Progressing   No new altered skin noted this shift. Problem: ABCDS Injury Assessment  Goal: Absence of physical injury  1/15/2023 1447 by Radha Soares RN  Outcome: Progressing  Flowsheets (Taken 1/15/2023 0908)  Absence of Physical Injury: Implement safety measures based on patient assessment   No injury noted this shift.

## 2023-01-15 NOTE — PROGRESS NOTES
We will waitIs this Katelin Adrian did not go this patient did not go in her    [address]              Date:   1/15/2023  Patient name:  Pinky El  Date of admission:  1/3/2023  5:10 PM  MRN:   [id number]  Account:  [de-identified]  YOB: 1965  PCP:    Johan Arias MD  Room:   [address] [place] [address]  Code Status:    Full Code    Chief Complaint:     Chief Complaint   Patient presents with    Respiratory Distress       History Obtained From:     patient, electronic medical record    History of Present Illness: The patient is a 61 y.o.   Non- / non  male who presents with Respiratory Distress   and he is admitted to the hospital for the management of shortness of breath  Patient past medical history multiple medical problem which include hypertension, advanced COPD, chronic alcoholism, patient, was admitted with worsening shortness of breath, symptoms are going on for last few days before presentation  Patient apparently was not taking his medication  Was admitted at [facility] with [facility]  Patient denying complaints of chest pain  In emergency room COVID was positive, was cleared by pulmonary medicine team for droplet plus precautions  Patient in emergency room, was found to have pH of 7.2, PCO2 was too high to be recorded, remained on BiPAP overnight, repeat PCO2 is 91    1/5   Patient, clinically doing better  Did use BiPAP last night  On intermittent BiPAP  No new complaints      1/7  Patient got transferred out of ICU this morning  Complaining of worsening cough  Not able to get phlegm out  Will give  acapella  DC fluids      1/8  Had episode of worsening shortness of breath yesterday  Was placed on BiPAP  Also had 5 beat run of V. tach  Electrolytes were normal  Patient denied any chest pain or palpitations  Currently on BiPAP  States that he is feeling slightly better than yesterday  His vital signs been stable  Patient has been afebrile. 1/9  On BiPAP  States that he is feeling the same  Denies any chest pain  Has been afebrile  Echocardiogram pending. 1/10  Patient states is feeling okay  Continues to be on IV steroids  Finished antibiotic course  Using BiPAP as needed during the day and at night  Continues to await placement. Vital stable. 1/11  No overnight issues, patient continues to be dyspneic on minimal exertion which is likely his baseline now  Using BiPAP as needed during the day and at night  Denies any chest pain otherwise tolerating p.o. well    1/12  Still feels the same  Vitals stable patient has been afebrile  Continues to use BiPAP as needed    1/13/23  Stable . Awaits placement  Uses BiPAP as needed  Past Medical History:     Past Medical History:   Diagnosis Date    COPD (chronic obstructive pulmonary disease) ([facility] [address]     Hypertension         Past Surgical History:     History reviewed. No pertinent surgical history. Medications Prior to Admission:     Prior to Admission medications    Medication Sig Start Date End Date Taking? Authorizing Provider   dextromethorphan (DELSYM) 30 MG/5ML extended release liquid Take 10 mLs by mouth every 12 hours for 10 days 1/11/23 1/21/23 Yes Marilee Archer MD   guaiFENesin (MUCINEX) 600 MG extended release tablet Take 2 tablets by mouth 2 times daily 1/11/23  Yes Marilee Archer MD   predniSONE (DELTASONE) 10 MG tablet tAKE 4 TABLETS FOR 2 DAYS , Take 3 tablets together daily for 3 days, then 2 tablets daily for 3 days, then 1 tablet daily for 3 days.  1/11/23 1/21/23 Yes Marilee Archer MD   SYMBICORT 160-4.5 MCG/ACT AERO INHALE 2 PUFFS BY MOUTH ONCE DAILY 1/5/23   Federico Amin MD   traZODone (DESYREL) 50 MG tablet Take 1 tablet by mouth nightly 1/5/23   MD Martin Dunham Ling 18 MCG inhalation capsule Inhale 1 puff by mouth once daily 1/5/23   Federico Amin MD   folic acid (FOLVITE) 1 MG tablet Take 1 tablet by mouth daily 6/22/22   Luann Grewal MD thiamine 100 MG tablet Take 1 tablet by mouth daily 6/22/22   Antonina Coronel MD   vitamin D (ERGOCALCIFEROL) 1.25 MG (62845 UT) CAPS capsule Take 1 capsule by mouth once a week 6/22/22   Antonina Coronel MD   albuterol (PROVENTIL) (2.5 MG/3ML) 0.083% nebulizer solution Take 3 mLs by nebulization 4 times daily 6/7/22   Janey Malone DO   amLODIPine (NORVASC) 10 MG tablet Take 1 tablet by mouth daily 3/25/22   Antonina Coronel MD        Allergies:     Patient has no known allergies. Social History:     Tobacco:    reports that he has been smoking cigarettes. He has a 43.00 pack-year smoking history. He has never used smokeless tobacco.  Alcohol:      reports current alcohol use. Drug Use:  reports that he does not currently use drugs. Family History:     History reviewed. No pertinent family history. Review of Systems:     Positive and Negative as described in HPI. CONSTITUTIONAL:  negative for fevers, chills, sweats, fatigue, weight loss  HEENT:  negative for vision, hearing changes, runny nose, throat pain  RESPIRATORY: Shortness of breath, cough  CARDIOVASCULAR:  negative for chest pain, palpitations.   Patient has pain on right side of lower chest which worse with coughing  GASTROINTESTINAL:  negative for nausea, vomiting, diarrhea, constipation, change in bowel habits, abdominal pain   GENITOURINARY:  negative for difficulty of urination, burning with urination, frequency   INTEGUMENT:  negative for rash, skin lesions, easy bruising   HEMATOLOGIC/LYMPHATIC:  negative for swelling/edema   ALLERGIC/IMMUNOLOGIC:  negative for urticaria , itching  ENDOCRINE:  negative increase in drinking, increase in urination, hot or cold intolerance  MUSCULOSKELETAL:  negative joint pains, muscle aches, swelling of joints, clubbing present   NEUROLOGICAL:  negative for headaches, dizziness, lightheadedness, numbness, pain, tingling extremities  BEHAVIOR/PSYCH:  negative for depression, anxiety    Physical Exam: /78   Pulse 97   Temp 98.1 °F (36.7 °C) (Oral)   Resp 18   Ht 5' 7.72\" (1.72 m)   Wt 137 lb 12.6 oz (62.5 kg)   SpO2 96%   BMI 21.13 kg/m²   Temp (24hrs), Av.4 °F (36.9 °C), Min:98.1 °F (36.7 °C), Max:98.6 °F (37 °C)    No results for input(s): POCGLU in the last 72 hours. Intake/Output Summary (Last 24 hours) at 1/15/2023 1404  Last data filed at 1/15/2023 0830  Gross per 24 hour   Intake 480 ml   Output --   Net 480 ml           General Appearance:  alert, well appearing, and in no acute distress  Mental status: oriented to person, place, and time with normal affect  Head:  normocephalic, atraumatic. Eye: no icterus, redness, pupils equal and reactive, extraocular eye movements intact, conjunctiva clear  Ear: normal external ear, no discharge, hearing intact  Nose:  no drainage noted  Mouth: mucous membranes moist  Neck: supple, no carotid bruits, thyroid not palpable  Lungs: Air entry, bilateral decreased, no rales, rhonchi  Cardiovascular: normal rate, regular rhythm, no murmur, gallop, rub. Abdomen: Soft, nontender, nondistended, normal bowel sounds, no hepatomegaly or splenomegaly  Neurologic: There are no new focal motor or sensory deficits, normal muscle tone and bulk, no abnormal sensation, normal speech, cranial nerves II through XII grossly intact  Skin: No gross lesions, rashes, bruising or bleeding on exposed skin area  Extremities:  peripheral pulses palpable, no pedal edema or calf pain with palpation  Psych: normal affect     Investigations:      Laboratory Testing:  No results found for this or any previous visit (from the past 24 hour(s)).           Imaging/Diagnostics:      Assessment :      Primary Problem  COPD exacerbation [facility])    Active Hospital Problems    Diagnosis Date Noted    Hypercapnic respiratory failure, chronic ([facility] [address] [J96.12] 2023     Priority: Medium    COPD exacerbation ([facility] [address] [J44.1] 2022     Priority: Medium    Renal mass, left [N28.89] 06/22/2022     Priority: Medium    Chronic obstructive pulmonary disease ([facility] [address] [J44.9] 01/28/2022    Chronic respiratory failure with hypoxia [facility]) [J96.11] 01/28/2022    Benign essential HTN [I10] 01/28/2022       Plan:     Patient status Admit as inpatient in the  Medical ICU    Advanced COPD,, admitted with worsening shortness of breath,  Acute hypercapnic respiratory failure, on BiPAP, steroids, DuoNeb nebulization  Hypertension, restarted home dose of Norvasc, controlled  On DVT prophylaxis Lovenox  Patient is clubbing, CT chest done in 6/22 , which was negative   Overall prognosis guarded, given advanced COPD, chronic hypoxic respiratory failure  Patient had a left renal mass, which was seen in CT scan in June of this year, need urology input as outpatient  1/5   Patient feeling better  Complaining of cough and dryness of mouth with continuous BiPAP  Started on cough medication  Much improved  Will discuss with pulmonary medicine team, likely transfer out of ICU    1/6  Transferred out of ICU  On Mucinex  Not able to expectorate  Will give Acapella  Continues to be on IV steroids  Patient will likely long-term steroids  Will order PT OT eval.    1/7  Patient had episode of NSVT likely precipitated by underlying hypoxia  Electrolytes were checked which were normal  Troponin was normal  Will check echocardiogram  Chest x-ray did yesterday was nonacute  Patient currently on BiPAP  Dose of Mucinex was increased yesterday  Advised to do Acapella to get secretions out  On antibiotics will complete total of 7 days. On IV steroids will likely need long-term steroids on discharge.   CODE STATUS discussed again today patient wants to remain full code  Patient understands that he is critically sick  Will also need urology follow-up for left renal mass    1/8  No further episode of arrhythmia  Echocardiogram is pending  Patient continues to use BiPAP as needed during the day and at night  Using Acapella  On antibiotics  Dose of steroid decreased    1/9  No more episode of NSVT noted  Echocardiogram is pending  On antibiotics IV steroids  Dose of steroid decreased to 40 mg every 12  Patient using BiPAP as needed during the day at night  Overall prognosis is poor  Discussed the CODE STATUS patient wants to be full code.    1/10\  Echocardiogram showed preserved ejection fraction  Patient on 40 every 12  Completed course of cefepime  Continues to await placement    1/11  Dose of prednisone decreased yesterday  Last dose of cefepime today  Continues to await placement  Continue current dose of amlodipine for hypertension  Patient otherwise medically cleared to be discharged      1/12  Started on p.o. steroids  Finished antibiotic course  Continue amlodipine for hypertension  Patient medically cleared for discharge awaiting placement.    1/13/23  No new symptoms  Labs vital stable  Awaiting placement  Discharge order.  Placed      1/14/23    No new symptoms  Vitals stable . Cardiopulmonary stable .    Continue current rx ,       ?Leaving at 5 pm for [facility]        1/15/23    Patient is stable  Awaiting restart  Continue current therapy   Vitals stable treatment reviewed            Consultations:   IP CONSULT TO PULMONOLOGY  IP CONSULT TO PRIMARY CARE PROVIDER  IP CONSULT TO SOCIAL WORK    Patient is admitted as inpatient status because of co-morbidities listed above, severity of signs and symptoms as outlined, requirement for current medical therapies and most importantly because of direct risk to patient if care not provided in a hospital setting.    Lolyd Diallo MD  1/15/2023  2:04 PM    Copy sent to Dr. Erika Quiroz MD    Please note that this chart was generated using voice recognition Dragon dictation software.  Although every effort was made to ensure the accuracy of this automated transcription, some errors in transcription may have occurred.

## 2023-01-15 NOTE — CARE COORDINATION
ONGOING DISCHARGE  NOTE:      Writer reviewed notes, Patient is agreeable to [place] [address]  Phone [phone]  Fax [phone]  Evening fax [phone]       Discharge is pending pre cert. Pre cert has been started. Pre cert was started on 5/72     Patient requires a RAPID COVID at discharge as well. Will continue to follow for additional discharge needs.      Electronically signed by Mariana Cheadle, RN on 1/15/2023 at 8:27 AM

## 2023-01-16 PROCEDURE — 94660 CPAP INITIATION&MGMT: CPT

## 2023-01-16 PROCEDURE — 99231 SBSQ HOSP IP/OBS SF/LOW 25: CPT | Performed by: INTERNAL MEDICINE

## 2023-01-16 PROCEDURE — 6370000000 HC RX 637 (ALT 250 FOR IP): Performed by: NURSE PRACTITIONER

## 2023-01-16 PROCEDURE — 94761 N-INVAS EAR/PLS OXIMETRY MLT: CPT

## 2023-01-16 PROCEDURE — 6370000000 HC RX 637 (ALT 250 FOR IP): Performed by: INTERNAL MEDICINE

## 2023-01-16 PROCEDURE — 2700000000 HC OXYGEN THERAPY PER DAY

## 2023-01-16 PROCEDURE — 1200000000 HC SEMI PRIVATE

## 2023-01-16 PROCEDURE — 94640 AIRWAY INHALATION TREATMENT: CPT

## 2023-01-16 RX ORDER — PREDNISONE 20 MG/1
20 TABLET ORAL DAILY
Status: DISCONTINUED | OUTPATIENT
Start: 2023-01-20 | End: 2023-01-19 | Stop reason: HOSPADM

## 2023-01-16 RX ORDER — PREDNISONE 10 MG/1
10 TABLET ORAL DAILY
Status: DISCONTINUED | OUTPATIENT
Start: 2023-01-23 | End: 2023-01-19 | Stop reason: HOSPADM

## 2023-01-16 RX ADMIN — IPRATROPIUM BROMIDE AND ALBUTEROL SULFATE 1 AMPULE: 2.5; .5 SOLUTION RESPIRATORY (INHALATION) at 14:39

## 2023-01-16 RX ADMIN — Medication 60 MG: at 07:56

## 2023-01-16 RX ADMIN — IPRATROPIUM BROMIDE AND ALBUTEROL SULFATE 1 AMPULE: 2.5; .5 SOLUTION RESPIRATORY (INHALATION) at 19:19

## 2023-01-16 RX ADMIN — GUAIFENESIN 1200 MG: 600 TABLET, EXTENDED RELEASE ORAL at 07:55

## 2023-01-16 RX ADMIN — FLUTICASONE PROPIONATE 1 SPRAY: 50 SPRAY, METERED NASAL at 07:56

## 2023-01-16 RX ADMIN — Medication 60 MG: at 21:23

## 2023-01-16 RX ADMIN — THIAMINE HCL TAB 100 MG 100 MG: 100 TAB at 07:55

## 2023-01-16 RX ADMIN — AMLODIPINE BESYLATE 10 MG: 10 TABLET ORAL at 07:56

## 2023-01-16 RX ADMIN — PREDNISONE 40 MG: 20 TABLET ORAL at 07:56

## 2023-01-16 RX ADMIN — IPRATROPIUM BROMIDE AND ALBUTEROL SULFATE 1 AMPULE: 2.5; .5 SOLUTION RESPIRATORY (INHALATION) at 11:10

## 2023-01-16 RX ADMIN — IPRATROPIUM BROMIDE AND ALBUTEROL SULFATE 1 AMPULE: 2.5; .5 SOLUTION RESPIRATORY (INHALATION) at 23:08

## 2023-01-16 RX ADMIN — FOLIC ACID 1 MG: 1 TABLET ORAL at 07:56

## 2023-01-16 RX ADMIN — GUAIFENESIN 1200 MG: 600 TABLET, EXTENDED RELEASE ORAL at 21:23

## 2023-01-16 NOTE — PROGRESS NOTES
Primitivo Hurd MD/Mukul Gabriel MD/ Shawnee Delacruz MD/Dr Yoly Angela APRN AGAULYSSESP-BC, NP-C      Caitlyn MALDONADO NP-C     Lindsey MALDONADO NP-C                                           Pulmonary Progress Note    Patient - Leo Loza   Age - 61 y.o.   - 1965  MRN - [id number]  Acct # - [de-identified]  Date of Admission - 1/3/2023  5:10 PM    Consulting Service/Physician:       Primary Care Physician: Steven Salgado MD    SUBJECTIVE:     Chief Complaint:   Chief Complaint   Patient presents with    Respiratory Distress     Subjective:    Kristina Lindquist tells me he is feeling okay today. He did use the BiPAP overnight per his report. He is currently on 4-1/2 L nasal cannula pulse ox 96%. Prednisone will be weaned down to 30 mg starting tomorrow with recommendation for taper by 10 mg every 3 days. Plan for transfer to [facility] noted. He has been afebrile. VITALS  /79   Pulse 92   Temp 97.7 °F (36.5 °C)   Resp 17   Ht 5' 7.72\" (1.72 m)   Wt 137 lb 12.6 oz (62.5 kg)   SpO2 96%   BMI 21.13 kg/m²   Wt Readings from Last 3 Encounters:   23 137 lb 12.6 oz (62.5 kg)   22 154 lb 1.6 oz (69.9 kg)   22 140 lb 6.4 oz (63.7 kg)     I/O (24 Hours)    Intake/Output Summary (Last 24 hours) at 2023 0901  Last data filed at 2023 0500  Gross per 24 hour   Intake 1240 ml   Output 600 ml   Net 640 ml     Ventilator:   Settings  FiO2 : 35 %  Insp Rise Time (%): 1 %  Exam:   Physical Exam   Constitutional: Lying in bed on 4 and half liters nasal cannula in no acute distress  HENT: Unremarkable  Head: Normocephalic and atraumatic. Eyes: EOM are normal. Pupils are equal, round, and reactive to light. Neck: Neck supple. Cardiovascular:  Regular rate and rhythm. Normal heart tones. No JVD.     Pulmonary/Chest: Respirations even and nonlabored, lungs diminished posteriorly, on 4 and half liters nasal cannula pulse ox 96%  Abdominal: Soft. Bowel sounds are normal.   Musculoskeletal: Normal range of motion. Neurological: Patient is alert and oriented to person, place, and time. Skin: Skin is warm and dry. No rash noted.    Extremities: No edema or discoloration  Infusions:      sodium chloride       Meds:     Current Facility-Administered Medications:     predniSONE (DELTASONE) tablet 40 mg, 40 mg, Oral, Daily, Nikos Panchal, APRN - CNP, 40 mg at 01/16/23 0756    fluticasone (FLONASE) 50 MCG/ACT nasal spray 1 spray, 1 spray, Each Nostril, Daily, Abigail Bazzi MD, 1 spray at 01/16/23 0756    perflutren lipid microspheres (DEFINITY) injection 1.5 mL, 1.5 mL, IntraVENous, ONCE PRN, Juliet Lr MD    phenol 1.4 % mouth spray 1 spray, 1 spray, Mouth/Throat, Q2H PRN, Miguel A Higgins MD    guaiFENesin [facility]) extended release tablet 1,200 mg, 1,200 mg, Oral, BID, Miguel A Higgins MD, 1,200 mg at 01/16/23 0755    dextromethorphan (DELSYM) 30 MG/5ML extended release liquid 60 mg, 60 mg, Oral, 2 times per day, Miguel A Higgins MD, 60 mg at 01/16/23 0756    ipratropium-albuterol (DUONEB) nebulizer solution 1 ampule, 1 ampule, Inhalation, Q4H, Miguel A Higgins MD, 1 ampule at 01/15/23 2310    ipratropium-albuterol (DUONEB) nebulizer solution 1 ampule, 1 ampule, Inhalation, Q4H PRN, Miguel A Higgins MD    enoxaparin (LOVENOX) injection 40 mg, 40 mg, SubCUTAneous, Daily, Vandana Rivas MD, 40 mg at 01/15/23 0829    amLODIPine (NORVASC) tablet 10 mg, 10 mg, Oral, Daily, Alexy Lightning, APRN - NP, 10 mg at 62/46/24 1881    folic acid (FOLVITE) tablet 1 mg, 1 mg, Oral, Daily, Alexy Lightning, APRN - NP, 1 mg at 01/16/23 0756    budesonide-formoterol (SYMBICORT) 160-4.5 MCG/ACT inhaler 2 puff, 2 puff, Inhalation, Daily, Alexy Lightning, APRN - NP, 2 puff at 01/15/23 0701    thiamine mononitrate tablet 100 mg, 100 mg, Oral, Daily, Alexy Lightning, APRN - NP, 100 mg at 01/16/23 0755    sodium chloride flush 0.9 % injection 5-40 mL, 5-40 mL, IntraVENous, 2 times per day, Sonia Leonardo APRN - NP    sodium chloride flush 0.9 % injection 5-40 mL, 5-40 mL, IntraVENous, PRN, Jennifer Neal APRN - NP    0.9 % sodium chloride infusion, , IntraVENous, PRN, Sonia Leonardo, APRN - NP    ondansetron (ZOFRAN-ODT) disintegrating tablet 4 mg, 4 mg, Oral, Q8H PRN **OR** ondansetron (ZOFRAN) injection 4 mg, 4 mg, IntraVENous, Q6H PRN, Sonia Leonardo, APRN - NP    polyethylene glycol (GLYCOLAX) packet 17 g, 17 g, Oral, Daily PRN, Sonia Leonardo APRN - NP    acetaminophen (TYLENOL) tablet 650 mg, 650 mg, Oral, Q6H PRN **OR** acetaminophen (TYLENOL) suppository 650 mg, 650 mg, Rectal, Q6H PRN, Sonia Leonardo APRN - NP    Lab Results:     Lab Results   Component Value Date    WBC 8.5 01/11/2023    HGB 10.7 (L) 01/11/2023    HCT 32.5 (L) 01/11/2023    MCV 93.8 01/11/2023     01/11/2023     Lab Results   Component Value Date    CALCIUM 8.6 01/11/2023     (L) 01/11/2023    K 4.9 01/11/2023    CO2 45 (HH) 01/11/2023    CL 88 (L) 01/11/2023    BUN 19 01/11/2023    CREATININE 0.40 (L) 01/11/2023         Radiology:       ASSESSMENT:       Acute on chronic hypoxemic respiratory failure baseline 5L, currently at his baseline  Acute on chronic hypercapnic respiratory failure-not very compliant with BiPAP, bicarb 45 on CHEM profile  COPD exacerbation-slowly improving  History of severe COPD; maintained on Spiriva, Symbicort Proventil- follows with Dr. Bambi Odonnell  Recent COVID-19 infection in December at [facility], tested positive on 12/8/2022, greater than 30 days ago  Tobacco abuse  Hypertension  Full code  PLAN:   Continue on his baseline oxygen  Recommend BiPAP at night and with naps, referral has been made for NIV given chronic hypercapnic respiratory failure and severe COPD  Completed antibiotics  Prednisone taper, will decrease to 30 mg starting tomorrow.   Should decrease by 10 mg every 3 days until completed  Outpatient pulmonary follow-up with our office in 3 to 4 weeks, [phone]  Encourage smoking cessation  No objection to transfer to skilled nursing facility from pulmonary standpoint      Electronically signed by ERICA Fuentes CNP on 01/16/23     This progress note was completed using a voice transcription system. Every effort was made to ensure accuracy. However, inadvertent computerized transcription errors may be present.     Bindu Klein, NP-C, MSN  [facility] Pulmonary, Critical Care & Sleep

## 2023-01-16 NOTE — PROGRESS NOTES
Nutrition Assessment     Type and Reason for Visit: RD Nutrition Re-Screen/LOS    Nutrition Recommendations/Plan:   Will continue to provide 4 carbohydrate choices per tray     Malnutrition Assessment:  Malnutrition Status:  No malnutrition    Nutrition Assessment:  Pt continues to meet nutrition needs with po intake mostly greater than 75%    Nutrition Related Findings:     Wound Type: None    Current Nutrition Therapies:    ADULT DIET;  Regular; 4 carb choices (60 gm/meal)    Anthropometric Measures:  Height: 5' 7.72\" (172 cm)  Current Body Wt: 137 lb (62.1 kg)   BMI: 21    Nutrition Diagnosis:   No nutrition diagnosis at this time     Nutrition Interventions:   Food and/or Nutrient Delivery: Continue Current Diet                      Nutrition Monitoring and Evaluation:      Food/Nutrient Intake Outcomes: Food and Nutrient Intake       Discharge Planning:    Continue current diet     Herson Navas, [address] [place],   Contact: [phone]

## 2023-01-16 NOTE — CARE COORDINATION
Authorization for [facility] was submitted on 1/13.     Electronically signed by MATTEO Ferrer on 1/16/2023 at 11:53 AM

## 2023-01-16 NOTE — PROGRESS NOTES
We will waitIs this Enid Kos did not go this patient did not go in her    [address]              Date:   1/16/2023  Patient name:  Scott Rodriguez  Date of admission:  1/3/2023  5:10 PM  MRN:   [id number]  Account:  [de-identified]  YOB: 1965  PCP:    Ursula Knutson MD  Room:   [address] [place] [address]  Code Status:    Full Code    Chief Complaint:     Chief Complaint   Patient presents with    Respiratory Distress       History Obtained From:     patient, electronic medical record    History of Present Illness: The patient is a 61 y.o.   Non- / non  male who presents with Respiratory Distress   and he is admitted to the hospital for the management of shortness of breath  Patient past medical history multiple medical problem which include hypertension, advanced COPD, chronic alcoholism, patient, was admitted with worsening shortness of breath, symptoms are going on for last few days before presentation  Patient apparently was not taking his medication  Was admitted at [facility] with [facility]  Patient denying complaints of chest pain  In emergency room COVID was positive, was cleared by pulmonary medicine team for droplet plus precautions  Patient in emergency room, was found to have pH of 7.2, PCO2 was too high to be recorded, remained on BiPAP overnight, repeat PCO2 is 91    1/5   Patient, clinically doing better  Did use BiPAP last night  On intermittent BiPAP  No new complaints      1/7  Patient got transferred out of ICU this morning  Complaining of worsening cough  Not able to get phlegm out  Will give  acapella  DC fluids      1/8  Had episode of worsening shortness of breath yesterday  Was placed on BiPAP  Also had 5 beat run of V. tach  Electrolytes were normal  Patient denied any chest pain or palpitations  Currently on BiPAP  States that he is feeling slightly better than yesterday  His vital signs been stable  Patient has been afebrile.      1/9  On BiPAP  States that he is feeling the same  Denies any chest pain  Has been afebrile  Echocardiogram pending.      1/10  Patient states is feeling okay  Continues to be on IV steroids  Finished antibiotic course  Using BiPAP as needed during the day and at night  Continues to await placement.  Vital stable.    1/11  No overnight issues, patient continues to be dyspneic on minimal exertion which is likely his baseline now  Using BiPAP as needed during the day and at night  Denies any chest pain otherwise tolerating p.o. well    1/12  Still feels the same  Vitals stable patient has been afebrile  Continues to use BiPAP as needed    1/13/23  Stable . Awaits placement  Uses BiPAP as needed  Past Medical History:     Past Medical History:   Diagnosis Date    COPD (chronic obstructive pulmonary disease) (HCC)     Hypertension         Past Surgical History:     History reviewed. No pertinent surgical history.     Medications Prior to Admission:     Prior to Admission medications    Medication Sig Start Date End Date Taking? Authorizing Provider   dextromethorphan (DELSYM) 30 MG/5ML extended release liquid Take 10 mLs by mouth every 12 hours for 10 days 1/11/23 1/21/23 Yes Daniella Sierra MD   guaiFENesin (MUCINEX) 600 MG extended release tablet Take 2 tablets by mouth 2 times daily 1/11/23  Yes Daniella Sierra MD   predniSONE (DELTASONE) 10 MG tablet tAKE 4 TABLETS FOR 2 DAYS , Take 3 tablets together daily for 3 days, then 2 tablets daily for 3 days, then 1 tablet daily for 3 days. 1/11/23 1/21/23 Yes Daniella Sierra MD   SYMBICORT 160-4.5 MCG/ACT AERO INHALE 2 PUFFS BY MOUTH ONCE DAILY 1/5/23   Nicolas Josue MD   traZODone (DESYREL) 50 MG tablet Take 1 tablet by mouth nightly 1/5/23   Nicolas Josue MD   SPIRIVA HANDIHALER 18 MCG inhalation capsule Inhale 1 puff by mouth once daily 1/5/23   Nicolas Josue MD   folic acid (FOLVITE) 1 MG tablet Take 1 tablet by mouth daily 6/22/22   Erika Quiroz MD  thiamine 100 MG tablet Take 1 tablet by mouth daily 6/22/22   Lanette Soares MD   vitamin D (ERGOCALCIFEROL) 1.25 MG (86347 UT) CAPS capsule Take 1 capsule by mouth once a week 6/22/22   Lanette Soares MD   albuterol (PROVENTIL) (2.5 MG/3ML) 0.083% nebulizer solution Take 3 mLs by nebulization 4 times daily 6/7/22   Jada Anderson DO   amLODIPine (NORVASC) 10 MG tablet Take 1 tablet by mouth daily 3/25/22   Lanette Soares MD        Allergies:     Patient has no known allergies. Social History:     Tobacco:    reports that he has been smoking cigarettes. He has a 43.00 pack-year smoking history. He has never used smokeless tobacco.  Alcohol:      reports current alcohol use. Drug Use:  reports that he does not currently use drugs. Family History:     History reviewed. No pertinent family history. Review of Systems:     Positive and Negative as described in HPI. CONSTITUTIONAL:  negative for fevers, chills, sweats, fatigue, weight loss  HEENT:  negative for vision, hearing changes, runny nose, throat pain  RESPIRATORY: Shortness of breath, cough  CARDIOVASCULAR:  negative for chest pain, palpitations.   Patient has pain on right side of lower chest which worse with coughing  GASTROINTESTINAL:  negative for nausea, vomiting, diarrhea, constipation, change in bowel habits, abdominal pain   GENITOURINARY:  negative for difficulty of urination, burning with urination, frequency   INTEGUMENT:  negative for rash, skin lesions, easy bruising   HEMATOLOGIC/LYMPHATIC:  negative for swelling/edema   ALLERGIC/IMMUNOLOGIC:  negative for urticaria , itching  ENDOCRINE:  negative increase in drinking, increase in urination, hot or cold intolerance  MUSCULOSKELETAL:  negative joint pains, muscle aches, swelling of joints, clubbing present   NEUROLOGICAL:  negative for headaches, dizziness, lightheadedness, numbness, pain, tingling extremities  BEHAVIOR/PSYCH:  negative for depression, anxiety    Physical Exam: /79   Pulse (!) 106   Temp 97.7 °F (36.5 °C)   Resp 16   Ht 5' 7.72\" (1.72 m)   Wt 137 lb 12.6 oz (62.5 kg)   SpO2 96%   BMI 21.13 kg/m²   Temp (24hrs), Av.6 °F (36.4 °C), Min:97.5 °F (36.4 °C), Max:97.7 °F (36.5 °C)    No results for input(s): POCGLU in the last 72 hours. Intake/Output Summary (Last 24 hours) at 2023 1406  Last data filed at 2023 0500  Gross per 24 hour   Intake 760 ml   Output 600 ml   Net 160 ml           General Appearance:  alert, well appearing, and in no acute distress  Mental status: oriented to person, place, and time with normal affect  Head:  normocephalic, atraumatic. Eye: no icterus, redness, pupils equal and reactive, extraocular eye movements intact, conjunctiva clear  Ear: normal external ear, no discharge, hearing intact  Nose:  no drainage noted  Mouth: mucous membranes moist  Neck: supple, no carotid bruits, thyroid not palpable  Lungs: Air entry, bilateral decreased, no rales, rhonchi  Cardiovascular: normal rate, regular rhythm, no murmur, gallop, rub. Abdomen: Soft, nontender, nondistended, normal bowel sounds, no hepatomegaly or splenomegaly  Neurologic: There are no new focal motor or sensory deficits, normal muscle tone and bulk, no abnormal sensation, normal speech, cranial nerves II through XII grossly intact  Skin: No gross lesions, rashes, bruising or bleeding on exposed skin area  Extremities:  peripheral pulses palpable, no pedal edema or calf pain with palpation  Psych: normal affect     Investigations:      Laboratory Testing:  No results found for this or any previous visit (from the past 24 hour(s)).           Imaging/Diagnostics:      Assessment :      Primary Problem  COPD exacerbation [facility])    Active Hospital Problems    Diagnosis Date Noted    Hypercapnic respiratory failure, chronic ([facility] [address] [J96.12] 2023     Priority: Medium    COPD exacerbation ([facility] [address] [J44.1] 2022     Priority: Medium    Renal mass, left [N28.89] 06/22/2022     Priority: Medium    Chronic obstructive pulmonary disease ([facility] [address] [J44.9] 01/28/2022    Chronic respiratory failure with hypoxia [facility]) [J96.11] 01/28/2022    Benign essential HTN [I10] 01/28/2022       Plan:     Patient status Admit as inpatient in the  Medical ICU    Advanced COPD,, admitted with worsening shortness of breath,  Acute hypercapnic respiratory failure, on BiPAP, steroids, DuoNeb nebulization  Hypertension, restarted home dose of Norvasc, controlled  On DVT prophylaxis Lovenox  Patient is clubbing, CT chest done in 6/22 , which was negative   Overall prognosis guarded, given advanced COPD, chronic hypoxic respiratory failure  Patient had a left renal mass, which was seen in CT scan in June of this year, need urology input as outpatient  1/5   Patient feeling better  Complaining of cough and dryness of mouth with continuous BiPAP  Started on cough medication  Much improved  Will discuss with pulmonary medicine team, likely transfer out of ICU    1/6  Transferred out of ICU  On Mucinex  Not able to expectorate  Will give Acapella  Continues to be on IV steroids  Patient will likely long-term steroids  Will order PT OT eval.    1/7  Patient had episode of NSVT likely precipitated by underlying hypoxia  Electrolytes were checked which were normal  Troponin was normal  Will check echocardiogram  Chest x-ray did yesterday was nonacute  Patient currently on BiPAP  Dose of Mucinex was increased yesterday  Advised to do Acapella to get secretions out  On antibiotics will complete total of 7 days. On IV steroids will likely need long-term steroids on discharge.   CODE STATUS discussed again today patient wants to remain full code  Patient understands that he is critically sick  Will also need urology follow-up for left renal mass    1/8  No further episode of arrhythmia  Echocardiogram is pending  Patient continues to use BiPAP as needed during the day and at night  Using Acapella  On antibiotics  Dose of steroid decreased    1/9  No more episode of NSVT noted  Echocardiogram is pending  On antibiotics IV steroids  Dose of steroid decreased to 40 mg every 12  Patient using BiPAP as needed during the day at night  Overall prognosis is poor  Discussed the CODE STATUS patient wants to be full code. 1/10\  Echocardiogram showed preserved ejection fraction  Patient on 40 every 12  Completed course of cefepime  Continues to await placement    1/11  Dose of prednisone decreased yesterday  Last dose of cefepime today  Continues to await placement  Continue current dose of amlodipine for hypertension  Patient otherwise medically cleared to be discharged      1/12  Started on p.o. steroids  Finished antibiotic course  Continue amlodipine for hypertension  Patient medically cleared for discharge awaiting placement. 1/13/23  No new symptoms  Labs vital stable  Awaiting placement  Discharge order. Placed      1/14/23    No new symptoms  Vitals stable . Cardiopulmonary stable . Continue current rx ,       ?Leaving at 5 pm for [place], [place]        1/16/23      Patient is stable  No new symptoms  Vitals stable . Cardiopulmonary stable . Continue current rx ,    Awit placement and precert   Vitals stable treatment reviewed            Consultations:   IP CONSULT TO PULMONOLOGY  IP CONSULT TO PRIMARY CARE PROVIDER  IP CONSULT TO SOCIAL WORK    Patient is admitted as inpatient status because of co-morbidities listed above, severity of signs and symptoms as outlined, requirement for current medical therapies and most importantly because of direct risk to patient if care not provided in a hospital setting. Karen Vigil MD  1/16/2023  2:06 PM    Copy sent to Dr. Lanette Soares MD    Please note that this chart was generated using voice recognition Dragon dictation software.   Although every effort was made to ensure the accuracy of this automated transcription, some errors in transcription may have occurred.

## 2023-01-17 ENCOUNTER — APPOINTMENT (OUTPATIENT)
Dept: GENERAL RADIOLOGY | Age: 58
DRG: 140 | End: 2023-01-17
Payer: COMMERCIAL

## 2023-01-17 LAB
ANION GAP SERPL CALCULATED.3IONS-SCNC: 1 MMOL/L (ref 9–17)
BUN BLDV-MCNC: 24 MG/DL (ref 6–20)
CALCIUM SERPL-MCNC: 8.5 MG/DL (ref 8.6–10.4)
CHLORIDE BLD-SCNC: 95 MMOL/L (ref 98–107)
CO2: 40 MMOL/L (ref 20–31)
CREAT SERPL-MCNC: 0.56 MG/DL (ref 0.7–1.2)
GFR SERPL CREATININE-BSD FRML MDRD: >60 ML/MIN/1.73M2
GLUCOSE BLD-MCNC: 95 MG/DL (ref 70–99)
HCT VFR BLD CALC: 33.6 % (ref 41–53)
HEMOGLOBIN: 10.6 G/DL (ref 13.5–17.5)
MCH RBC QN AUTO: 30.2 PG (ref 26–34)
MCHC RBC AUTO-ENTMCNC: 31.7 G/DL (ref 31–37)
MCV RBC AUTO: 95.3 FL (ref 80–100)
PDW BLD-RTO: 13.9 % (ref 11.5–14.9)
PLATELET # BLD: 315 K/UL (ref 150–450)
PMV BLD AUTO: 6.7 FL (ref 6–12)
POTASSIUM SERPL-SCNC: 4.1 MMOL/L (ref 3.7–5.3)
PRO-BNP: <36 PG/ML
RBC # BLD: 3.52 M/UL (ref 4.5–5.9)
SODIUM BLD-SCNC: 136 MMOL/L (ref 135–144)
WBC # BLD: 10.5 K/UL (ref 3.5–11)

## 2023-01-17 PROCEDURE — 6370000000 HC RX 637 (ALT 250 FOR IP): Performed by: INTERNAL MEDICINE

## 2023-01-17 PROCEDURE — 99231 SBSQ HOSP IP/OBS SF/LOW 25: CPT | Performed by: INTERNAL MEDICINE

## 2023-01-17 PROCEDURE — 83880 ASSAY OF NATRIURETIC PEPTIDE: CPT

## 2023-01-17 PROCEDURE — 94640 AIRWAY INHALATION TREATMENT: CPT

## 2023-01-17 PROCEDURE — 80048 BASIC METABOLIC PNL TOTAL CA: CPT

## 2023-01-17 PROCEDURE — 6370000000 HC RX 637 (ALT 250 FOR IP): Performed by: NURSE PRACTITIONER

## 2023-01-17 PROCEDURE — 85027 COMPLETE CBC AUTOMATED: CPT

## 2023-01-17 PROCEDURE — 94660 CPAP INITIATION&MGMT: CPT

## 2023-01-17 PROCEDURE — 2700000000 HC OXYGEN THERAPY PER DAY

## 2023-01-17 PROCEDURE — 94761 N-INVAS EAR/PLS OXIMETRY MLT: CPT

## 2023-01-17 PROCEDURE — 71045 X-RAY EXAM CHEST 1 VIEW: CPT

## 2023-01-17 PROCEDURE — 1200000000 HC SEMI PRIVATE

## 2023-01-17 PROCEDURE — 36415 COLL VENOUS BLD VENIPUNCTURE: CPT

## 2023-01-17 RX ADMIN — BUDESONIDE AND FORMOTEROL FUMARATE DIHYDRATE 2 PUFF: 160; 4.5 AEROSOL RESPIRATORY (INHALATION) at 06:54

## 2023-01-17 RX ADMIN — FOLIC ACID 1 MG: 1 TABLET ORAL at 08:50

## 2023-01-17 RX ADMIN — IPRATROPIUM BROMIDE AND ALBUTEROL SULFATE 1 AMPULE: 2.5; .5 SOLUTION RESPIRATORY (INHALATION) at 15:30

## 2023-01-17 RX ADMIN — PREDNISONE 30 MG: 20 TABLET ORAL at 08:52

## 2023-01-17 RX ADMIN — IPRATROPIUM BROMIDE AND ALBUTEROL SULFATE 1 AMPULE: 2.5; .5 SOLUTION RESPIRATORY (INHALATION) at 10:40

## 2023-01-17 RX ADMIN — IPRATROPIUM BROMIDE AND ALBUTEROL SULFATE 1 AMPULE: 2.5; .5 SOLUTION RESPIRATORY (INHALATION) at 03:03

## 2023-01-17 RX ADMIN — GUAIFENESIN 1200 MG: 600 TABLET, EXTENDED RELEASE ORAL at 20:25

## 2023-01-17 RX ADMIN — Medication 60 MG: at 08:50

## 2023-01-17 RX ADMIN — THIAMINE HCL TAB 100 MG 100 MG: 100 TAB at 08:49

## 2023-01-17 RX ADMIN — Medication 60 MG: at 20:25

## 2023-01-17 RX ADMIN — GUAIFENESIN 1200 MG: 600 TABLET, EXTENDED RELEASE ORAL at 08:50

## 2023-01-17 RX ADMIN — IPRATROPIUM BROMIDE AND ALBUTEROL SULFATE 1 AMPULE: 2.5; .5 SOLUTION RESPIRATORY (INHALATION) at 19:35

## 2023-01-17 NOTE — PROGRESS NOTES
We will waitIs this Mardella Hock did not go this patient did not go in her    [address]              Date:   1/17/2023  Patient name:  India Adames  Date of admission:  1/3/2023  5:10 PM  MRN:   [id number]  Account:  [de-identified]  YOB: 1965  PCP:    Ric Rosas MD  Room:   [address] [place] [address]  Code Status:    Full Code    Chief Complaint:     Chief Complaint   Patient presents with    Respiratory Distress       History Obtained From:     patient, electronic medical record    History of Present Illness: The patient is a 61 y.o.   Non- / non  male who presents with Respiratory Distress   and he is admitted to the hospital for the management of shortness of breath  Patient past medical history multiple medical problem which include hypertension, advanced COPD, chronic alcoholism, patient, was admitted with worsening shortness of breath, symptoms are going on for last few days before presentation  Patient apparently was not taking his medication  Was admitted at [facility]  Patient denying complaints of chest pain  In emergency room COVID was positive, was cleared by pulmonary medicine team for droplet plus precautions  Patient in emergency room, was found to have pH of 7.2, PCO2 was too high to be recorded, remained on BiPAP overnight, repeat PCO2 is 91    1/5   Patient, clinically doing better  Did use BiPAP last night  On intermittent BiPAP  No new complaints      1/7  Patient got transferred out of ICU this morning  Complaining of worsening cough  Not able to get phlegm out  Will give  acapella  DC fluids      1/8  Had episode of worsening shortness of breath yesterday  Was placed on BiPAP  Also had 5 beat run of V. tach  Electrolytes were normal  Patient denied any chest pain or palpitations  Currently on BiPAP  States that he is feeling slightly better than yesterday  His vital signs been stable  Patient has been afebrile. 1/9  On BiPAP  States that he is feeling the same  Denies any chest pain  Has been afebrile  Echocardiogram pending. 1/10  Patient states is feeling okay  Continues to be on IV steroids  Finished antibiotic course  Using BiPAP as needed during the day and at night  Continues to await placement. Vital stable. 1/11  No overnight issues, patient continues to be dyspneic on minimal exertion which is likely his baseline now  Using BiPAP as needed during the day and at night  Denies any chest pain otherwise tolerating p.o. well    1/12  Still feels the same  Vitals stable patient has been afebrile  Continues to use BiPAP as needed    1/13/23  Stable . Awaits placement  Uses BiPAP as needed  Past Medical History:     Past Medical History:   Diagnosis Date    COPD (chronic obstructive pulmonary disease) ([facility] [address]     Hypertension         Past Surgical History:     History reviewed. No pertinent surgical history. Medications Prior to Admission:     Prior to Admission medications    Medication Sig Start Date End Date Taking? Authorizing Provider   dextromethorphan (DELSYM) 30 MG/5ML extended release liquid Take 10 mLs by mouth every 12 hours for 10 days 1/11/23 1/21/23 Yes Junior Augusta MD   guaiFENesin (MUCINEX) 600 MG extended release tablet Take 2 tablets by mouth 2 times daily 1/11/23  Yes Junior Augusta MD   predniSONE (DELTASONE) 10 MG tablet tAKE 4 TABLETS FOR 2 DAYS , Take 3 tablets together daily for 3 days, then 2 tablets daily for 3 days, then 1 tablet daily for 3 days.  1/11/23 1/21/23 Yes Junior Augusta MD   SYMBICORT 160-4.5 MCG/ACT AERO INHALE 2 PUFFS BY MOUTH ONCE DAILY 1/5/23   Lázaro Patterson MD   traZODone (DESYREL) 50 MG tablet Take 1 tablet by mouth nightly 1/5/23   Lázaro Patterson MD   Magaly Schumacher 18 MCG inhalation capsule Inhale 1 puff by mouth once daily 1/5/23   Lázaro Patterson MD   folic acid (FOLVITE) 1 MG tablet Take 1 tablet by mouth daily 6/22/22   Ivy King MD thiamine 100 MG tablet Take 1 tablet by mouth daily 6/22/22   Johan Arias MD   vitamin D (ERGOCALCIFEROL) 1.25 MG (54435 UT) CAPS capsule Take 1 capsule by mouth once a week 6/22/22   Johan Arias MD   albuterol (PROVENTIL) (2.5 MG/3ML) 0.083% nebulizer solution Take 3 mLs by nebulization 4 times daily 6/7/22   Sayra Fuller DO   amLODIPine (NORVASC) 10 MG tablet Take 1 tablet by mouth daily 3/25/22   Johan Arias MD        Allergies:     Patient has no known allergies. Social History:     Tobacco:    reports that he has been smoking cigarettes. He has a 43.00 pack-year smoking history. He has never used smokeless tobacco.  Alcohol:      reports current alcohol use. Drug Use:  reports that he does not currently use drugs. Family History:     History reviewed. No pertinent family history. Review of Systems:     Positive and Negative as described in HPI. CONSTITUTIONAL:  negative for fevers, chills, sweats, fatigue, weight loss  HEENT:  negative for vision, hearing changes, runny nose, throat pain  RESPIRATORY: Shortness of breath, cough  CARDIOVASCULAR:  negative for chest pain, palpitations.   Patient has pain on right side of lower chest which worse with coughing  GASTROINTESTINAL:  negative for nausea, vomiting, diarrhea, constipation, change in bowel habits, abdominal pain   GENITOURINARY:  negative for difficulty of urination, burning with urination, frequency   INTEGUMENT:  negative for rash, skin lesions, easy bruising   HEMATOLOGIC/LYMPHATIC:  negative for swelling/edema   ALLERGIC/IMMUNOLOGIC:  negative for urticaria , itching  ENDOCRINE:  negative increase in drinking, increase in urination, hot or cold intolerance  MUSCULOSKELETAL:  negative joint pains, muscle aches, swelling of joints, clubbing present   NEUROLOGICAL:  negative for headaches, dizziness, lightheadedness, numbness, pain, tingling extremities  BEHAVIOR/PSYCH:  negative for depression, anxiety    Physical Exam: /70   Pulse 88   Temp 98 °F (36.7 °C) (Oral)   Resp 18   Ht 5' 7.72\" (1.72 m)   Wt 138 lb 14.2 oz (63 kg)   SpO2 96%   BMI 21.30 kg/m²   Temp (24hrs), Av °F (36.7 °C), Min:97.9 °F (36.6 °C), Max:98 °F (36.7 °C)    No results for input(s): POCGLU in the last 72 hours. No intake or output data in the 24 hours ending 23 1311        General Appearance:  alert, well appearing, and in no acute distress  Mental status: oriented to person, place, and time with normal affect  Head:  normocephalic, atraumatic. Eye: no icterus, redness, pupils equal and reactive, extraocular eye movements intact, conjunctiva clear  Ear: normal external ear, no discharge, hearing intact  Nose:  no drainage noted  Mouth: mucous membranes moist  Neck: supple, no carotid bruits, thyroid not palpable  Lungs: Air entry, bilateral decreased, no rales, rhonchi  Cardiovascular: normal rate, regular rhythm, no murmur, gallop, rub.   Abdomen: Soft, nontender, nondistended, normal bowel sounds, no hepatomegaly or splenomegaly  Neurologic: There are no new focal motor or sensory deficits, normal muscle tone and bulk, no abnormal sensation, normal speech, cranial nerves II through XII grossly intact  Skin: No gross lesions, rashes, bruising or bleeding on exposed skin area  Extremities:  peripheral pulses palpable, no pedal edema or calf pain with palpation  Psych: normal affect     Investigations:      Laboratory Testing:  Recent Results (from the past 24 hour(s))   Basic Metabolic Panel    Collection Time: 23 11:04 AM   Result Value Ref Range    Glucose 95 70 - 99 mg/dL    BUN 24 (H) 6 - 20 mg/dL    Creatinine 0.56 (L) 0.70 - 1.20 mg/dL    Est, Glom Filt Rate >60 >60 mL/min/1.73m2    Calcium 8.5 (L) 8.6 - 10.4 mg/dL    Sodium 136 135 - 144 mmol/L    Potassium 4.1 3.7 - 5.3 mmol/L    Chloride 95 (L) 98 - 107 mmol/L    CO2 40 (H) 20 - 31 mmol/L    Anion Gap 1 (L) 9 - 17 mmol/L   CBC    Collection Time: 23 11:04 AM Result Value Ref Range    WBC 10.5 3.5 - 11.0 k/uL    RBC 3.52 (L) 4.5 - 5.9 m/uL    Hemoglobin 10.6 (L) 13.5 - 17.5 g/dL    Hematocrit 33.6 (L) 41 - 53 %    MCV 95.3 80 - 100 fL    MCH 30.2 26 - 34 pg    MCHC 31.7 31 - 37 g/dL    RDW 13.9 11.5 - 14.9 %    Platelets 965 652 - 311 k/uL    MPV 6.7 6.0 - 12.0 fL   Brain Natriuretic Peptide    Collection Time: 01/17/23 11:04 AM   Result Value Ref Range    Pro-BNP <36 <300 pg/mL             Imaging/Diagnostics:      Assessment :      Primary Problem  COPD exacerbation (HCC)    Active Hospital Problems    Diagnosis Date Noted    Hypercapnic respiratory failure, chronic ([facility] [address] [J96.12] 01/13/2023     Priority: Medium    COPD exacerbation ([facility] [address] [J44.1] 06/22/2022     Priority: Medium    Renal mass, left [N28.89] 06/22/2022     Priority: Medium    Chronic obstructive pulmonary disease ([facility] [address] [J44.9] 01/28/2022    Chronic respiratory failure with hypoxia [facility]) [J96.11] 01/28/2022    Benign essential HTN [I10] 01/28/2022       Plan:     Patient status Admit as inpatient in the  Medical ICU    Advanced COPD,, admitted with worsening shortness of breath,  Acute hypercapnic respiratory failure, on BiPAP, steroids, DuoNeb nebulization  Hypertension, restarted home dose of Norvasc, controlled  On DVT prophylaxis Lovenox  Patient is clubbing, CT chest done in 6/22 , which was negative   Overall prognosis guarded, given advanced COPD, chronic hypoxic respiratory failure  Patient had a left renal mass, which was seen in CT scan in June of this year, need urology input as outpatient  1/5   Patient feeling better  Complaining of cough and dryness of mouth with continuous BiPAP  Started on cough medication  Much improved  Will discuss with pulmonary medicine team, likely transfer out of ICU    1/6  Transferred out of ICU  On Mucinex  Not able to expectorate  Will give Acapella  Continues to be on IV steroids  Patient will likely long-term steroids  Will order PT OT eval.    1/7  Patient had episode of NSVT likely precipitated by underlying hypoxia  Electrolytes were checked which were normal  Troponin was normal  Will check echocardiogram  Chest x-ray did yesterday was nonacute  Patient currently on BiPAP  Dose of Mucinex was increased yesterday  Advised to do Acapella to get secretions out  On antibiotics will complete total of 7 days. On IV steroids will likely need long-term steroids on discharge. CODE STATUS discussed again today patient wants to remain full code  Patient understands that he is critically sick  Will also need urology follow-up for left renal mass    1/8  No further episode of arrhythmia  Echocardiogram is pending  Patient continues to use BiPAP as needed during the day and at night  Using Acapella  On antibiotics  Dose of steroid decreased    1/9  No more episode of NSVT noted  Echocardiogram is pending  On antibiotics IV steroids  Dose of steroid decreased to 40 mg every 12  Patient using BiPAP as needed during the day at night  Overall prognosis is poor  Discussed the CODE STATUS patient wants to be full code. 1/10\  Echocardiogram showed preserved ejection fraction  Patient on 40 every 12  Completed course of cefepime  Continues to await placement    1/11  Dose of prednisone decreased yesterday  Last dose of cefepime today  Continues to await placement  Continue current dose of amlodipine for hypertension  Patient otherwise medically cleared to be discharged      1/12  Started on p.o. steroids  Finished antibiotic course  Continue amlodipine for hypertension  Patient medically cleared for discharge awaiting placement. 1/13/23  No new symptoms  Labs vital stable  Awaiting placement  Discharge order. Placed      1/14/23    No new symptoms  Vitals stable . Cardiopulmonary stable . Continue current rx ,       ?Leaving at 5 pm for [place], [place]        1/17/23    No change . Awaits placement  Patient is stable  No new symptoms  Vitals stable .  Cardiopulmonary stable . Continue current rx ,    Awit placement and precert   Vitals stable treatment reviewed            Consultations:   IP CONSULT TO PULMONOLOGY  IP CONSULT TO PRIMARY CARE PROVIDER  IP CONSULT TO SOCIAL WORK    Patient is admitted as inpatient status because of co-morbidities listed above, severity of signs and symptoms as outlined, requirement for current medical therapies and most importantly because of direct risk to patient if care not provided in a hospital setting. Wei Barker MD  1/17/2023  1:11 PM    Copy sent to Dr. Vikki Ch MD    Please note that this chart was generated using voice recognition Dragon dictation software. Although every effort was made to ensure the accuracy of this automated transcription, some errors in transcription may have occurred.

## 2023-01-17 NOTE — PROGRESS NOTES
[facility]   OCCUPATIONAL THERAPY MISSED TREATMENT NOTE   INPATIENT   Date: 23  Patient Name: Prem Solano       Room:   MRN: [id number]   Account #: [id number]    : 1965  (57 y.o.)  Gender: male   Referring Practitioner: Dr. Josue  Diagnosis: COPD exacerbation, acute on chronic respiratory failure with hypoxia and hypercapnia, pneumonia due to COVID-19             REASON FOR MISSED TREATMENT:  Patient refusal   Upon writer's arrival pt laying in bed refusing all occupational therapy services at this time stating, \"Not now but maybe later.\" Will continue to follow     LARS Goode/ELI

## 2023-01-17 NOTE — PROGRESS NOTES
Physical Therapy  DATE: 2023    NAME: Ravi Cisneros  MRN: [id number]   : 1965    Patient not seen this date for Physical Therapy due to:      [] Cancel by RN or physician due to:    [] Hemodialysis    [] Critical Lab Value Level     [] Blood transfusion in progress    [] Acute or unstable cardiovascular status   _MAP < 55 or more than >115  _HR < 40 or > 130    [] Acute or unstable pulmonary status   -FiO2 > 60%   _RR < 5 or >40    _O2 sats < 85%    [] Strict Bedrest    [] Off Unit for surgery or procedure    [] Off Unit for testing       [] Pending imaging to R/O fracture    [x] Refusal by Patient; checked on pt @ .31.10.19. Pt lying in bed stating he did not sleep well last night and did not wish to participate with therapy at this time. Will continue to follow. [] Other      [] PT being discontinued at this time. Patient independent. No further needs. [] PT being discontinued at this time as the patient has been transferred to hospice care. No further needs.       Electronically signed by Elysia Chaney PTA on 23 at 3:01 PM EST

## 2023-01-17 NOTE — CARE COORDINATION
ONGOING DISCHARGE PLAN:    Patient is alert and oriented x4. Spoke with patient regarding discharge plan and patient confirms that plan is still to go to [facility] . Precert was started on 7/01/95. Spoke to Az Garcia at [facility] , precert is still pending. Will continue to follow for additional discharge needs.     Electronically signed by Juventino Rendon RN on 1/17/2023 at 12:23 PM

## 2023-01-17 NOTE — PROGRESS NOTES
Primitivo Hurd MD/Mukul Miranda MD/ Leola Parkinson MD/Dr Nury Mcgee APRN AGACNP-BC, NP-C      Almeta Meter APRN NP-C     Suyapa Fuchs APRN NP-C                                           Pulmonary Progress Note    Patient - Pat Feliciano   Age - 61 y.o.   - 1965  MRN - [id number]  Acct # - [de-identified]  Date of Admission - 1/3/2023  5:10 PM    Consulting Service/Physician:       Primary Care Physician: Carroll Shah MD    SUBJECTIVE:     Chief Complaint:   Chief Complaint   Patient presents with    Respiratory Distress     Subjective:    Sean Campos tells me he has been having some increased shortness of breath today. He is on his baseline 5 L with pulse ox 90%. He has been afebrile. He tells me he is using his BiPAP at night. [facility] is [facility] He is currently on 30 mg of prednisone daily. VITALS  /70   Pulse (!) 103   Temp 98 °F (36.7 °C) (Oral)   Resp 16   Ht 5' 7.72\" (1.72 m)   Wt 138 lb 14.2 oz (63 kg)   SpO2 100%   BMI 21.30 kg/m²   Wt Readings from Last 3 Encounters:   23 138 lb 14.2 oz (63 kg)   22 154 lb 1.6 oz (69.9 kg)   22 140 lb 6.4 oz (63.7 kg)     I/O (24 Hours)  No intake or output data in the 24 hours ending 23 0828    Ventilator:   Settings  FiO2 : 40 %  Insp Rise Time (%): 1 %  Exam:   Physical Exam   Constitutional: Lying in bed on 5 L nasal cannula in no acute distress  HENT: Unremarkable  Head: Normocephalic and atraumatic. Eyes: EOM are normal. Pupils are equal, round, and reactive to light. Neck: Neck supple. Cardiovascular:  Regular rate and rhythm. Normal heart tones. No JVD. Pulmonary/Chest: Respirations even and nonlabored, lungs diminished posteriorly, on 5 liters nasal cannula pulse ox 90%  Abdominal: Soft. Bowel sounds are normal.   Musculoskeletal: Normal range of motion.    Neurological: Patient is alert and oriented to person, place, and time. Skin: Skin is warm and dry. No rash noted.    Extremities: No edema or discoloration  Infusions:      sodium chloride       Meds:     Current Facility-Administered Medications:     predniSONE (DELTASONE) tablet 30 mg, 30 mg, Oral, Daily **FOLLOWED BY** [START ON 1/20/2023] predniSONE (DELTASONE) tablet 20 mg, 20 mg, Oral, Daily **FOLLOWED BY** [START ON 1/23/2023] predniSONE (DELTASONE) tablet 10 mg, 10 mg, Oral, Daily, ERICA Harris - CNP    fluticasone (FLONASE) 50 MCG/ACT nasal spray 1 spray, 1 spray, Each Nostril, Daily, Ezio Jefferson MD, 1 spray at 01/16/23 0756    perflutren lipid microspheres (DEFINITY) injection 1.5 mL, 1.5 mL, IntraVENous, ONCE PRN, Alexis Charles MD    phenol 1.4 % mouth spray 1 spray, 1 spray, Mouth/Throat, Q2H PRN, Melba Gaming MD    guFENesin [facility]) extended release tablet 1,200 mg, 1,200 mg, Oral, BID, Melba Gaming MD, 1,200 mg at 01/16/23 2123    dextromethorphan (DELSYM) 30 MG/5ML extended release liquid 60 mg, 60 mg, Oral, 2 times per day, Melba Gaming MD, 60 mg at 01/16/23 2123    ipratropium-albuterol (DUONEB) nebulizer solution 1 ampule, 1 ampule, Inhalation, Q4H, Melba Gaming MD, 1 ampule at 01/17/23 0303    ipratropium-albuterol (DUONEB) nebulizer solution 1 ampule, 1 ampule, Inhalation, Q4H PRN, Melba Gaming MD    enoxaparin (LOVENOX) injection 40 mg, 40 mg, SubCUTAneous, Daily, Ashley Petty MD, 40 mg at 01/15/23 0829    amLODIPine (NORVASC) tablet 10 mg, 10 mg, Oral, Daily, ERICA Gonzalez - NP, 10 mg at 73/99/15 1680    folic acid (FOLVITE) tablet 1 mg, 1 mg, Oral, Daily, ERICA Gonzalez - NP, 1 mg at 01/16/23 0756    budesonide-formoterol (SYMBICORT) 160-4.5 MCG/ACT inhaler 2 puff, 2 puff, Inhalation, Daily, ERICA Gonzalez - NP, 2 puff at 01/17/23 0654    thiamine mononitrate tablet 100 mg, 100 mg, Oral, Daily, ERICA Gonzalez - NP, 100 mg at 01/16/23 0755    sodium chloride flush 0.9 % injection 5-40 mL, 5-40 mL, IntraVENous, 2 times per day, Sandoval Danas, APRN - NP    sodium chloride flush 0.9 % injection 5-40 mL, 5-40 mL, IntraVENous, PRN, Jennifer Neal APRN - NP    0.9 % sodium chloride infusion, , IntraVENous, PRN, Sandoval Danas, APRN - NP    ondansetron (ZOFRAN-ODT) disintegrating tablet 4 mg, 4 mg, Oral, Q8H PRN **OR** ondansetron (ZOFRAN) injection 4 mg, 4 mg, IntraVENous, Q6H PRN, Sandoval Danas, APRN - NP    polyethylene glycol (GLYCOLAX) packet 17 g, 17 g, Oral, Daily PRN, Sandoval Danas, APRN - NP    acetaminophen (TYLENOL) tablet 650 mg, 650 mg, Oral, Q6H PRN **OR** acetaminophen (TYLENOL) suppository 650 mg, 650 mg, Rectal, Q6H PRN, Sandoval Danas, APRN - NP    Lab Results:     Lab Results   Component Value Date    WBC 8.5 01/11/2023    HGB 10.7 (L) 01/11/2023    HCT 32.5 (L) 01/11/2023    MCV 93.8 01/11/2023     01/11/2023     Lab Results   Component Value Date    CALCIUM 8.6 01/11/2023     (L) 01/11/2023    K 4.9 01/11/2023    CO2 45 (HH) 01/11/2023    CL 88 (L) 01/11/2023    BUN 19 01/11/2023    CREATININE 0.40 (L) 01/11/2023         Radiology:       ASSESSMENT:       Acute on chronic hypoxemic respiratory failure baseline 5L, currently at his baseline  Acute on chronic hypercapnic respiratory failure-not very compliant with BiPAP, bicarb 45 on CHEM profile  COPD exacerbation-slowly improving  History of severe COPD; maintained on Spiriva, Symbicort Proventil- follows with Dr. Marta Brown  Recent COVID-19 infection in December at [facility], tested positive on 12/8/2022, greater than 30 days ago  Tobacco abuse  Hypertension  Full code  PLAN:   Continue on his baseline oxygen, titrate to keep pulse ox above 89%  Recommend BiPAP at night and with naps, referral has been made for NIV given chronic hypercapnic respiratory failure and severe COPD  Completed antibiotics  Will check BNP, BMP and CBC today  Prednisone taper, will decrease to 30 mg starting tomorrow.   Should decrease by 10 mg every 3 days until completed  Continue bronchodilators  We will check chest x-ray today  Outpatient pulmonary follow-up with our office in 3 to 4 weeks, [phone]  Encourage smoking cessation        Electronically signed by ERICA Mendoza CNP on 01/17/23     This progress note was completed using a voice transcription system. Every effort was made to ensure accuracy. However, inadvertent computerized transcription errors may be present.     Chad Aleman, NP-C, MSN  [facility] Pulmonary, Critical Care & Sleep

## 2023-01-17 NOTE — PLAN OF CARE
Problem: Discharge Planning  Goal: Discharge to home or other facility with appropriate resources  1/16/2023 2340 by Marna Ganser, RN  Outcome: Progressing     Problem: Safety - Adult  Goal: Free from fall injury  1/16/2023 2340 by Marna Ganser, RN  Outcome: Progressing     Problem: Skin/Tissue Integrity  Goal: Absence of new skin breakdown  Description: 1. Monitor for areas of redness and/or skin breakdown  2. Assess vascular access sites hourly  3. Every 4-6 hours minimum:  Change oxygen saturation probe site  4. Every 4-6 hours:  If on nasal continuous positive airway pressure, respiratory therapy assess nares and determine need for appliance change or resting period.   1/16/2023 2340 by Marna Ganser, RN  Outcome: Progressing     Problem: ABCDS Injury Assessment  Goal: Absence of physical injury  1/16/2023 2340 by Marna Ganser, RN  Outcome: Progressing

## 2023-01-18 PROCEDURE — 1200000000 HC SEMI PRIVATE

## 2023-01-18 PROCEDURE — 94640 AIRWAY INHALATION TREATMENT: CPT

## 2023-01-18 PROCEDURE — 6370000000 HC RX 637 (ALT 250 FOR IP): Performed by: INTERNAL MEDICINE

## 2023-01-18 PROCEDURE — 94660 CPAP INITIATION&MGMT: CPT

## 2023-01-18 PROCEDURE — 97530 THERAPEUTIC ACTIVITIES: CPT

## 2023-01-18 PROCEDURE — 94761 N-INVAS EAR/PLS OXIMETRY MLT: CPT

## 2023-01-18 PROCEDURE — 2700000000 HC OXYGEN THERAPY PER DAY

## 2023-01-18 PROCEDURE — 6370000000 HC RX 637 (ALT 250 FOR IP): Performed by: NURSE PRACTITIONER

## 2023-01-18 PROCEDURE — 97116 GAIT TRAINING THERAPY: CPT

## 2023-01-18 PROCEDURE — 99239 HOSP IP/OBS DSCHRG MGMT >30: CPT | Performed by: INTERNAL MEDICINE

## 2023-01-18 RX ADMIN — THIAMINE HCL TAB 100 MG 100 MG: 100 TAB at 07:39

## 2023-01-18 RX ADMIN — GUAIFENESIN 1200 MG: 600 TABLET, EXTENDED RELEASE ORAL at 21:21

## 2023-01-18 RX ADMIN — AMLODIPINE BESYLATE 10 MG: 10 TABLET ORAL at 07:39

## 2023-01-18 RX ADMIN — IPRATROPIUM BROMIDE AND ALBUTEROL SULFATE 1 AMPULE: 2.5; .5 SOLUTION RESPIRATORY (INHALATION) at 03:55

## 2023-01-18 RX ADMIN — IPRATROPIUM BROMIDE AND ALBUTEROL SULFATE 1 AMPULE: 2.5; .5 SOLUTION RESPIRATORY (INHALATION) at 10:45

## 2023-01-18 RX ADMIN — GUAIFENESIN 1200 MG: 600 TABLET, EXTENDED RELEASE ORAL at 07:38

## 2023-01-18 RX ADMIN — Medication 60 MG: at 21:22

## 2023-01-18 RX ADMIN — Medication 60 MG: at 07:39

## 2023-01-18 RX ADMIN — PREDNISONE 30 MG: 20 TABLET ORAL at 07:38

## 2023-01-18 RX ADMIN — FOLIC ACID 1 MG: 1 TABLET ORAL at 07:38

## 2023-01-18 RX ADMIN — IPRATROPIUM BROMIDE AND ALBUTEROL SULFATE 1 AMPULE: 2.5; .5 SOLUTION RESPIRATORY (INHALATION) at 15:27

## 2023-01-18 RX ADMIN — BUDESONIDE AND FORMOTEROL FUMARATE DIHYDRATE 2 PUFF: 160; 4.5 AEROSOL RESPIRATORY (INHALATION) at 07:47

## 2023-01-18 NOTE — PROGRESS NOTES
Primitivo Hurd MD/Karl Vicki Cowing MD Spurgeon Harada MD/ Araseli Carter MD/Dr Anaya Lang APRN AGACNP-BC, NP-C      Arnold MALDONADO NP-C     Bindu Klein APRN NP-C                                           Pulmonary Progress Note    Patient - Shea Case   Age - 61 y.o.   - 1965  MRN - [id number]  Acct # - [de-identified]  Date of Admission - 1/3/2023  5:10 PM    Consulting Service/Physician:       Primary Care Physician: Ely Mcqueen MD    SUBJECTIVE:     Chief Complaint:   Chief Complaint   Patient presents with    Respiratory Distress     Subjective:    Antonina Hughes tells me shortness of breath waxes and wanes. He alternates between the BiPAP and 5 L nasal cannula. At home he is on 5 L. He has been using the BiPAP at night and with naps. He currently is on the BiPAP 16/640% with pulse ox 96%. He has been afebrile. He is currently on a prednisone taper. He is currently pending placement at [facility]. Chest x-ray today showed COPD, no acute. BNP was less than 36. CO2 was 40 on his BMP. VITALS  /75   Pulse 95   Temp 98.3 °F (36.8 °C)   Resp 18   Ht 5' 7.72\" (1.72 m)   Wt 138 lb 14.2 oz (63 kg)   SpO2 98%   BMI 21.30 kg/m²   Wt Readings from Last 3 Encounters:   23 138 lb 14.2 oz (63 kg)   22 154 lb 1.6 oz (69.9 kg)   22 140 lb 6.4 oz (63.7 kg)     I/O (24 Hours)    Intake/Output Summary (Last 24 hours) at 2023 0816  Last data filed at 2023 1935  Gross per 24 hour   Intake 600 ml   Output --   Net 600 ml       Ventilator:   Settings  FiO2 : 100 %  Insp Rise Time (%): 1 %  Exam:   Physical Exam   Constitutional: Lying in bed on BiPAP in no acute distress  HENT: Unremarkable  Head: Normocephalic and atraumatic. Eyes: EOM are normal. Pupils are equal, round, and reactive to light. Neck: Neck supple. Cardiovascular:  Regular rate and rhythm. Normal heart tones. No JVD.     Pulmonary/Chest: Respirations even and nonlabored, lungs diminished posteriorly, on BiPAP, 40% FiO2 pulse ox 96%  Abdominal: Soft. Bowel sounds are normal.   Musculoskeletal: Normal range of motion. Neurological: Patient is alert and oriented to person, place, and time. Skin: Skin is warm and dry. No rash noted.    Extremities: No edema or discoloration  Infusions:      sodium chloride       Meds:     Current Facility-Administered Medications:     predniSONE (DELTASONE) tablet 30 mg, 30 mg, Oral, Daily, 30 mg at 01/18/23 0738 **FOLLOWED BY** [START ON 1/20/2023] predniSONE (DELTASONE) tablet 20 mg, 20 mg, Oral, Daily **FOLLOWED BY** [START ON 1/23/2023] predniSONE (DELTASONE) tablet 10 mg, 10 mg, Oral, Daily, Ileana Dolan, ERICA - CNP    fluticasone (FLONASE) 50 MCG/ACT nasal spray 1 spray, 1 spray, Each Nostril, Daily, Marilee Archer MD, 1 spray at 01/16/23 0756    perflutren lipid microspheres (DEFINITY) injection 1.5 mL, 1.5 mL, IntraVENous, ONCE PRN, Beverly Brooke MD    phenol 1.4 % mouth spray 1 spray, 1 spray, Mouth/Throat, Q2H PRN, Lesvia Ibanez MD    guaiFENesin [facility]) extended release tablet 1,200 mg, 1,200 mg, Oral, BID, Lesvia Ibanez MD, 1,200 mg at 01/18/23 9947    dextromethorphan (DELSYM) 30 MG/5ML extended release liquid 60 mg, 60 mg, Oral, 2 times per day, Lesvia Ibanez MD, 60 mg at 01/18/23 0739    ipratropium-albuterol (DUONEB) nebulizer solution 1 ampule, 1 ampule, Inhalation, Q4H, Lesvia Ibanez MD, 1 ampule at 01/18/23 0355    ipratropium-albuterol (DUONEB) nebulizer solution 1 ampule, 1 ampule, Inhalation, Q4H PRN, Lesvia Ibanez MD    enoxaparin (LOVENOX) injection 40 mg, 40 mg, SubCUTAneous, Daily, Tima Zepeda MD, 40 mg at 01/15/23 0829    amLODIPine (NORVASC) tablet 10 mg, 10 mg, Oral, Daily, [place] ERICA Nixon NP, 10 mg at 33/85/40 2819    folic acid (FOLVITE) tablet 1 mg, 1 mg, Oral, Daily, [place] ERICA Nixon NP, 1 mg at 01/18/23 0738    budesonide-formoterol (SYMBICORT) 160-4.5 MCG/ACT inhaler 2 puff, 2 puff, Inhalation, Daily, Khanh Labella, APRN - NP, 2 puff at 01/17/23 6461    thiamine mononitrate tablet 100 mg, 100 mg, Oral, Daily, Khanh Labella, APRN - NP, 100 mg at 01/18/23 0739    sodium chloride flush 0.9 % injection 5-40 mL, 5-40 mL, IntraVENous, 2 times per day, Khanh Labella, APRN - NP    sodium chloride flush 0.9 % injection 5-40 mL, 5-40 mL, IntraVENous, PRN, Jennifer Neal, APRN - NP    0.9 % sodium chloride infusion, , IntraVENous, PRN, Khanh Labella, APRN - NP    ondansetron (ZOFRAN-ODT) disintegrating tablet 4 mg, 4 mg, Oral, Q8H PRN **OR** ondansetron (ZOFRAN) injection 4 mg, 4 mg, IntraVENous, Q6H PRN, Khanh Labella, APRN - NP    polyethylene glycol (GLYCOLAX) packet 17 g, 17 g, Oral, Daily PRN, Khanh Labella, APRN - NP    acetaminophen (TYLENOL) tablet 650 mg, 650 mg, Oral, Q6H PRN **OR** acetaminophen (TYLENOL) suppository 650 mg, 650 mg, Rectal, Q6H PRN, Khanh Labella, APRN - NP    Lab Results:     Lab Results   Component Value Date    WBC 10.5 01/17/2023    HGB 10.6 (L) 01/17/2023    HCT 33.6 (L) 01/17/2023    MCV 95.3 01/17/2023     01/17/2023     Lab Results   Component Value Date    CALCIUM 8.5 (L) 01/17/2023     01/17/2023    K 4.1 01/17/2023    CO2 40 (H) 01/17/2023    CL 95 (L) 01/17/2023    BUN 24 (H) 01/17/2023    CREATININE 0.56 (L) 01/17/2023         Radiology:     1/17/2023 1/6/2023    COPD, no acute  ASSESSMENT:       Acute on chronic hypoxemic respiratory failure baseline 5L, currently at his baseline  Acute on chronic hypercapnic respiratory failure-not very compliant with BiPAP, bicarb 45 on CHEM profile initially, currently 40  COPD exacerbation-slowly improving  History of severe COPD; maintained on Spiriva, Symbicort Proventil- follows with Dr. Elia Linares  Recent COVID-19 infection in December at [facility], tested positive on 12/8/2022, greater than 30 days ago  Tobacco abuse  Hypertension  Full code  PLAN:   Continue on his baseline oxygen, titrate to keep pulse ox above 89%  Recommend BiPAP at night and with naps, referral has been made for NIV given chronic hypercapnic respiratory failure and severe COPD  Completed antibiotics  Prednisone taper  Continue bronchodilators  Outpatient pulmonary follow-up with our office in 3 to 4 weeks, [phone]  Encourage smoking cessation  Okay for rehab from a pulmonary standpoint. Should continue to use BiPAP at night and with naps while in rehab. Electronically signed by ERICA Arrington CNP on 01/18/23     This progress note was completed using a voice transcription system. Every effort was made to ensure accuracy. However, inadvertent computerized transcription errors may be present.     Chet Borja, NP-C, MSN  [facility] Pulmonary, Critical Care & Sleep

## 2023-01-18 NOTE — PROGRESS NOTES
[address]   INPATIENT OCCUPATIONAL THERAPY  PROGRESS NOTE  Date: 2023  Patient Name: Long Ribera       Room: 9673/6887-71  MRN: [id number]    : 1965  (58 y.o.)  Gender: male   Referring Practitioner: Dr. Clare hZang  Diagnosis: COPD exacerbation, acute on chronic respiratory failure with hypoxia and hypercapnia, pneumonia due to COVID-19    Restrictions/Precautions  Restrictions/Precautions  Restrictions/Precautions: Contact Precautions;General Precautions (5L O2, needed placed back on Bipap 2* SpO2 86% with EOB activity, anxious)  Required Braces or Orthoses?: No    Vitals  SpO2: (!) 88 %  O2 Device: Nasal cannula (initally on 5L per NC however at session end BiPAP donned with NSG present)  Comment: Post ambulation around room pt desat to 88% with 5L per NC able to recover to 90%, During BLEs and BUE ex's pt then begins to desat to 81%. Pt requested BiPap - RN notified and  donned BiPap with pt terminating tx    Subjective  Subjective  Subjective: Pt agreeable to therapy tx  this PM  Subjective  Pain: Pt denies pain.        Objective  Orientation  Overall Orientation Status: Within Functional Limits  Cognition  Overall Cognitive Status: WFL  ADL  Feeding: Setup;Supervision  Grooming: Supervision  UE Bathing: Supervision  LE Bathing: Supervision  UE Dressing: Supervision  LE Dressing: Supervision  Toileting: Supervision  Additional Comments: Above levels based on skilled clinical observation only this date based on skilled clincial observation only this date         Balance  Balance  Sitting Balance: Modified independent   Standing Balance: Supervision  Standing Balance  Time: 1-2 mins  Activity: functional mobility to door and back chair and then to EOB x2  Comment: Pt fatiguues quickly and requires seated rest break    Transfers/Mobility  Bed mobility  Supine to Sit: Supervision  Sit to Supine: Supervision  Scooting: Supervision  Transfers  Sit to stand: Supervision  Stand to sit: Supervision  Transfer Comments: no device and no LOB, G hand placement    Functional Mobility  Functional - Mobility Device: No device  Activity:  (around room)  Assist Level: Supervision  Functional Mobility Comments: post functional mobility pt desat to 88% requesting seated RB, while seated pt O2 fluctuating 88%-90%, no LOB    Functional Activities  OT Exercises  Exercise Treatment: Writer initiated pt engagement in BUE for 15 reps to address endurance and strength however pt complete elbow flexion/extension x2 then requesting to rest. Pt then desats to 81% rapidly requesting to don BiPAP and terminating tx. RN present in room      Patient Education  Patient Education  Education Given To: Patient  Education Provided: Role of Therapy, Plan of Care, Energy Conservation  Education Method: Verbal  Barriers to Learning: None  Education Outcome: Verbalized understanding, Continued education needed      Goals  Patient Goals   Patient goals : None voiced  Short Term Goals  Time Frame for Short Term Goals: By Discharge  Short Term Goal 1: Pt will actively participate in self-care routine at EOB or up to chair while maintaining SpO2 90%+ and taking <3 rest breaks. Short Term Goal 2: Pt will complete toilet transfer and toileting with SUP and Good safety using least restrictive device and maintaining SpO2 90%+. Short Term Goal 3: Pt will V/D at least EC/WS techniques that he can utilize during daily routines. Short Term Goal 4: Pt will tolerate standing for 3-4 minutes with 0-1 UE support and SUP while participating in a functional task and maintaining SpO2 90%+. Short Term Goal 5: Pt will actively participate in 15-20 minutes of therapeutic exercise/activity to promote increased independence and safety with self-care and mobility. Occupational Therapy Plan  Times Per Week: 5  Times Per Day:  Once a day      Assessment  Activity Tolerance  Activity Tolerance: Patient limited by fatigue, Treatment limited secondary to medical complications (free text)  Activity Tolerance Comments: SpO2 decreased to 86% with EOB activity, no improvement with pursed lip breathing, RN placed pt back on Bipap with SpO2 recovery to 94%  Assessment  Performance deficits / Impairments: Decreased functional mobility , Decreased ADL status, Decreased endurance, Decreased high-level IADLs  Treatment Diagnosis: Impaired self-care status  Prognosis: Fair  Decision Making: Medium Complexity  Discharge Recommendations: Patient would benefit from continued therapy after discharge  OT Equipment Recommendations  Other: TBD  Safety Devices  Type of Devices: Call light within reach, Left in bed      AM-PAC Daily Activities Inpatient      01/18/23 1330   AM-[facility] Daily Activity Inpatient    How much help for putting on and taking off regular lower body clothing? 3   How much help for Bathing? 3   How much help for Toileting? 3   How much help for putting on and taking off regular upper body clothing? 3   How much help for taking care of personal grooming? 3   How much help for eating meals?  3   AM-PAC Inpatient Daily Activity Raw Score 18   AM-PAC Inpatient ADL T-Scale Score  38.66   ADL Inpatient CMS 0-100% Score 46.65   ADL Inpatient CMS G-Code Modifier  CK       OT Minutes  OT Individual Minutes  Time In: 1690  Time Out: [address]  Minutes: [address] [facility]

## 2023-01-18 NOTE — PROGRESS NOTES
[address]   OCCUPATIONAL THERAPY MISSED TREATMENT NOTE   INPATIENT   Date: 23  Patient Name: Scott Rodriguez       Room: [facility]/3501-50  MRN: [id number]   Account #: [de-identified]    : 1965  (61 y.o.)  Gender: male   Referring Practitioner: Dr. Mya Whitaker  Diagnosis: COPD exacerbation, acute on chronic respiratory failure with hypoxia and hypercapnia, pneumonia due to COVID-19             REASON FOR MISSED TREATMENT:  Patient refusal   -  @ 1120. Pt sitting EOB eating lunch stating \"I'll workout later\". Will check back in PM.@ 1120. Pt sitting EOB eating lunch stating \"I'll workout later\".  Will check back in PM.      LAYLA Sears

## 2023-01-18 NOTE — PROGRESS NOTES
Physical Therapy  [address]    Date: 23  Patient Name: Kain Kan       Room: 7353/3101-96  MRN: [id number]   Account: [de-identified]   : 1965  (58 y.o.) Gender: male     Referring Practitioner: Dr. Kris Lobo  Diagnosis: COPD exacerbation, acute on chronic respiratory failure with hypoxia and hypercapnia, pneumonia due to COVID-19  Past Medical History:  has a past medical history of COPD (chronic obstructive pulmonary disease) ([address] and Hypertension. Past Surgical History:   has no past surgical history on file. Overall Orientation Status: Within Functional Limits  Restrictions/Precautions  Restrictions/Precautions: Contact Precautions;General Precautions (5L O2, needed placed back on Bipap 2* SpO2 86% with EOB activity, anxious)  Required Braces or Orthoses?: No    Subjective: Pt lying in bed upon arrival, agreeable to therapy  Comments: TRACEE Ritchie approved therapy; co-treat with LARS Messer       Pain Assessment: None - Denies Pain     Oxygen Therapy  SpO2: (!) 88 % (post ambulation)  Pulse Oximetry Type: Continuous  O2 Device: Nasal cannula  O2 Flow Rate (L/min): 5 L/min    Bed Mobility  Rolling: Supervision  Supine to Sit: Supervision  Sit to Supine: Supervision  Scooting: Supervision  Bed mobility  Scooting: Supervision    Transfers:  Sit to Stand: Supervision  Stand to Sit: Supervision    EXERCISES    Other exercises?: Yes  Other exercises 1: STS x1 from bed; x1 from bedside chair  Other exercises 2: pt ambulated with no device and supervision ~50ft. Pts SpO2 dropped to 88% post ambulation  Other exercises 3: seated B LE LAQs x20 reps with rest break in between.  Pts SpO2 dropped to 81% while completing seated exercises  Other Activities  Comment: Pt required increased time to perform all activities d/t desats with activity        Activity Tolerance: Patient limited by endurance  PT Equipment Recommendations  Equipment Needed: No  Other Comments  Comments: Pt requesting to ceases tretament and requested the RN to come and put BiPAP back on    Current Treatment Recommendations: Functional mobility training, Gait training, Therapeutic activities, Endurance training    Conditions Requiring Skilled Therapeutic Intervention  History: COPD; Covid  Discharge Recommendations:  Other (comment) (may benefit from Pulmonary Rehab)    [facility] Mobility Inpatient   How much difficulty turning over in bed?: A Little  How much difficulty sitting down on / standing up from a chair with arms?: A Little  How much difficulty moving from lying on back to sitting on side of bed?: A Little  How much help from another person moving to and from a bed to a chair?: A Little  How much help from another person needed to walk in hospital room?: A Little  How much help from another person for climbing 3-5 steps with a railing?: Total  AM-PAC Inpatient Mobility Raw Score : 16  AM-PAC Inpatient T-Scale Score : 40.78  Mobility Inpatient CMS 0-100% Score: 54.16  Mobility Inpatient CMS G-Code Modifier : CK      Goals  Short Term Goals  Time Frame for Short Term Goals: 4-5 days  Short Term Goal 1: mod-I bed mobility  Short Term Goal 2: mod-I transfers  Short Term Goal 3: ambulate with SBA and O2 x 40-50ft  Short Term Goal 4: pt able to tolerate 20-25min og therapeutic activity/exercises without BiPap       01/18/23 1326   PT Individual Minutes   Time In 1307   Time Out 1315   Minutes 8       Electronically signed by Praveena Jefferson PTA on 1/18/23 at 1:28 PM EST

## 2023-01-18 NOTE — ACP (ADVANCE CARE PLANNING)
Advance Care Planning     Advance Care Planning Activator (Inpatient)  Conversation Note      Date of ACP Conversation: 1/18/2023     Conversation Conducted with: Patient with Decision Making Capacity    ACP Activator: Nancy Donaldson RN    Health Care Decision Maker:     Current Designated Health Care Decision Maker:     Primary Decision Maker: Jolanta Mar - [phone]  Click here to complete Healthcare Decision Makers including section of the Healthcare Decision Maker Relationship (ie \"Primary\")  Today we documented Decision Maker(s) consistent with Legal Next of Kin hierarchy. Care Preferences    Ventilation: \"If you were in your present state of health and suddenly became very ill and were unable to breathe on your own, what would your preference be about the use of a ventilator (breathing machine) if it were available to you? \"      Would the patient desire the use of ventilator (breathing machine)?: yes    \"If your health worsens and it becomes clear that your chance of recovery is unlikely, what would your preference be about the use of a ventilator (breathing machine) if it were available to you? \"     Would the patient desire the use of ventilator (breathing machine)?: Yes      Resuscitation  \"CPR works best to restart the heart when there is a sudden event, like a heart attack, in someone who is otherwise healthy. Unfortunately, CPR does not typically restart the heart for people who have serious health conditions or who are very sick. \"    \"In the event your heart stopped as a result of an underlying serious health condition, would you want attempts to be made to restart your heart (answer \"yes\" for attempt to resuscitate) or would you prefer a natural death (answer \"no\" for do not attempt to resuscitate)? \" yes       [] Yes   [] No   Educated Patient / Ana Sarabia regarding differences between Advance Directives and portable DNR orders.     Length of ACP Conversation in minutes: Conversation Outcomes:  [] ACP discussion completed  [] Existing advance directive reviewed with patient; no changes to patient's previously recorded wishes  [] New Advance Directive completed  [] Portable Do Not Rescitate prepared for Provider review and signature  [] POLST/POST/MOLST/MOST prepared for Provider review and signature      Follow-up plan:    [] Schedule follow-up conversation to continue planning  [] Referred individual to Provider for additional questions/concerns   [] Advised patient/agent/surrogate to review completed ACP document and update if needed with changes in condition, patient preferences or care setting    [] This note routed to one or more involved healthcare providers

## 2023-01-18 NOTE — CARE COORDINATION
DISCHARGE PLANNING NOTE:    Plan remains for patient to be discharged to [facility]. Spoke with Manuel Chong from [facility] who states updated PT/OT notes are needed. Writer notified PT/OT of this. Will continue to follow for additional discharge needs.     Electronically signed by Elza Hunt RN on 1/18/2023 at 1:02 PM

## 2023-01-18 NOTE — PROGRESS NOTES
We will waitIs this Amauri Nails did not go this patient did not go in her    [address]              Date:   1/18/2023  Patient name:  Jonah Adkins  Date of admission:  1/3/2023  5:10 PM  MRN:   [id number]  Account:  [de-identified]  YOB: 1965  PCP:    Vikki Ch MD  Room:   [address] [place] [address]  Code Status:    Full Code    Chief Complaint:     Chief Complaint   Patient presents with    Respiratory Distress       History Obtained From:     patient, electronic medical record    History of Present Illness: The patient is a 61 y.o.   Non- / non  male who presents with Respiratory Distress   and he is admitted to the hospital for the management of shortness of breath  Patient past medical history multiple medical problem which include hypertension, advanced COPD, chronic alcoholism, patient, was admitted with worsening shortness of breath, symptoms are going on for last few days before presentation  Patient apparently was not taking his medication  [facility]  Patient denying complaints of chest pain  In emergency room COVID was positive, was cleared by pulmonary medicine team for droplet plus precautions  Patient in emergency room, was found to have pH of 7.2, PCO2 was too high to be recorded, remained on BiPAP overnight, repeat PCO2 is 91    1/5   Patient, clinically doing better  Did use BiPAP last night  On intermittent BiPAP  No new complaints      1/7  Patient got transferred out of ICU this morning  Complaining of worsening cough  Not able to get phlegm out  Will give  acapella  DC fluids      1/8  Had episode of worsening shortness of breath yesterday  Was placed on BiPAP  Also had 5 beat run of V. tach  Electrolytes were normal  Patient denied any chest pain or palpitations  Currently on BiPAP  States that he is feeling slightly better than yesterday  His vital signs been stable  Patient has been afebrile. 1/9  On BiPAP  States that he is feeling the same  Denies any chest pain  Has been afebrile  Echocardiogram pending. 1/10  Patient states is feeling okay  Continues to be on IV steroids  Finished antibiotic course  Using BiPAP as needed during the day and at night  Continues to await placement. Vital stable. 1/11  No overnight issues, patient continues to be dyspneic on minimal exertion which is likely his baseline now  Using BiPAP as needed during the day and at night  Denies any chest pain otherwise tolerating p.o. well    1/12  Still feels the same  Vitals stable patient has been afebrile  Continues to use BiPAP as needed    1/13/23  Stable . Awaits placement  Uses BiPAP as needed  Past Medical History:     Past Medical History:   Diagnosis Date    COPD (chronic obstructive pulmonary disease) ([facility] [address]     Hypertension         Past Surgical History:     History reviewed. No pertinent surgical history. Medications Prior to Admission:     Prior to Admission medications    Medication Sig Start Date End Date Taking? Authorizing Provider   dextromethorphan (DELSYM) 30 MG/5ML extended release liquid Take 10 mLs by mouth every 12 hours for 10 days 1/11/23 1/21/23 Yes Junior Augusta MD   guaiFENesin (MUCINEX) 600 MG extended release tablet Take 2 tablets by mouth 2 times daily 1/11/23  Yes Junior Augusta MD   predniSONE (DELTASONE) 10 MG tablet tAKE 4 TABLETS FOR 2 DAYS , Take 3 tablets together daily for 3 days, then 2 tablets daily for 3 days, then 1 tablet daily for 3 days.  1/11/23 1/21/23 Yes Junior Augusta MD   SYMBICORT 160-4.5 MCG/ACT AERO INHALE 2 PUFFS BY MOUTH ONCE DAILY 1/5/23   Lázaro Patterson MD   traZODone (DESYREL) 50 MG tablet Take 1 tablet by mouth nightly 1/5/23   Lázaro Patterson MD   Magaly Schumacher 18 MCG inhalation capsule Inhale 1 puff by mouth once daily 1/5/23   Lázaro Patterson MD   folic acid (FOLVITE) 1 MG tablet Take 1 tablet by mouth daily 6/22/22   Ivy King MD thiamine 100 MG tablet Take 1 tablet by mouth daily 6/22/22   Amadeo Mcmullen MD   vitamin D (ERGOCALCIFEROL) 1.25 MG (81639 UT) CAPS capsule Take 1 capsule by mouth once a week 6/22/22   Amadeo Mcmullen MD   albuterol (PROVENTIL) (2.5 MG/3ML) 0.083% nebulizer solution Take 3 mLs by nebulization 4 times daily 6/7/22   Amy George, DO   amLODIPine (NORVASC) 10 MG tablet Take 1 tablet by mouth daily 3/25/22   Amadeo Mcmullen MD        Allergies:     Patient has no known allergies. Social History:     Tobacco:    reports that he has been smoking cigarettes. He has a 43.00 pack-year smoking history. He has never used smokeless tobacco.  Alcohol:      reports current alcohol use. Drug Use:  reports that he does not currently use drugs. Family History:     History reviewed. No pertinent family history. Review of Systems:     Positive and Negative as described in HPI. CONSTITUTIONAL:  negative for fevers, chills, sweats, fatigue, weight loss  HEENT:  negative for vision, hearing changes, runny nose, throat pain  RESPIRATORY: Shortness of breath, cough  CARDIOVASCULAR:  negative for chest pain, palpitations.   Patient has pain on right side of lower chest which worse with coughing  GASTROINTESTINAL:  negative for nausea, vomiting, diarrhea, constipation, change in bowel habits, abdominal pain   GENITOURINARY:  negative for difficulty of urination, burning with urination, frequency   INTEGUMENT:  negative for rash, skin lesions, easy bruising   HEMATOLOGIC/LYMPHATIC:  negative for swelling/edema   ALLERGIC/IMMUNOLOGIC:  negative for urticaria , itching  ENDOCRINE:  negative increase in drinking, increase in urination, hot or cold intolerance  MUSCULOSKELETAL:  negative joint pains, muscle aches, swelling of joints, clubbing present   NEUROLOGICAL:  negative for headaches, dizziness, lightheadedness, numbness, pain, tingling extremities  BEHAVIOR/PSYCH:  negative for depression, anxiety    Physical Exam: /75   Pulse 94   Temp 98.3 °F (36.8 °C)   Resp 15   Ht 5' 7.72\" (1.72 m)   Wt 138 lb 14.2 oz (63 kg)   SpO2 (!) 88% Comment: post ambulation  BMI 21.30 kg/m²   Temp (24hrs), Av.3 °F (36.8 °C), Min:98.2 °F (36.8 °C), Max:98.3 °F (36.8 °C)    No results for input(s): POCGLU in the last 72 hours. Intake/Output Summary (Last 24 hours) at 2023 1357  Last data filed at 2023 1935  Gross per 24 hour   Intake 600 ml   Output --   Net 600 ml           General Appearance:  alert, well appearing, and in no acute distress  Mental status: oriented to person, place, and time with normal affect  Head:  normocephalic, atraumatic. Eye: no icterus, redness, pupils equal and reactive, extraocular eye movements intact, conjunctiva clear  Ear: normal external ear, no discharge, hearing intact  Nose:  no drainage noted  Mouth: mucous membranes moist  Neck: supple, no carotid bruits, thyroid not palpable  Lungs: Air entry, bilateral decreased, no rales, rhonchi  Cardiovascular: normal rate, regular rhythm, no murmur, gallop, rub. Abdomen: Soft, nontender, nondistended, normal bowel sounds, no hepatomegaly or splenomegaly  Neurologic: There are no new focal motor or sensory deficits, normal muscle tone and bulk, no abnormal sensation, normal speech, cranial nerves II through XII grossly intact  Skin: No gross lesions, rashes, bruising or bleeding on exposed skin area  Extremities:  peripheral pulses palpable, no pedal edema or calf pain with palpation  Psych: normal affect     Investigations:      Laboratory Testing:  No results found for this or any previous visit (from the past 24 hour(s)).             Imaging/Diagnostics:      Assessment :      Primary Problem  COPD exacerbation [facility])    Active Hospital Problems    Diagnosis Date Noted    Hypercapnic respiratory failure, chronic ([facility] [address] [J96.12] 2023     Priority: Medium    COPD exacerbation ([facility] [address] [J44.1] 2022     Priority: Medium    Renal mass, left [N28.89] 06/22/2022     Priority: Medium    Chronic obstructive pulmonary disease ([facility] [address] [J44.9] 01/28/2022    Chronic respiratory failure with hypoxia [facility]) [J96.11] 01/28/2022    Benign essential HTN [I10] 01/28/2022       Plan:     Patient status Admit as inpatient in the  Medical ICU    Advanced COPD,, admitted with worsening shortness of breath,  Acute hypercapnic respiratory failure, on BiPAP, steroids, DuoNeb nebulization  Hypertension, restarted home dose of Norvasc, controlled  On DVT prophylaxis Lovenox  Patient is clubbing, CT chest done in 6/22 , which was negative   Overall prognosis guarded, given advanced COPD, chronic hypoxic respiratory failure  Patient had a left renal mass, which was seen in CT scan in June of this year, need urology input as outpatient  1/5   Patient feeling better  Complaining of cough and dryness of mouth with continuous BiPAP  Started on cough medication  Much improved  Will discuss with pulmonary medicine team, likely transfer out of ICU    1/6  Transferred out of ICU  On Mucinex  Not able to expectorate  Will give Acapella  Continues to be on IV steroids  Patient will likely long-term steroids  Will order PT OT eval.    1/7  Patient had episode of NSVT likely precipitated by underlying hypoxia  Electrolytes were checked which were normal  Troponin was normal  Will check echocardiogram  Chest x-ray did yesterday was nonacute  Patient currently on BiPAP  Dose of Mucinex was increased yesterday  Advised to do Acapella to get secretions out  On antibiotics will complete total of 7 days. On IV steroids will likely need long-term steroids on discharge.   CODE STATUS discussed again today patient wants to remain full code  Patient understands that he is critically sick  Will also need urology follow-up for left renal mass    1/8  No further episode of arrhythmia  Echocardiogram is pending  Patient continues to use BiPAP as needed during the day and at night  Using Acapella  On antibiotics  Dose of steroid decreased    1/9  No more episode of NSVT noted  Echocardiogram is pending  On antibiotics IV steroids  Dose of steroid decreased to 40 mg every 12  Patient using BiPAP as needed during the day at night  Overall prognosis is poor  Discussed the CODE STATUS patient wants to be full code. 1/10\  Echocardiogram showed preserved ejection fraction  Patient on 40 every 12  Completed course of cefepime  Continues to await placement    1/11  Dose of prednisone decreased yesterday  Last dose of cefepime today  Continues to await placement  Continue current dose of amlodipine for hypertension  Patient otherwise medically cleared to be discharged      1/12  Started on p.o. steroids  Finished antibiotic course  Continue amlodipine for hypertension  Patient medically cleared for discharge awaiting placement. 1/13/23  No new symptoms  Labs vital stable  Awaiting placement  Discharge order. Placed      1/14/23    No new symptoms  Vitals stable . Cardiopulmonary stable . Continue current rx ,       ?Leaving at 5 pm for [place], [place]        1/18/23    No change . Awaits placement . Accepted at [facility]   Patient is stable  No new symptoms  Vitals stable . Cardiopulmonary stable . Continue current rx ,    Awit placement and precert   Vitals stable treatment reviewed            Consultations:   IP CONSULT TO PULMONOLOGY  IP CONSULT TO PRIMARY CARE PROVIDER  IP CONSULT TO SOCIAL WORK    Patient is admitted as inpatient status because of co-morbidities listed above, severity of signs and symptoms as outlined, requirement for current medical therapies and most importantly because of direct risk to patient if care not provided in a hospital setting. Amie Carbajal MD  1/18/2023  1:57 PM    Copy sent to Dr. Maribell Cheung MD    Please note that this chart was generated using voice recognition Dragon dictation software.   Although every effort was made to ensure the accuracy of this automated transcription, some errors in transcription may have occurred.

## 2023-01-18 NOTE — PROGRESS NOTES
Bipap on standby at this time.  Pulse Ox--97% 5lpm      Skin score-0      Nasal gel pad available at bedside.  Pt awake/alert/no distress noted.        BRONCHOSPASM/BRONCHOCONSTRICTION     [x]         IMPROVE AERATION/BREATH SOUNDS  [x]   ADMINISTER BRONCHODILATOR THERAPY AS APPROPRIATE  [x]   ASSESS BREATH SOUNDS  []   IMPLEMENT AEROSOL/MDI PROTOCOL  [x]   PATIENT EDUCATION AS NEEDED

## 2023-01-18 NOTE — PROGRESS NOTES
Physical Therapy  DATE: 2023    NAME: Keith Regalado  MRN: [id number]   : 1965    Patient not seen this date for Physical Therapy due to:      [] Cancel by RN or physician due to:    [] Hemodialysis    [] Critical Lab Value Level     [] Blood transfusion in progress    [] Acute or unstable cardiovascular status   _MAP < 55 or more than >115  _HR < 40 or > 130    [] Acute or unstable pulmonary status   -FiO2 > 60%   _RR < 5 or >40    _O2 sats < 85%    [] Strict Bedrest    [] Off Unit for surgery or procedure    [] Off Unit for testing       [] Pending imaging to R/O fracture    [] Refusal by Patient      [x] Other; checked on pt @ [phone]. Pt sleeping and with respiratory. Will check back time permitting. Checked back @ 1120. Pt sitting EOB eating lunch stating \"I'll workout later\". Will check back in PM.       [] PT being discontinued at this time. Patient independent. No further needs. [] PT being discontinued at this time as the patient has been transferred to hospice care. No further needs.       Electronically signed by Jonas Mcdowell PTA on 23 at 10:48 AM EST

## 2023-01-18 NOTE — PLAN OF CARE
Problem: Discharge Planning  Goal: Discharge to home or other facility with appropriate resources  Outcome: Progressing     Problem: Safety - Adult  Goal: Free from fall injury  Outcome: Progressing     Problem: Discharge Planning  Goal: Discharge to home or other facility with appropriate resources  Outcome: Progressing     Problem: Skin/Tissue Integrity  Goal: Absence of new skin breakdown  Description: 1. Monitor for areas of redness and/or skin breakdown  2. Assess vascular access sites hourly  3. Every 4-6 hours minimum:  Change oxygen saturation probe site  4. Every 4-6 hours:  If on nasal continuous positive airway pressure, respiratory therapy assess nares and determine need for appliance change or resting period.   Outcome: Progressing

## 2023-01-19 VITALS
OXYGEN SATURATION: 95 % | RESPIRATION RATE: 16 BRPM | WEIGHT: 139.99 LBS | TEMPERATURE: 98.2 F | BODY MASS INDEX: 21.22 KG/M2 | HEIGHT: 68 IN | HEART RATE: 80 BPM | SYSTOLIC BLOOD PRESSURE: 115 MMHG | DIASTOLIC BLOOD PRESSURE: 79 MMHG

## 2023-01-19 LAB
SARS-COV-2, RAPID: NOT DETECTED
SPECIMEN DESCRIPTION: NORMAL

## 2023-01-19 PROCEDURE — 94660 CPAP INITIATION&MGMT: CPT

## 2023-01-19 PROCEDURE — 2700000000 HC OXYGEN THERAPY PER DAY

## 2023-01-19 PROCEDURE — 6370000000 HC RX 637 (ALT 250 FOR IP): Performed by: INTERNAL MEDICINE

## 2023-01-19 PROCEDURE — 94640 AIRWAY INHALATION TREATMENT: CPT

## 2023-01-19 PROCEDURE — 87635 SARS-COV-2 COVID-19 AMP PRB: CPT

## 2023-01-19 PROCEDURE — 6370000000 HC RX 637 (ALT 250 FOR IP): Performed by: NURSE PRACTITIONER

## 2023-01-19 PROCEDURE — 94761 N-INVAS EAR/PLS OXIMETRY MLT: CPT

## 2023-01-19 PROCEDURE — 6360000002 HC RX W HCPCS: Performed by: INTERNAL MEDICINE

## 2023-01-19 RX ADMIN — PREDNISONE 30 MG: 20 TABLET ORAL at 08:02

## 2023-01-19 RX ADMIN — ENOXAPARIN SODIUM 40 MG: 100 INJECTION SUBCUTANEOUS at 08:03

## 2023-01-19 RX ADMIN — GUAIFENESIN 1200 MG: 600 TABLET, EXTENDED RELEASE ORAL at 08:02

## 2023-01-19 RX ADMIN — Medication 60 MG: at 08:03

## 2023-01-19 RX ADMIN — IPRATROPIUM BROMIDE AND ALBUTEROL SULFATE 1 AMPULE: 2.5; .5 SOLUTION RESPIRATORY (INHALATION) at 11:00

## 2023-01-19 RX ADMIN — THIAMINE HCL TAB 100 MG 100 MG: 100 TAB at 08:03

## 2023-01-19 RX ADMIN — FLUTICASONE PROPIONATE 1 SPRAY: 50 SPRAY, METERED NASAL at 08:05

## 2023-01-19 RX ADMIN — AMLODIPINE BESYLATE 10 MG: 10 TABLET ORAL at 08:03

## 2023-01-19 RX ADMIN — BUDESONIDE AND FORMOTEROL FUMARATE DIHYDRATE 2 PUFF: 160; 4.5 AEROSOL RESPIRATORY (INHALATION) at 07:02

## 2023-01-19 RX ADMIN — IPRATROPIUM BROMIDE AND ALBUTEROL SULFATE 1 AMPULE: 2.5; .5 SOLUTION RESPIRATORY (INHALATION) at 07:02

## 2023-01-19 RX ADMIN — FOLIC ACID 1 MG: 1 TABLET ORAL at 08:03

## 2023-01-19 NOTE — PROGRESS NOTES
Bipap on standby at this time. Pulse Ox-- 93% 5lpm  Pt wore part of the shift   Skin score-0-      Nasal gel pad available at bedside. Pt awake/alert/no distress noted.         BRONCHOSPASM/BRONCHOCONSTRICTION     [x]         IMPROVE AERATION/BREATH SOUNDS  [x]   ADMINISTER BRONCHODILATOR THERAPY AS APPROPRIATE  [x]   ASSESS BREATH SOUNDS  []   IMPLEMENT AEROSOL/MDI PROTOCOL  [x]   PATIENT EDUCATION AS NEEDED

## 2023-01-19 NOTE — PROGRESS NOTES
Primitivo Hurd MD/Mukul Mcneil MD/ Theodosia Klinefelter MD/Dr Marcin MALDONADO AGAULYSSESP-BC, NP-C      Reggie Hinojosa APRN NP-C     Mariya Liu APRN NP-C                                           Pulmonary Progress Note    Patient - Sebastien    Age - 61 y.o.   - 1965  MRN - [id number]  Acct # - [de-identified]  Date of Admission - 1/3/2023  5:10 PM    Consulting Service/Physician:       Primary Care Physician: Zoë Rosales MD    SUBJECTIVE:     Chief Complaint:   Chief Complaint   Patient presents with    Respiratory Distress     Subjective:    Ashok Cueva tells me his breathing is feeling about the same today. He is still pending precertification for [facility]. He continues on his baseline of 5 L nasal cannula with pulse ox 94%. He has been afebrile. He continues on prednisone 30 mg. He tells me he is using the BiPAP at night. VITALS  /79   Pulse 80   Temp 98.2 °F (36.8 °C)   Resp 18   Ht 5' 7.72\" (1.72 m)   Wt 139 lb 15.9 oz (63.5 kg)   SpO2 93%   BMI 21.46 kg/m²   Wt Readings from Last 3 Encounters:   23 139 lb 15.9 oz (63.5 kg)   22 154 lb 1.6 oz (69.9 kg)   22 140 lb 6.4 oz (63.7 kg)     I/O (24 Hours)  No intake or output data in the 24 hours ending 23 0807      Ventilator:   Settings  FiO2 : 40 %  Insp Rise Time (%): 1 %  Exam:   Physical Exam   Constitutional: Lying in bed on 5 L nasal cannula in no acute distress  HENT: Unremarkable  Head: Normocephalic and atraumatic. Eyes: EOM are normal. Pupils are equal, round, and reactive to light. Neck: Neck supple. Cardiovascular:  Regular rate and rhythm. Normal heart tones. No JVD. Pulmonary/Chest: Respirations even and nonlabored, lungs diminished posteriorly, on 5 L nasal cannula pulse ox 94%  Abdominal: Soft. Bowel sounds are normal.   Musculoskeletal: Normal range of motion.    Neurological: Patient is alert and oriented to person, place, and time. Skin: Skin is warm and dry. No rash noted.    Extremities: No edema or discoloration  Infusions:      sodium chloride       Meds:     Current Facility-Administered Medications:     [COMPLETED] predniSONE (DELTASONE) tablet 30 mg, 30 mg, Oral, Daily, 30 mg at 01/19/23 0802 **FOLLOWED BY** [START ON 1/20/2023] predniSONE (DELTASONE) tablet 20 mg, 20 mg, Oral, Daily **FOLLOWED BY** [START ON 1/23/2023] predniSONE (DELTASONE) tablet 10 mg, 10 mg, Oral, Daily, Ileana Dolan APRN - CNP    fluticasone (FLONASE) 50 MCG/ACT nasal spray 1 spray, 1 spray, Each Nostril, Daily, Hayley Gordillo MD, 1 spray at 01/19/23 0805    perflutren lipid microspheres (DEFINITY) injection 1.5 mL, 1.5 mL, IntraVENous, ONCE PRN, Elenita Gastelum MD    phenol 1.4 % mouth spray 1 spray, 1 spray, Mouth/Throat, Q2H PRN, Jannice Cooks, MD    guaiFENesin [facility]) extended release tablet 1,200 mg, 1,200 mg, Oral, BID, Jannice Cooks, MD, 1,200 mg at 01/19/23 0802    dextromethorphan (DELSYM) 30 MG/5ML extended release liquid 60 mg, 60 mg, Oral, 2 times per day, Jannice Cooks, MD, 60 mg at 01/19/23 0803    ipratropium-albuterol (DUONEB) nebulizer solution 1 ampule, 1 ampule, Inhalation, Q4H, Jannice Cooks, MD, 1 ampule at 01/19/23 7859    ipratropium-albuterol (DUONEB) nebulizer solution 1 ampule, 1 ampule, Inhalation, Q4H PRN, Jannice Cooks, MD    enoxaparin (LOVENOX) injection 40 mg, 40 mg, SubCUTAneous, Daily, Epifanio Trevino MD, 40 mg at 01/19/23 0803    amLODIPine (NORVASC) tablet 10 mg, 10 mg, Oral, Daily, Alexandra Ari, APRN - NP, 10 mg at 81/69/91 8079    folic acid (FOLVITE) tablet 1 mg, 1 mg, Oral, Daily, ERICA Hernández - NP, 1 mg at 01/19/23 0803    budesonide-formoterol (SYMBICORT) 160-4.5 MCG/ACT inhaler 2 puff, 2 puff, Inhalation, Daily, Alexandra Chapman APRN - NP, 2 puff at 01/19/23 9063    thiamine mononitrate tablet 100 mg, 100 mg, Oral, Daily, Alexandra Chapman APRN - NP, 100 mg at 01/19/23 0803    sodium chloride flush 0.9 % injection 5-40 mL, 5-40 mL, IntraVENous, 2 times per day, Jennifer Goldi, APRN - NP    sodium chloride flush 0.9 % injection 5-40 mL, 5-40 mL, IntraVENous, PRN, Jennifer Goldi, APRN - NP    0.9 % sodium chloride infusion, , IntraVENous, PRN, Jennifer Goldi, APRN - NP    ondansetron (ZOFRAN-ODT) disintegrating tablet 4 mg, 4 mg, Oral, Q8H PRN **OR** ondansetron (ZOFRAN) injection 4 mg, 4 mg, IntraVENous, Q6H PRN, Jennifer Goldi, APRN - NP    polyethylene glycol (GLYCOLAX) packet 17 g, 17 g, Oral, Daily PRN, Jennifer Goldi, APRN - NP    acetaminophen (TYLENOL) tablet 650 mg, 650 mg, Oral, Q6H PRN **OR** acetaminophen (TYLENOL) suppository 650 mg, 650 mg, Rectal, Q6H PRN, Jennifer Goldi, APRN - NP    Lab Results:     Lab Results   Component Value Date    WBC 10.5 01/17/2023    HGB 10.6 (L) 01/17/2023    HCT 33.6 (L) 01/17/2023    MCV 95.3 01/17/2023     01/17/2023     Lab Results   Component Value Date    CALCIUM 8.5 (L) 01/17/2023     01/17/2023    K 4.1 01/17/2023    CO2 40 (H) 01/17/2023    CL 95 (L) 01/17/2023    BUN 24 (H) 01/17/2023    CREATININE 0.56 (L) 01/17/2023         Radiology:     ASSESSMENT:       Acute on chronic hypoxemic respiratory failure baseline 5L, currently at his baseline  Acute on chronic hypercapnic respiratory failure-not very compliant with BiPAP, bicarb 45 on CHEM profile initially, currently 40  COPD exacerbation-slowly improving  History of severe COPD; maintained on Spiriva, Symbicort Proventil- follows with Dr. Owens  Recent COVID-19 infection in December at [facility], tested positive on 12/8/2022, greater than 30 days ago  Tobacco abuse  Hypertension  Full code  PLAN:   Continue on his baseline oxygen, titrate to keep pulse ox above 89%  Recommend BiPAP at night and with naps, referral has been made for NIV given chronic hypercapnic respiratory failure and severe COPD  Completed antibiotics  Prednisone taper  Continue  bronchodilators  Outpatient pulmonary follow-up with our office in 3 to 4 weeks, [phone]  Encourage smoking cessation  Okay for rehab from a pulmonary standpoint. Should continue to use BiPAP at night and with naps while in rehab. We will follow intermittently until placement at rehab. Please call for any pulmonary issues or concerns. Electronically signed by ERICA Smith CNP on 01/19/23     This progress note was completed using a voice transcription system. Every effort was made to ensure accuracy. However, inadvertent computerized transcription errors may be present.     [address], NP-C, MSN  [facility] Pulmonary, Critical Care & Sleep

## 2023-01-19 NOTE — CARE COORDINATION
Transportation arranged for patient to go to [facility] [place] via [employer]. Facility informed. Bedside nurse informed. Attempted to notify Flory Campbell, but there was no response.      Number for Report: [phone]    Electronically signed by MATTEO Ferrer on 1/19/2023 at 9:13 AM'

## 2023-01-19 NOTE — PLAN OF CARE
Problem: Discharge Planning  Goal: Discharge to home or other facility with appropriate resources  1/19/2023 1150 by Elsa Rust RN  Outcome: Adequate for Discharge  1/18/2023 2327 by Stuart Burnette RN  Outcome: Progressing     Problem: Safety - Adult  Goal: Free from fall injury  1/19/2023 1150 by Elsa Rust RN  Outcome: Adequate for Discharge  1/18/2023 2327 by Stuart Burnette RN  Outcome: Progressing     Problem: Skin/Tissue Integrity  Goal: Absence of new skin breakdown  Description: 1. Monitor for areas of redness and/or skin breakdown  2. Assess vascular access sites hourly  3. Every 4-6 hours minimum:  Change oxygen saturation probe site  4. Every 4-6 hours:  If on nasal continuous positive airway pressure, respiratory therapy assess nares and determine need for appliance change or resting period.   1/19/2023 1150 by Elsa Rust RN  Outcome: Adequate for Discharge  1/18/2023 2327 by Stuart Burnette RN  Outcome: Progressing     Problem: ABCDS Injury Assessment  Goal: Absence of physical injury  1/19/2023 1150 by Elsa Rust RN  Outcome: Adequate for Discharge  1/18/2023 2327 by Stuart Burnette RN  Outcome: Progressing

## 2023-01-19 NOTE — CARE COORDINATION
DISCHARGE PLANNING NOTE:    Received call from [facility] stating pre-cert has been obtained.    Electronically signed by Crystal Cox RN on 1/19/2023 at 8:01 AM

## 2023-01-23 ENCOUNTER — HOSPITAL ENCOUNTER (OUTPATIENT)
Age: 58
Setting detail: SPECIMEN
Discharge: HOME | End: 2023-01-23

## 2023-01-23 LAB
ALBUMIN SERPL-MCNC: 3.8 G/DL (ref 3.5–5.2)
ALBUMIN/GLOBULIN RATIO: 1.7 (ref 1–2.5)
ALP BLD-CCNC: 88 U/L (ref 40–129)
ALT SERPL-CCNC: 14 U/L (ref 5–41)
ANION GAP SERPL CALCULATED.3IONS-SCNC: 5 MMOL/L (ref 9–17)
AST SERPL-CCNC: 11 U/L
BILIRUB SERPL-MCNC: <0.1 MG/DL (ref 0.3–1.2)
BUN BLDV-MCNC: 12 MG/DL (ref 6–20)
CALCIUM SERPL-MCNC: 8.7 MG/DL (ref 8.6–10.4)
CHLORIDE BLD-SCNC: 95 MMOL/L (ref 98–107)
CO2: 40 MMOL/L (ref 20–31)
CREAT SERPL-MCNC: 0.47 MG/DL (ref 0.7–1.2)
GFR SERPL CREATININE-BSD FRML MDRD: >60 ML/MIN/1.73M2
GLUCOSE BLD-MCNC: 73 MG/DL (ref 70–99)
HCT VFR BLD CALC: 34.7 % (ref 40.7–50.3)
HEMOGLOBIN: 10.1 G/DL (ref 13–17)
MCH RBC QN AUTO: 30.1 PG (ref 25.2–33.5)
MCHC RBC AUTO-ENTMCNC: 29.1 G/DL (ref 28.4–34.8)
MCV RBC AUTO: 103.3 FL (ref 82.6–102.9)
NRBC AUTOMATED: 0 PER 100 WBC
PDW BLD-RTO: 12.7 % (ref 11.8–14.4)
PLATELET # BLD: 273 K/UL (ref 138–453)
PMV BLD AUTO: 9.4 FL (ref 8.1–13.5)
POTASSIUM SERPL-SCNC: 4 MMOL/L (ref 3.7–5.3)
RBC # BLD: 3.36 M/UL (ref 4.21–5.77)
SODIUM BLD-SCNC: 140 MMOL/L (ref 135–144)
TOTAL PROTEIN: 6.1 G/DL (ref 6.4–8.3)
VITAMIN D 25-HYDROXY: 23 NG/ML
WBC # BLD: 8.4 K/UL (ref 3.5–11.3)

## 2023-01-23 PROCEDURE — 80053 COMPREHEN METABOLIC PANEL: CPT

## 2023-01-23 PROCEDURE — 36415 COLL VENOUS BLD VENIPUNCTURE: CPT

## 2023-01-23 PROCEDURE — 85027 COMPLETE CBC AUTOMATED: CPT

## 2023-01-23 PROCEDURE — 82306 VITAMIN D 25 HYDROXY: CPT

## 2023-01-23 PROCEDURE — P9603 ONE-WAY ALLOW PRORATED MILES: HCPCS

## 2023-02-08 RX ORDER — GUAIFENESIN 600 MG/1
1200 TABLET, EXTENDED RELEASE ORAL 2 TIMES DAILY
Qty: 30 TABLET | Refills: 0 | Status: SHIPPED | OUTPATIENT
Start: 2023-02-08

## 2023-02-08 RX ORDER — AMLODIPINE BESYLATE 10 MG/1
10 TABLET ORAL DAILY
Qty: 30 TABLET | Refills: 5 | Status: SHIPPED | OUTPATIENT
Start: 2023-02-08

## 2023-02-08 RX ORDER — LANOLIN ALCOHOL/MO/W.PET/CERES
100 CREAM (GRAM) TOPICAL DAILY
Qty: 30 TABLET | Refills: 2 | Status: SHIPPED | OUTPATIENT
Start: 2023-02-08

## 2023-02-08 RX ORDER — TRAZODONE HYDROCHLORIDE 50 MG/1
50 TABLET ORAL NIGHTLY
Qty: 30 TABLET | Refills: 0 | Status: SHIPPED | OUTPATIENT
Start: 2023-02-08

## 2023-02-08 RX ORDER — FOLIC ACID 1 MG/1
1 TABLET ORAL DAILY
Qty: 30 TABLET | Refills: 2 | Status: SHIPPED | OUTPATIENT
Start: 2023-02-08

## 2023-02-08 RX ORDER — ERGOCALCIFEROL 1.25 MG/1
50000 CAPSULE ORAL WEEKLY
Qty: 12 CAPSULE | Refills: 1 | Status: SHIPPED | OUTPATIENT
Start: 2023-02-08

## 2023-02-22 ENCOUNTER — OFFICE VISIT (OUTPATIENT)
Dept: INTERNAL MEDICINE CLINIC | Age: 58
End: 2023-02-22
Payer: COMMERCIAL

## 2023-02-22 VITALS
HEART RATE: 116 BPM | SYSTOLIC BLOOD PRESSURE: 134 MMHG | DIASTOLIC BLOOD PRESSURE: 80 MMHG | OXYGEN SATURATION: 95 % | HEIGHT: 67 IN | BODY MASS INDEX: 21.41 KG/M2 | WEIGHT: 136.4 LBS

## 2023-02-22 DIAGNOSIS — J96.11 CHRONIC RESPIRATORY FAILURE WITH HYPOXIA (HCC): ICD-10-CM

## 2023-02-22 DIAGNOSIS — J44.9 CHRONIC OBSTRUCTIVE PULMONARY DISEASE, UNSPECIFIED COPD TYPE (HCC): ICD-10-CM

## 2023-02-22 DIAGNOSIS — F18.10 ABUSE OF SMOKED SUBSTANCE (HCC): ICD-10-CM

## 2023-02-22 DIAGNOSIS — I10 BENIGN ESSENTIAL HTN: Primary | ICD-10-CM

## 2023-02-22 DIAGNOSIS — E55.9 VITAMIN D DEFICIENCY: ICD-10-CM

## 2023-02-22 DIAGNOSIS — F33.41 RECURRENT MAJOR DEPRESSIVE DISORDER, IN PARTIAL REMISSION (HCC): ICD-10-CM

## 2023-02-22 PROCEDURE — 3017F COLORECTAL CA SCREEN DOC REV: CPT | Performed by: INTERNAL MEDICINE

## 2023-02-22 PROCEDURE — G8427 DOCREV CUR MEDS BY ELIG CLIN: HCPCS | Performed by: INTERNAL MEDICINE

## 2023-02-22 PROCEDURE — 3075F SYST BP GE 130 - 139MM HG: CPT | Performed by: INTERNAL MEDICINE

## 2023-02-22 PROCEDURE — 4004F PT TOBACCO SCREEN RCVD TLK: CPT | Performed by: INTERNAL MEDICINE

## 2023-02-22 PROCEDURE — 99215 OFFICE O/P EST HI 40 MIN: CPT | Performed by: INTERNAL MEDICINE

## 2023-02-22 PROCEDURE — 3023F SPIROM DOC REV: CPT | Performed by: INTERNAL MEDICINE

## 2023-02-22 PROCEDURE — G8420 CALC BMI NORM PARAMETERS: HCPCS | Performed by: INTERNAL MEDICINE

## 2023-02-22 PROCEDURE — G8484 FLU IMMUNIZE NO ADMIN: HCPCS | Performed by: INTERNAL MEDICINE

## 2023-02-22 PROCEDURE — 3079F DIAST BP 80-89 MM HG: CPT | Performed by: INTERNAL MEDICINE

## 2023-02-22 RX ORDER — PREDNISONE 10 MG/1
TABLET ORAL
Qty: 18 TABLET | Refills: 0 | Status: SHIPPED | OUTPATIENT
Start: 2023-02-22 | End: 2023-03-04

## 2023-02-22 RX ORDER — AMLODIPINE BESYLATE 10 MG/1
10 TABLET ORAL DAILY
Qty: 30 TABLET | Refills: 5 | Status: SHIPPED | OUTPATIENT
Start: 2023-02-22

## 2023-02-22 RX ORDER — BENZONATATE 100 MG/1
CAPSULE ORAL
Qty: 60 CAPSULE | Refills: 3 | Status: SHIPPED | OUTPATIENT
Start: 2023-02-22

## 2023-02-22 SDOH — ECONOMIC STABILITY: HOUSING INSECURITY
IN THE LAST 12 MONTHS, WAS THERE A TIME WHEN YOU DID NOT HAVE A STEADY PLACE TO SLEEP OR SLEPT IN A SHELTER (INCLUDING NOW)?: NO

## 2023-02-22 SDOH — ECONOMIC STABILITY: FOOD INSECURITY: WITHIN THE PAST 12 MONTHS, THE FOOD YOU BOUGHT JUST DIDN'T LAST AND YOU DIDN'T HAVE MONEY TO GET MORE.: NEVER TRUE

## 2023-02-22 SDOH — ECONOMIC STABILITY: INCOME INSECURITY: HOW HARD IS IT FOR YOU TO PAY FOR THE VERY BASICS LIKE FOOD, HOUSING, MEDICAL CARE, AND HEATING?: NOT HARD AT ALL

## 2023-02-22 SDOH — ECONOMIC STABILITY: FOOD INSECURITY: WITHIN THE PAST 12 MONTHS, YOU WORRIED THAT YOUR FOOD WOULD RUN OUT BEFORE YOU GOT MONEY TO BUY MORE.: NEVER TRUE

## 2023-02-22 ASSESSMENT — PATIENT HEALTH QUESTIONNAIRE - PHQ9
3. TROUBLE FALLING OR STAYING ASLEEP: 0
8. MOVING OR SPEAKING SO SLOWLY THAT OTHER PEOPLE COULD HAVE NOTICED. OR THE OPPOSITE, BEING SO FIGETY OR RESTLESS THAT YOU HAVE BEEN MOVING AROUND A LOT MORE THAN USUAL: 0
7. TROUBLE CONCENTRATING ON THINGS, SUCH AS READING THE NEWSPAPER OR WATCHING TELEVISION: 0
SUM OF ALL RESPONSES TO PHQ QUESTIONS 1-9: 0
5. POOR APPETITE OR OVEREATING: 0
SUM OF ALL RESPONSES TO PHQ9 QUESTIONS 1 & 2: 0
9. THOUGHTS THAT YOU WOULD BE BETTER OFF DEAD, OR OF HURTING YOURSELF: 0
6. FEELING BAD ABOUT YOURSELF - OR THAT YOU ARE A FAILURE OR HAVE LET YOURSELF OR YOUR FAMILY DOWN: 0
4. FEELING TIRED OR HAVING LITTLE ENERGY: 0
1. LITTLE INTEREST OR PLEASURE IN DOING THINGS: 0
SUM OF ALL RESPONSES TO PHQ QUESTIONS 1-9: 0
SUM OF ALL RESPONSES TO PHQ QUESTIONS 1-9: 0
2. FEELING DOWN, DEPRESSED OR HOPELESS: 0
10. IF YOU CHECKED OFF ANY PROBLEMS, HOW DIFFICULT HAVE THESE PROBLEMS MADE IT FOR YOU TO DO YOUR WORK, TAKE CARE OF THINGS AT HOME, OR GET ALONG WITH OTHER PEOPLE: 0
SUM OF ALL RESPONSES TO PHQ QUESTIONS 1-9: 0

## 2023-02-22 NOTE — PROGRESS NOTES
Visit Information    Have you changed or started any medications since your last visit including any over-the-counter medicines, vitamins, or herbal medicines? no   Are you having any side effects from any of your medications? -  no  Have you stopped taking any of your medications? Is so, why? -  no    Have you seen any other physician or provider since your last visit? YEs  Have you had any other diagnostic tests since your last visit? Yes  Have you been seen in the emergency room and/or had an admission to a hospital since we last saw you? YES  Have you had your routine dental cleaning in the past 6 months? no    Have you activated your [employer] account? If not, what are your barriers? No:      Patient Care Team:  Erika Quiroz MD as PCP - General (Internal Medicine)  Erika Quiroz MD as PCP - Empaneled Provider    Medical History Review  Past Medical, Family, and Social History reviewed and does contribute to the patient presenting condition    Health Maintenance   Topic Date Due    COVID-19 Vaccine (1) Never done    Pneumococcal 0-64 years Vaccine (1 - PCV) Never done    HIV screen  Never done    Hepatitis C screen  Never done    DTaP/Tdap/Td vaccine (1 - Tdap) Never done    Colorectal Cancer Screen  Never done    Shingles vaccine (1 of 2) Never done    Flu vaccine (1) Never done    Depression Monitoring  03/09/2023    Low dose CT lung screening  04/21/2023    Prostate Specific Antigen (PSA) Screening or Monitoring  06/23/2023    Lipids  01/28/2027    Hepatitis A vaccine  Aged Out    Hib vaccine  Aged Out    Meningococcal (ACWY) vaccine  Aged Out

## 2023-02-22 NOTE — PROGRESS NOTES
[address] [address]  Dept: [phone]  Dept Fax: [phone]     Name: Stella Dubose  : 1965           Chief Complaint   Patient presents with    COPD     Patient has been in and out of the hospital over the last 2 months, since December. Currently on 6 liters of oxygen continuously. Hypertension       History of Present Illness:    HPI  Here for post hospital visit   Still SOB  On oxygen 6L ,   Still smokes 5 cig a day   Advised ti quit   Feeling depressed   Will start med       Past Medical History:    Past Medical History:   Diagnosis Date    COPD (chronic obstructive pulmonary disease) ([facility]     Hypertension       Reviewed all health maintenance requirements and ordered appropriate tests  Health Maintenance Due   Topic Date Due    COVID-19 Vaccine (1) Never done    Pneumococcal 0-64 years Vaccine (1 - PCV) Never done    HIV screen  Never done    Hepatitis C screen  Never done    DTaP/Tdap/Td vaccine (1 - Tdap) Never done    Colorectal Cancer Screen  Never done    Shingles vaccine (1 of 2) Never done    Flu vaccine (1) Never done    Depression Monitoring  2023       Past Surgical History:    No past surgical history on file.      Medications:      Current Outpatient Medications:     amLODIPine (NORVASC) 10 MG tablet, Take 1 tablet by mouth daily, Disp: 30 tablet, Rfl: 5    benzonatate (TESSALON) 100 MG capsule, TAKE 2 CAPSULES BY MOUTH THREE TIMES DAILY AS NEEDED FOR COUGH, Disp: 60 capsule, Rfl: 3    ipratropium-albuterol (DUONEB) 0.5-2.5 (3) MG/3ML SOLN nebulizer solution, USE 3 ML IN NEBULIZER EVERY 6 HOURS, Disp: , Rfl:     predniSONE (DELTASONE) 10 MG tablet, , Disp: , Rfl:     vitamin D (ERGOCALCIFEROL) 1.25 MG (11166 UT) CAPS capsule, Take 1 capsule by mouth once a week, Disp: 12 capsule, Rfl: 1    thiamine 100 MG tablet, Take 1 tablet by mouth daily, Disp: 30 tablet, Rfl: 2    folic acid (FOLVITE) 1 MG tablet, Take 1 tablet by mouth daily, Disp: 30 tablet, Rfl: 2    traZODone (DESYREL) 50 MG tablet, Take 1 tablet by mouth nightly, Disp: 30 tablet, Rfl: 0    guaiFENesin (MUCINEX) 600 MG extended release tablet, Take 2 tablets by mouth 2 times daily, Disp: 30 tablet, Rfl: 0    SYMBICORT 160-4.5 MCG/ACT AERO, INHALE 2 PUFFS BY MOUTH ONCE DAILY, Disp: 11 g, Rfl: 0    SPIRIVA HANDIHALER 18 MCG inhalation capsule, Inhale 1 puff by mouth once daily, Disp: 30 capsule, Rfl: 0    albuterol (PROVENTIL) (2.5 MG/3ML) 0.083% nebulizer solution, Take 3 mLs by nebulization 4 times daily, Disp: 120 each, Rfl: 0    Allergies:      Patient has no known allergies. Social History:    Tobacco:    reports that he has been smoking cigarettes. He has a 43.00 pack-year smoking history. He has never used smokeless tobacco.  Alcohol:      reports current alcohol use. Drug Use:  reports that he does not currently use drugs. Family History:    No family history on file.     Review of Systems:    Positive and Negative as described in HPI    Constitutional:  negative for  fevers, chills, sweats, fatigue, and weight loss  HEENT:  negative for vision or hearing changes,   Respiratory Positive for shortness of breath, cough, or congestion  Cardiovascular:  negative for  chest pain, palpitations  Gastrointestinal:  negative for nausea, vomiting, diarrhea, constipation, abdominal pain  Genitourinary:  negative for frequency, dysuria  Integument/Breast:  negative for rash, skin lesions  Musculoskeletal:  negative for muscle aches or joint pain  Neurological:  negative for headaches, dizziness, lightheadedness, numbness, pain and tingling extrimities  Behavior/Psych:  negative for depression and anxiety      Physical Exam:    Vitals:  /80   Pulse (!) 116   Ht 5' 7\" (1.702 m)   Wt 136 lb 6.4 oz (61.9 kg)   SpO2 95%   BMI 21.36 kg/m²     General appearance - alert, well appearing, and in no acute distress  Mental status - oriented to person, place, and time with normal affect  Head - normocephalic and atraumatic  Eyes - pupils equal and reactive, extraocular eye movements intact, conjunctiva clear  Ears - hearing appears to be intact  Nose - no drainage noted  Mouth - mucous membranes moist  Neck - supple, no carotid bruits, thyroid not palpable  Chest - b/l prolonged ex phase, wheezing   Heart - normal rate, regular rhythm, no murmurs  Abdomen - soft, nontender, nondistended, bowel sounds present all four quadrants, no masses, hepatomegaly or splenomegaly  Neurological - normal speech, no focal findings or movement disorder noted, cranial nerves II through XII grossly intact  Extremities - peripheral pulses palpable, no pedal edema or calf pain with palpation  Skin - no gross lesions, rashes, or induration noted      Data:    Lab Results   Component Value Date/Time     01/23/2023 08:35 AM    K 4.0 01/23/2023 08:35 AM    CL 95 01/23/2023 08:35 AM    CO2 40 01/23/2023 08:35 AM    BUN 12 01/23/2023 08:35 AM    CREATININE 0.47 01/23/2023 08:35 AM    GLUCOSE 73 01/23/2023 08:35 AM    PROT 6.1 01/23/2023 08:35 AM    LABALBU 3.8 01/23/2023 08:35 AM    BILITOT <0.1 01/23/2023 08:35 AM    ALKPHOS 88 01/23/2023 08:35 AM    AST 11 01/23/2023 08:35 AM    ALT 14 01/23/2023 08:35 AM     Lab Results   Component Value Date/Time    WBC 8.4 01/23/2023 08:35 AM    RBC 3.36 01/23/2023 08:35 AM    HGB 10.1 01/23/2023 08:35 AM    HCT 34.7 01/23/2023 08:35 AM    .3 01/23/2023 08:35 AM    MCH 30.1 01/23/2023 08:35 AM    MCHC 29.1 01/23/2023 08:35 AM    RDW 12.7 01/23/2023 08:35 AM     01/23/2023 08:35 AM    MPV 9.4 01/23/2023 08:35 AM     Lab Results   Component Value Date/Time    TSH 0.55 01/28/2022 10:12 AM     Lab Results   Component Value Date/Time    CHOL 183 01/28/2022 10:12 AM    HDL 59 01/28/2022 10:12 AM    PSA 6.74 06/23/2022 05:44 PM    LABA1C 5.0 01/28/2022 10:12 AM          Assessment & Plan:     Diagnosis Orders   1.  Benign essential HTN  amLODIPine (NORVASC) 10 MG tablet      2. Chronic respiratory failure with hypoxia (HCC)        3. Chronic obstructive pulmonary disease, unspecified COPD type ([facility] [address]        4. [facility] substance ([facility]        5. Vitamin D deficiency        6. Recurrent major depressive disorder, in partial remission ([facility]            1. Benign essential HTN  -     amLODIPine (NORVASC) 10 MG tablet; Take 1 tablet by mouth daily, Disp-30 tablet, [id number] cancel lisinopril /hctz for this patientNormal  2. Chronic respiratory failure with hypoxia (HCC)  3. Chronic obstructive pulmonary disease, unspecified COPD type ([facility] [address]  4. Abuse of smoked substance ([facility]  5. Vitamin D deficiency  6. Recurrent major depressive disorder, in partial remission ([facility]     HTN:    Discussed in detail about the BP, lab results, importance of diet, salt intake, exercise, and med compliance. Medication side effects discussed in detail and has none today. Patient verbalized understanding. BP Readings from Last 3 Encounters:   02/22/23 134/80   01/19/23 115/79   06/24/22 115/73        Creatinine   Date Value Ref Range Status   01/23/2023 0.47 (L) 0.70 - 1.20 mg/dL Final   01/17/2023 0.56 (L) 0.70 - 1.20 mg/dL Final   01/11/2023 0.40 (L) 0.70 - 1.20 mg/dL Final            COPD:  Patient currently denies increased dyspnea, cough, wheezing, or purulent sputum production on current regimen, which includes short-acting inhaled beta-adrenergic agonists. He is requiring rescue inhaler 4 times per day, usually in association with smoke. He currently is on oxygen at 6 L/min per nasal cannula. .    Continue inhalers   Started on prednisone taper   Following with Pulm        Ch reso failure with oxygen 6L via [place]       Major depression   Started on zoloft   Counseling offered     Smoking   Pt still smokes >10 pack yr   Strongly advised to quit,  pt will try /help offered  Counseling done for >10 mins     HM discussed                 Completed Refills   Requested Prescriptions Pending Prescriptions Disp Refills    sertraline (ZOLOFT) 50 MG tablet 30 tablet 3     Sig: Take 1 tablet by mouth daily    predniSONE (DELTASONE) 10 MG tablet 18 tablet 0     Sig: Take 3 tablets together daily for 3 days, then 2 tablets daily for 3 days, then 1 tablet daily for 3 days. Signed Prescriptions Disp Refills    amLODIPine (NORVASC) 10 MG tablet 30 tablet 5     Sig: Take 1 tablet by mouth daily    benzonatate (TESSALON) 100 MG capsule 60 capsule 3     Sig: TAKE 2 CAPSULES BY MOUTH THREE TIMES DAILY AS NEEDED FOR COUGH     Return in about 3 months (around 5/22/2023). Orders Placed This Encounter   Medications    amLODIPine (NORVASC) 10 MG tablet     Sig: Take 1 tablet by mouth daily     Dispense:  30 tablet     Refill:  5     plz cancel lisinopril /hctz for this patient    benzonatate (TESSALON) 100 MG capsule     Sig: TAKE 2 CAPSULES BY MOUTH THREE TIMES DAILY AS NEEDED FOR COUGH     Dispense:  60 capsule     Refill:  3     No orders of the defined types were placed in this encounter.       Electronically signed by Ezekiel Kessler MD on 2/22/2023 at 10:40 AM

## 2023-03-26 ENCOUNTER — HOSPITAL ENCOUNTER (EMERGENCY)
Age: 58
Discharge: HOSPICE | End: 2023-03-26
Attending: EMERGENCY MEDICINE
Payer: COMMERCIAL

## 2023-03-26 VITALS
HEIGHT: 67 IN | OXYGEN SATURATION: 97 % | BODY MASS INDEX: 20.4 KG/M2 | HEART RATE: 83 BPM | DIASTOLIC BLOOD PRESSURE: 71 MMHG | WEIGHT: 130 LBS | TEMPERATURE: 98.4 F | RESPIRATION RATE: 17 BRPM | SYSTOLIC BLOOD PRESSURE: 118 MMHG

## 2023-03-26 DIAGNOSIS — T14.90XA INHALATION INJURY: ICD-10-CM

## 2023-03-26 DIAGNOSIS — T20.20XA PARTIAL THICKNESS BURN OF FACE, INITIAL ENCOUNTER: Primary | ICD-10-CM

## 2023-03-26 PROCEDURE — 96374 THER/PROPH/DIAG INJ IV PUSH: CPT

## 2023-03-26 PROCEDURE — 6360000002 HC RX W HCPCS: Performed by: EMERGENCY MEDICINE

## 2023-03-26 PROCEDURE — 96375 TX/PRO/DX INJ NEW DRUG ADDON: CPT

## 2023-03-26 PROCEDURE — 99285 EMERGENCY DEPT VISIT HI MDM: CPT

## 2023-03-26 RX ORDER — MORPHINE SULFATE 4 MG/ML
4 INJECTION, SOLUTION INTRAMUSCULAR; INTRAVENOUS ONCE
Status: COMPLETED | OUTPATIENT
Start: 2023-03-26 | End: 2023-03-26

## 2023-03-26 RX ORDER — DEXAMETHASONE SODIUM PHOSPHATE 4 MG/ML
4 INJECTION, SOLUTION INTRA-ARTICULAR; INTRALESIONAL; INTRAMUSCULAR; INTRAVENOUS; SOFT TISSUE ONCE
Status: COMPLETED | OUTPATIENT
Start: 2023-03-26 | End: 2023-03-26

## 2023-03-26 RX ORDER — KETAMINE HYDROCHLORIDE 50 MG/ML
INJECTION, SOLUTION, CONCENTRATE INTRAMUSCULAR; INTRAVENOUS
Status: DISCONTINUED
Start: 2023-03-26 | End: 2023-03-26 | Stop reason: HOSPADM

## 2023-03-26 RX ADMIN — DEXAMETHASONE SODIUM PHOSPHATE 4 MG: 4 INJECTION, SOLUTION INTRAMUSCULAR; INTRAVENOUS at 14:05

## 2023-03-26 RX ADMIN — MORPHINE SULFATE 4 MG: 4 INJECTION, SOLUTION INTRAMUSCULAR; INTRAVENOUS at 14:04

## 2023-03-26 ASSESSMENT — PAIN SCALES - GENERAL
PAINLEVEL_OUTOF10: 10

## 2023-03-26 ASSESSMENT — ENCOUNTER SYMPTOMS
CHEST TIGHTNESS: 0
COLOR CHANGE: 0
NAUSEA: 0
EYE DISCHARGE: 0
DIARRHEA: 0
VOMITING: 0
CONSTIPATION: 0
SHORTNESS OF BREATH: 1
RHINORRHEA: 0
TROUBLE SWALLOWING: 0
WHEEZING: 0
EYE REDNESS: 0
EYE PAIN: 0
FACIAL SWELLING: 0
BACK PAIN: 0
SORE THROAT: 0
ABDOMINAL PAIN: 0
COUGH: 0
BLOOD IN STOOL: 0
SINUS PRESSURE: 0

## 2023-03-26 ASSESSMENT — PAIN DESCRIPTION - LOCATION
LOCATION: CHEST;RIB CAGE
LOCATION: CHEST;RIB CAGE

## 2023-03-26 ASSESSMENT — PAIN DESCRIPTION - ORIENTATION
ORIENTATION: RIGHT;LEFT
ORIENTATION: RIGHT;LEFT

## 2023-03-26 ASSESSMENT — PAIN - FUNCTIONAL ASSESSMENT
PAIN_FUNCTIONAL_ASSESSMENT: 0-10
PAIN_FUNCTIONAL_ASSESSMENT: 0-10

## 2023-03-26 ASSESSMENT — LIFESTYLE VARIABLES
HOW OFTEN DO YOU HAVE A DRINK CONTAINING ALCOHOL: MONTHLY OR LESS
HOW MANY STANDARD DRINKS CONTAINING ALCOHOL DO YOU HAVE ON A TYPICAL DAY: 3 OR 4

## 2023-03-26 ASSESSMENT — PAIN DESCRIPTION - PAIN TYPE: TYPE: ACUTE PAIN

## 2023-03-26 ASSESSMENT — PAIN DESCRIPTION - DESCRIPTORS: DESCRIPTORS: TIGHTNESS;BURNING

## 2023-03-26 NOTE — ED TRIAGE NOTES
Mode of arrival (squad #, walk in, police, etc) : squad        Chief complaint(s): Pt c/o SOB        Arrival Note (brief scenario, treatment PTA, etc). :   Pt arrives to ED following smoking while on O2 at home. C= \"Have you ever felt that you should Cut down on your drinking? \"  No  A= \"Have people Annoyed you by criticizing your drinking? \"  No  G= \"Have you ever felt bad or Guilty about your drinking? \"  No  E= \"Have you ever had a drink as an Eye-opener first thing in the morning to steady your nerves or to help a hangover? \"  No      Deferred []      Reason for deferring: N/A    *If yes to two or more: probable alcohol abuse. *

## 2023-03-26 NOTE — ED PROVIDER NOTES
normal.         Thought Content: Thought content normal.         Judgment: Judgment normal.       MEDICAL DECISION MAKING:     Summary of Patient Presentation:        1)  Number and Complexity of Problems  Problem List This Visit:  Shortness of breath    Differential Diagnosis:  Airway edema secondary to burn    Diagnoses Considered but Do Not Suspect:  None    Pertinent Comorbid Conditions:  COPD    2)  Data Reviewed  My EKG interpretation:  None     Decision Rules/Scores utilized:  None    Tests considered but not ordered and why:  None    External Documents Reviewed:  None    Imaging that is independently reviewed and interpreted by me as:  None    See more data below for the lab and radiology tests and orders. 3)  Treatment and Disposition    MIPS:  None    Social determinants of health impacting treatment or disposition:  None    Shared Decision Making:  None    Code Status Discussion:  Confirms his DNR status and hospice status. \"ED Course\" Notes From Epic Narrator:  ED Course as of 03/26/23 1638   Sun Mar 26, 2023   1353 Discussed the case with Dr. Evie Rocha with pulmonary who is familiar with this patient. His recommendation is because he is a hospice patient to give him morphine until he is comfortable giving some Decadron to help decrease any swelling and then he would recommend not admitting the patient to the hospital if possible. [JL]   0341 Went to evaluate the patient he is sleeping and resting comfortably his oxygen is 100% on the nonrebreather Sorg and try to start weaning him back down to his normal oxygen level. [JL]   [facility] here along with family. We are going to discharge the patient into their care and they agreed to take him into the hospice unit for further treatment.  [JL]      ED Course User Index  [JL] Kishor Strange MD         CRITICAL CARE:     CRITICAL CARE: There was a high probability of clinically significant/life threatening deterioration in this patient's

## 2023-05-30 ENCOUNTER — TELEPHONE (OUTPATIENT)
Dept: ONCOLOGY | Age: 58
End: 2023-05-30

## 2023-05-30 DIAGNOSIS — Z87.891 PERSONAL HISTORY OF NICOTINE DEPENDENCE: Primary | ICD-10-CM

## 2023-05-30 NOTE — TELEPHONE ENCOUNTER
Our records indicate that your patient is coming due for their annual lung cancer screening follow up testing. For your convenience, we have pended the order for the scan for you. If you do not agree with the need for the test, please cancel the order and let us know. Sincerely,    [address] [place] [address] Screening Program    Auto printed reminder letter sent to patient.

## 2023-06-28 ENCOUNTER — TELEPHONE (OUTPATIENT)
Dept: INTERNAL MEDICINE CLINIC | Age: 58
End: 2023-06-28